# Patient Record
Sex: FEMALE | Race: WHITE | NOT HISPANIC OR LATINO | Employment: FULL TIME | URBAN - NONMETROPOLITAN AREA
[De-identification: names, ages, dates, MRNs, and addresses within clinical notes are randomized per-mention and may not be internally consistent; named-entity substitution may affect disease eponyms.]

---

## 2017-01-09 ENCOUNTER — OFFICE VISIT (OUTPATIENT)
Dept: FAMILY MEDICINE CLINIC | Facility: CLINIC | Age: 22
End: 2017-01-09

## 2017-01-09 VITALS
WEIGHT: 263 LBS | BODY MASS INDEX: 48.1 KG/M2 | HEART RATE: 74 BPM | OXYGEN SATURATION: 97 % | DIASTOLIC BLOOD PRESSURE: 70 MMHG | SYSTOLIC BLOOD PRESSURE: 108 MMHG

## 2017-01-09 DIAGNOSIS — K21.9 GASTROESOPHAGEAL REFLUX DISEASE WITHOUT ESOPHAGITIS: ICD-10-CM

## 2017-01-09 DIAGNOSIS — Z86.69 HX OF MIGRAINES: ICD-10-CM

## 2017-01-09 DIAGNOSIS — F32.89 OTHER DEPRESSION: ICD-10-CM

## 2017-01-09 DIAGNOSIS — IMO0001: ICD-10-CM

## 2017-01-09 PROCEDURE — 99214 OFFICE O/P EST MOD 30 MIN: CPT | Performed by: NURSE PRACTITIONER

## 2017-01-09 RX ORDER — TOPIRAMATE 25 MG/1
25 TABLET ORAL DAILY
Qty: 30 TABLET | Refills: 0 | Status: SHIPPED | OUTPATIENT
Start: 2017-01-09 | End: 2017-02-08

## 2017-01-09 RX ORDER — SERTRALINE HYDROCHLORIDE 25 MG/1
25 TABLET, FILM COATED ORAL DAILY
Qty: 30 TABLET | Refills: 1 | Status: SHIPPED | OUTPATIENT
Start: 2017-01-09 | End: 2017-02-08

## 2017-01-09 NOTE — PROGRESS NOTES
Subjective   Courtney Saleh is a 21 y.o. female.     HPI Comments: Patient is here for follow up on her nausea, bloating GERD and her pregnancy. She states that it was discovered that she had an ectopic pregnancy on 1/16, and she started Methotrexate injections. She states we went to the ED at [facility] on 1/17, because she did not feel well and was having a lot abdominal pain. She states at the ED she started feeling worse after a TV US. She states the US only shower fluid and  her blood work was ok and they thought she was just going to get to go home. She states when she went to get up to go home though, she almost passed out and her blood pressure was very low. She states they then called in the on call OB and they did emergency surgery on her and found her fallopian tube has ruptured. She states they told her the Methotrexate injections had cause the blood to clot and that made the tube rupture. She states she follow up with them on Friday and they told her the surgery sights were fine but her only option for pregnancy now is IVF insemination. She states she is going to wait for a while and then try this option. She states all of her nausea and bloating has resolved and she is taking her Prevacid, which controls the GERD.     Patient is also her for follow up on her depression. She states that she used to take Zoloft in the past for depression, but has not taken anything for a couple years. She states that she feels she needs it again after this ectopic pregnancy. She states she feels sad, a lot, does not want to go anywhere, no motivation, trouble sleeping and feeling tired.     Patient states she also wants to go back on her Topamax for her migraines and she still has her prescription at home that she was taking.        The following portions of the patient's history were reviewed and updated as appropriate: allergies, current medications, past family history, past medical history, past social history, past surgical  history and problem list.    Review of Systems   Constitutional: Negative.    HENT: Negative.    Eyes: Negative.    Respiratory: Negative.    Cardiovascular: Negative.    Gastrointestinal: Negative.    Genitourinary: Negative.    Musculoskeletal: Negative.    Skin: Negative.    Neurological: Negative for dizziness, syncope, weakness and numbness.   Hematological: Negative for adenopathy.   Psychiatric/Behavioral: Positive for dysphoric mood and sleep disturbance. Negative for confusion, self-injury and suicidal ideas. The patient is not nervous/anxious.        Objective   Physical Exam   Constitutional: She is oriented to person, place, and time. She appears well-developed and well-nourished. No distress.   HENT:   Head: Normocephalic.   Right Ear: External ear normal.   Left Ear: External ear normal.   Nose: Nose normal.   Mouth/Throat: No oropharyngeal exudate.   Eyes: Conjunctivae are normal.   Cardiovascular: Normal rate, regular rhythm and normal heart sounds.    No murmur heard.  Pulmonary/Chest: Effort normal and breath sounds normal. No respiratory distress. She has no wheezes. She has no rales. She exhibits tenderness.   Abdominal: Soft. Bowel sounds are normal. She exhibits no distension and no mass. There is no hepatosplenomegaly or splenomegaly. There is no tenderness. There is no rebound and no guarding. No hernia.   Lymphadenopathy:        Right cervical: No superficial cervical, no deep cervical and no posterior cervical adenopathy present.       Left cervical: No superficial cervical, no deep cervical and no posterior cervical adenopathy present.   Neurological: She is alert and oriented to person, place, and time. Coordination and gait normal.   Skin: Skin is warm and dry. No rash noted.   Psychiatric: She has a normal mood and affect. Her behavior is normal. Judgment and thought content normal.   Nursing note and vitals reviewed.      Assessment/Plan   Courtney was seen today for  follow-up.    Diagnoses and all orders for this visit:    Aborted ectopic pregnancy    Gastroesophageal reflux disease without esophagitis    Other depression  -     topiramate (TOPAMAX) 25 MG tablet; Take 1 tablet by mouth Daily for 30 days.  -     sertraline (ZOLOFT) 25 MG tablet; Take 1 tablet by mouth Daily for 30 days.    Hx of migraines      Patient to follow up with GYN as directed, for all of her GYN concerns.    Patient to continue Prevacid as directed for her GERD, since it is working well.    Zoloft started today for her depression.    Patient to resume her Topamax for her migraines.     Patient was encouraged to keep me informed of any acute changes, lack of improvement, or any new concerning symptoms. Patient voiced understanding of all instructions and denied further questions.    Patient to RTC in one month.

## 2017-01-09 NOTE — MR AVS SNAPSHOT
Courtney Delfino   1/9/2017 1:15 PM   Office Visit    Dept Phone:  [phone]   Encounter #:  [id number]    Provider:  RUTHIE Lew   Department:  [facility] PRIMARY CARE                Your Full Care Plan              Today's Medication Changes          These changes are accurate as of: 1/9/17  2:04 PM.  If you have any questions, ask your nurse or doctor.               New Medication(s)Ordered:     sertraline 25 MG tablet   Commonly known as:  ZOLOFT   Take 1 tablet by mouth Daily for 30 days.         Medication(s)that have changed:     topiramate 25 MG tablet   Commonly known as:  TOPAMAX   Take 1 tablet by mouth Daily for 30 days.   What changed:    - how much to take  - how to take this  - when to take this            Where to Get Your Medications      These medications were sent to [facility] [address] [place] [address] - [address] [place] [address] - [phone]  - [phone] [address] [address]     Phone:  [phone]     sertraline 25 MG tablet         You can get these medications from any pharmacy     Bring a paper prescription for each of these medications     topiramate 25 MG tablet                  Your Updated Medication List          This list is accurate as of: 1/9/17  2:04 PM.  Always use your most recent med list.                lansoprazole 15 MG capsule   Commonly known as:  PREVACID   Take 1 capsule by mouth 2 (two) times a day.       sertraline 25 MG tablet   Commonly known as:  ZOLOFT   Take 1 tablet by mouth Daily for 30 days.       topiramate 25 MG tablet   Commonly known as:  TOPAMAX   Take 1 tablet by mouth Daily for 30 days.               You Were Diagnosed With        Codes Comments    Aborted ectopic pregnancy     ICD-10-CM: O00.90  ICD-9-CM: 633.90     Gastroesophageal reflux disease without esophagitis     ICD-10-CM: K21.9  ICD-9-CM: 530.81     Other depression     ICD-10-CM: F32.89     Hx of migraines      ICD-10-CM: Z86.69  ICD-9-CM: V12.49       Instructions     None    Patient Instructions History      Upcoming Appointments     Visit Type Date Time Department    FOLLOW UP 1/9/2017  1:15 PM [facility]      MyChart Signup     Our records indicate that you have an active [employer] account.    You can view your After Visit Summary by going to [employer] and logging in with your [employer] username and password.  If you don't have a [employer] username and password but a parent or guardian has access to your record, the parent or guardian should login with their own [employer] username and password and access your record to view the After Visit Summary.    If you have questions, you can email [email or web address] or call [phone] to talk to our [employer] staff.  Remember, [employer] is NOT to be used for urgent needs.  For medical emergencies, dial 911.               Other Info from Your Visit           Allergies     No Known Allergies      Reason for Visit     Follow-up Pt here for f/u on nausea, bloating and ectopic pregnancy      Vital Signs     Blood Pressure Pulse Weight Oxygen Saturation Body Mass Index Smoking Status    108/70 (BP Location: Left arm, Patient Position: Sitting) 74 263 lb (119 kg) 97% 48.1 kg/m2 Former Smoker      Problems and Diagnoses Noted     Depression    Acid reflux disease    Hx of migraines    Aborted ectopic pregnancy

## 2017-01-25 ENCOUNTER — OFFICE VISIT (OUTPATIENT)
Dept: FAMILY MEDICINE CLINIC | Facility: CLINIC | Age: 22
End: 2017-01-25

## 2017-01-25 VITALS
HEIGHT: 62 IN | WEIGHT: 259 LBS | BODY MASS INDEX: 47.66 KG/M2 | TEMPERATURE: 99 F | DIASTOLIC BLOOD PRESSURE: 78 MMHG | HEART RATE: 72 BPM | OXYGEN SATURATION: 99 % | SYSTOLIC BLOOD PRESSURE: 124 MMHG

## 2017-01-25 DIAGNOSIS — R05.9 COUGH: Primary | ICD-10-CM

## 2017-01-25 DIAGNOSIS — R53.81 MALAISE: ICD-10-CM

## 2017-01-25 DIAGNOSIS — J06.9 ACUTE URI: ICD-10-CM

## 2017-01-25 LAB
EXPIRATION DATE: NORMAL
FLUAV AG NPH QL: NORMAL
FLUBV AG NPH QL: NORMAL
INTERNAL CONTROL: NORMAL
Lab: NORMAL

## 2017-01-25 PROCEDURE — 87804 INFLUENZA ASSAY W/OPTIC: CPT | Performed by: FAMILY MEDICINE

## 2017-01-25 PROCEDURE — 99213 OFFICE O/P EST LOW 20 MIN: CPT | Performed by: FAMILY MEDICINE

## 2017-01-25 RX ORDER — PREDNISONE 20 MG/1
TABLET ORAL
Qty: 6 TABLET | Refills: 0 | Status: SHIPPED | OUTPATIENT
Start: 2017-01-25 | End: 2017-03-09

## 2017-01-25 RX ORDER — DEXTROMETHORPHAN HYDROBROMIDE AND PROMETHAZINE HYDROCHLORIDE 15; 6.25 MG/5ML; MG/5ML
5 SYRUP ORAL 4 TIMES DAILY PRN
Qty: 118 ML | Refills: 0 | Status: SHIPPED | OUTPATIENT
Start: 2017-01-25 | End: 2017-03-09

## 2017-01-25 NOTE — MR AVS SNAPSHOT
Courtney Delfino   1/25/2017 4:30 PM   Office Visit    Dept Phone:  [phone]   Encounter #:  [id number]    Provider:  Elinor Moya MD   Department:  [facility] PRIMARY CARE                Your Full Care Plan              Today's Medication Changes          These changes are accurate as of: 1/25/17  4:54 PM.  If you have any questions, ask your nurse or doctor.               New Medication(s)Ordered:     predniSONE 20 MG tablet   Commonly known as:  DELTASONE   1 po bid x 3 days       promethazine-dextromethorphan 6.25-15 MG/5ML syrup   Commonly known as:  PROMETHAZINE-DM   Take 5 mL by mouth 4 (Four) Times a Day As Needed for cough.            Where to Get Your Medications      These medications were sent to [facility] [address] [place] [address] - [address] [place] [address] - [phone]  - [phone] [address] [address]     Phone:  [phone]     predniSONE 20 MG tablet    promethazine-dextromethorphan 6.25-15 MG/5ML syrup                  Your Updated Medication List          This list is accurate as of: 1/25/17  4:54 PM.  Always use your most recent med list.                lansoprazole 15 MG capsule   Commonly known as:  PREVACID   Take 1 capsule by mouth 2 (two) times a day.       predniSONE 20 MG tablet   Commonly known as:  DELTASONE   1 po bid x 3 days       promethazine-dextromethorphan 6.25-15 MG/5ML syrup   Commonly known as:  PROMETHAZINE-DM   Take 5 mL by mouth 4 (Four) Times a Day As Needed for cough.       sertraline 25 MG tablet   Commonly known as:  ZOLOFT   Take 1 tablet by mouth Daily for 30 days.       topiramate 25 MG tablet   Commonly known as:  TOPAMAX   Take 1 tablet by mouth Daily for 30 days.               You Were Diagnosed With        Codes Comments    Cough    -  Primary ICD-10-CM: R05  ICD-9-CM: 786.2     Malaise     ICD-10-CM: R53.81  ICD-9-CM: 780.79     Acute URI     ICD-10-CM: J06.9  ICD-9-CM: 465.9        "  Instructions     None    Patient Instructions History      Upcoming Appointments     Visit Type Date Time Department    SAME DAY 1/25/2017  4:30 PM [facility]    FOLLOW UP 2/9/2017  3:30 PM [facility]      MyChart Signup     Our records indicate that you have an active [facility] [employer] account.    You can view your After Visit Summary by going to [employer] and logging in with your [employer] username and password.  If you don't have a [employer] username and password but a parent or guardian has access to your record, the parent or guardian should login with their own [employer] username and password and access your record to view the After Visit Summary.    If you have questions, you can email [email or web address] or call [phone] to talk to our [employer] staff.  Remember, [employer] is NOT to be used for urgent needs.  For medical emergencies, dial 911.               Other Info from Your Visit           Your Appointments     Feb 09, 2017  3:30 PM EST   Follow Up with RUTHIE Lew   [facility] PRIMARY [facility] (--)    [address]  [place] [address] [phone]           Arrive 15 minutes prior to appointment.              Allergies     No Known Allergies      Reason for Visit     Cough x 2 days      Vital Signs     Blood Pressure Pulse Temperature Height Weight Oxygen Saturation    124/78 (BP Location: Right arm, Patient Position: Sitting) 72 99 °F (37.2 °C) 62\" (157.5 cm) 259 lb (117 kg) 99%    Body Mass Index Smoking Status                47.37 kg/m2 Former Smoker          Problems and Diagnoses Noted     Cough    -  Primary    Malaise        Acute upper respiratory infection            "

## 2017-01-25 NOTE — PROGRESS NOTES
Subjective   Courtney Saleh is a 21 y.o. female.     Cough   This is a new problem. The current episode started in the past 7 days (2-3 days). The problem has been gradually worsening. The problem occurs every few minutes. The cough is non-productive. Associated symptoms include chest pain (with cough), a fever (subjective), headaches, myalgias, nasal congestion and shortness of breath. Pertinent negatives include no ear pain, eye redness, hemoptysis, rash, rhinorrhea, sore throat or wheezing. The symptoms are aggravated by lying down and cold air. She has tried nothing for the symptoms.      Review of Systems   Constitutional: Positive for fever (subjective).   HENT: Negative for ear pain, rhinorrhea and sore throat.    Eyes: Negative for pain, discharge and redness.   Respiratory: Positive for cough and shortness of breath. Negative for hemoptysis and wheezing.    Cardiovascular: Positive for chest pain (with cough).   Gastrointestinal: Positive for nausea. Negative for abdominal pain, diarrhea and vomiting.   Genitourinary: Negative for dysuria, frequency, hematuria and urgency.   Musculoskeletal: Positive for arthralgias and myalgias.   Skin: Negative for rash.   Neurological: Positive for weakness and headaches. Negative for syncope.   Psychiatric/Behavioral: Positive for sleep disturbance.     Objective    Vitals:    01/25/17 1629   BP: 124/78   Pulse: 72   Temp: 99 °F (37.2 °C)   SpO2: 99%     Body mass index is 47.37 kg/(m^2).  Last 2 weights    01/25/17  1629   Weight: 259 lb (117 kg)     Physical Exam   Constitutional: She is oriented to person, place, and time. She appears well-developed and well-nourished. She is cooperative. She appears ill (mildly). No distress.   Morbidly obese   HENT:   Head: Normocephalic and atraumatic.   Right Ear: Tympanic membrane, external ear and ear canal normal.   Left Ear: Tympanic membrane, external ear and ear canal normal.   Nose: Mucosal edema present. No rhinorrhea.    Mouth/Throat: Uvula is midline, oropharynx is clear and moist and mucous membranes are normal. Mucous membranes are not dry. No posterior oropharyngeal erythema.   Eyes: Conjunctivae and lids are normal.   Neck: Phonation normal.   Cardiovascular: Normal rate and regular rhythm.    Pulmonary/Chest: Effort normal. No respiratory distress. She has decreased breath sounds. She has no wheezes. She has no rhonchi. She has no rales.   Lymphadenopathy:     She has no cervical adenopathy.   Neurological: She is alert and oriented to person, place, and time. Gait normal.   Skin: Skin is warm and dry. No rash noted.   Psychiatric: She has a normal mood and affect. Her behavior is normal.   Nursing note and vitals reviewed.    Recent Results (from the past 24 hour(s))   POC Influenza A / B    Collection Time: 01/25/17  4:58 PM   Result Value Ref Range    Rapid Influenza A Ag neg     Rapid Influenza B Ag neg     Internal Control Passed Passed    Lot Number [id number]     Expiration Date 08/01/2018      Assessment/Plan   Courtney was seen today for cough.    Diagnoses and all orders for this visit:    Cough  -     promethazine-dextromethorphan (PROMETHAZINE-DM) 6.25-15 MG/5ML syrup; Take 5 mL by mouth 4 (Four) Times a Day As Needed for cough.  -     predniSONE (DELTASONE) 20 MG tablet; 1 po bid x 3 days  -     POC Influenza A / B    Malaise  -     POC Influenza A / B    Acute URI  -     promethazine-dextromethorphan (PROMETHAZINE-DM) 6.25-15 MG/5ML syrup; Take 5 mL by mouth 4 (Four) Times a Day As Needed for cough.  -     predniSONE (DELTASONE) 20 MG tablet; 1 po bid x 3 days  -     POC Influenza A / B    Symptomatic tx of URI reviewed.  If minimal improvement in next few days, consider abx tx.  Patient was encouraged to keep me informed of any acute changes, lack of improvement, or any new concerning symptoms.  Patient voiced understanding of all instructions and denied further questions.

## 2017-03-09 ENCOUNTER — OFFICE VISIT (OUTPATIENT)
Dept: FAMILY MEDICINE CLINIC | Facility: CLINIC | Age: 22
End: 2017-03-09

## 2017-03-09 VITALS
HEIGHT: 62 IN | DIASTOLIC BLOOD PRESSURE: 84 MMHG | HEART RATE: 78 BPM | SYSTOLIC BLOOD PRESSURE: 118 MMHG | OXYGEN SATURATION: 98 % | WEIGHT: 262 LBS | BODY MASS INDEX: 48.21 KG/M2

## 2017-03-09 DIAGNOSIS — E78.2 MIXED HYPERLIPIDEMIA: ICD-10-CM

## 2017-03-09 DIAGNOSIS — K21.9 GASTROESOPHAGEAL REFLUX DISEASE WITHOUT ESOPHAGITIS: ICD-10-CM

## 2017-03-09 DIAGNOSIS — E66.01 OBESITY, CLASS III, BMI 40-49.9 (MORBID OBESITY) (HCC): ICD-10-CM

## 2017-03-09 DIAGNOSIS — Z86.69 HX OF MIGRAINES: ICD-10-CM

## 2017-03-09 DIAGNOSIS — R79.89 ABNORMAL SERUM THYROXINE (T4) LEVEL: ICD-10-CM

## 2017-03-09 DIAGNOSIS — F31.9 BIPOLAR 1 DISORDER, DEPRESSED (HCC): ICD-10-CM

## 2017-03-09 PROCEDURE — 99214 OFFICE O/P EST MOD 30 MIN: CPT | Performed by: NURSE PRACTITIONER

## 2017-03-09 RX ORDER — LANSOPRAZOLE 15 MG/1
15 CAPSULE, DELAYED RELEASE ORAL 2 TIMES DAILY
Qty: 60 CAPSULE | Refills: 5 | Status: SHIPPED | OUTPATIENT
Start: 2017-03-09 | End: 2017-12-14 | Stop reason: DRUGHIGH

## 2017-03-09 RX ORDER — TOPIRAMATE 25 MG/1
25 TABLET ORAL 2 TIMES DAILY
COMMUNITY
End: 2017-03-09 | Stop reason: SDUPTHER

## 2017-03-09 RX ORDER — PHENTERMINE HYDROCHLORIDE 37.5 MG/1
37.5 CAPSULE ORAL EVERY MORNING
Qty: 30 CAPSULE | Refills: 0 | Status: SHIPPED | OUTPATIENT
Start: 2017-03-09 | End: 2017-04-08

## 2017-03-09 RX ORDER — TOPIRAMATE 25 MG/1
25 TABLET ORAL 2 TIMES DAILY
Qty: 60 TABLET | Refills: 5 | Status: SHIPPED | OUTPATIENT
Start: 2017-03-09 | End: 2017-04-08

## 2017-03-13 ENCOUNTER — LAB (OUTPATIENT)
Dept: FAMILY MEDICINE CLINIC | Facility: CLINIC | Age: 22
End: 2017-03-13

## 2017-03-13 DIAGNOSIS — Z23 NEED FOR INFLUENZA VACCINATION: ICD-10-CM

## 2017-03-13 PROCEDURE — 90471 IMMUNIZATION ADMIN: CPT | Performed by: NURSE PRACTITIONER

## 2017-03-13 PROCEDURE — 90686 IIV4 VACC NO PRSV 0.5 ML IM: CPT | Performed by: NURSE PRACTITIONER

## 2017-03-14 ENCOUNTER — TELEPHONE (OUTPATIENT)
Dept: FAMILY MEDICINE CLINIC | Facility: CLINIC | Age: 22
End: 2017-03-14

## 2017-03-14 ENCOUNTER — RESULTS ENCOUNTER (OUTPATIENT)
Dept: FAMILY MEDICINE CLINIC | Facility: CLINIC | Age: 22
End: 2017-03-14

## 2017-03-14 DIAGNOSIS — Z86.69 HX OF MIGRAINES: ICD-10-CM

## 2017-03-14 DIAGNOSIS — E66.01 OBESITY, CLASS III, BMI 40-49.9 (MORBID OBESITY) (HCC): ICD-10-CM

## 2017-03-14 DIAGNOSIS — R79.89 ABNORMAL SERUM THYROXINE (T4) LEVEL: ICD-10-CM

## 2017-03-14 DIAGNOSIS — E78.2 MIXED HYPERLIPIDEMIA: ICD-10-CM

## 2017-03-14 DIAGNOSIS — F31.9 BIPOLAR 1 DISORDER, DEPRESSED (HCC): ICD-10-CM

## 2017-03-14 LAB
ALBUMIN SERPL-MCNC: 4.2 G/DL (ref 3.5–5)
ALBUMIN/GLOB SERPL: 1.5 G/DL (ref 1–2)
ALP SERPL-CCNC: 87 U/L (ref 38–126)
ALT SERPL-CCNC: 27 U/L (ref 13–69)
AST SERPL-CCNC: 26 U/L (ref 15–46)
BILIRUB SERPL-MCNC: 0.9 MG/DL (ref 0.2–1.3)
BUN SERPL-MCNC: 13 MG/DL (ref 7–20)
BUN/CREAT SERPL: 16.3 (ref 7.1–23.5)
CALCIUM SERPL-MCNC: 9.5 MG/DL (ref 8.4–10.2)
CHLORIDE SERPL-SCNC: 106 MMOL/L (ref 98–107)
CHOLEST SERPL-MCNC: 203 MG/DL (ref 0–199)
CO2 SERPL-SCNC: 26 MMOL/L (ref 26–30)
CREAT SERPL-MCNC: 0.8 MG/DL (ref 0.6–1.3)
GLOBULIN SER CALC-MCNC: 2.8 GM/DL
GLUCOSE SERPL-MCNC: 87 MG/DL (ref 74–98)
HDLC SERPL-MCNC: 56 MG/DL (ref 40–60)
LDLC SERPL CALC-MCNC: 127 MG/DL (ref 0–99)
POTASSIUM SERPL-SCNC: 4.4 MMOL/L (ref 3.5–5.1)
PROT SERPL-MCNC: 7 G/DL (ref 6.3–8.2)
SODIUM SERPL-SCNC: 140 MMOL/L (ref 137–145)
T3FREE SERPL-MCNC: 3.1 PG/ML (ref 2–4.4)
T4 FREE SERPL-MCNC: 0.88 NG/DL (ref 0.78–2.19)
TRIGL SERPL-MCNC: 100 MG/DL
TSH SERPL DL<=0.005 MIU/L-ACNC: 3.11 MIU/ML (ref 0.47–4.68)
VLDLC SERPL CALC-MCNC: 20 MG/DL

## 2017-03-14 NOTE — TELEPHONE ENCOUNTER
----- Message from Ember Caballero MA sent at 3/14/2017  6:46 PM EDT -----  Pt notified of results    ----- Message -----     From: RUTHIE Lew     Sent: 3/14/2017   5:28 PM       To: Ember Caballero MA    Her labs all look good!!

## 2017-04-10 ENCOUNTER — OFFICE VISIT (OUTPATIENT)
Dept: FAMILY MEDICINE CLINIC | Facility: CLINIC | Age: 22
End: 2017-04-10

## 2017-04-10 VITALS
SYSTOLIC BLOOD PRESSURE: 118 MMHG | OXYGEN SATURATION: 99 % | BODY MASS INDEX: 45.45 KG/M2 | WEIGHT: 247 LBS | HEART RATE: 74 BPM | DIASTOLIC BLOOD PRESSURE: 90 MMHG | HEIGHT: 62 IN

## 2017-04-10 DIAGNOSIS — F31.9 BIPOLAR 1 DISORDER, DEPRESSED (HCC): ICD-10-CM

## 2017-04-10 DIAGNOSIS — E66.01 OBESITY, CLASS III, BMI 40-49.9 (MORBID OBESITY) (HCC): ICD-10-CM

## 2017-04-10 PROCEDURE — 99214 OFFICE O/P EST MOD 30 MIN: CPT | Performed by: NURSE PRACTITIONER

## 2017-04-10 RX ORDER — PHENTERMINE HYDROCHLORIDE 37.5 MG/1
37.5 CAPSULE ORAL EVERY MORNING
COMMUNITY
End: 2017-04-10 | Stop reason: SDUPTHER

## 2017-04-10 RX ORDER — TOPIRAMATE 25 MG/1
25 TABLET ORAL 2 TIMES DAILY
COMMUNITY
End: 2017-06-09

## 2017-04-10 RX ORDER — PHENTERMINE HYDROCHLORIDE 37.5 MG/1
37.5 CAPSULE ORAL EVERY MORNING
Qty: 30 CAPSULE | Refills: 1 | Status: SHIPPED | OUTPATIENT
Start: 2017-04-10 | End: 2017-07-10 | Stop reason: SDUPTHER

## 2017-04-10 NOTE — PROGRESS NOTES
Subjective   Courtney Saleh is a 21 y.o. female.     HPI Comments: Patient is here today for follow up on her obesity. She states she has lost 15 pounds in a month, since she started Adipex. She states it curbs her appetite and also gives her more energy, so she has been going to the gym,every day. She states she is eating some healthier, but not a lot, but she is eating a lot less. She states it also helps her depression because it gives her energy.        The following portions of the patient's history were reviewed and updated as appropriate: allergies, current medications, past family history, past medical history, past social history, past surgical history and problem list.    Review of Systems   Constitutional: Negative.    HENT: Negative.    Eyes: Negative.    Respiratory: Negative.    Cardiovascular: Negative.    Gastrointestinal: Negative.    Genitourinary: Negative.    Musculoskeletal: Negative.    Skin: Negative.    Neurological: Negative for dizziness, syncope, weakness and numbness.   Hematological: Negative for adenopathy.   Psychiatric/Behavioral: Negative for confusion and suicidal ideas. The patient is not nervous/anxious.        Objective   Physical Exam   Constitutional: She is oriented to person, place, and time. She appears well-developed and well-nourished.   HENT:   Head: Normocephalic.   Right Ear: External ear normal.   Left Ear: External ear normal.   Nose: Nose normal.   Eyes: Conjunctivae are normal.   Neck: Normal range of motion. Neck supple.   Cardiovascular: Normal rate, regular rhythm and normal heart sounds.    Pulmonary/Chest: Effort normal and breath sounds normal. No respiratory distress.   Abdominal: Soft. She exhibits no distension. There is no tenderness.   Neurological: She is alert and oriented to person, place, and time.   Skin: Skin is warm and dry. No rash noted.   Psychiatric: She has a normal mood and affect. Her behavior is normal. Judgment and thought content normal.    Nursing note and vitals reviewed.      Assessment/Plan   Courtney was seen today for follow-up.    Diagnoses and all orders for this visit:    Obesity, Class III, BMI 40-49.9 (morbid obesity)    Bipolar 1 disorder, depressed    Patient congratulated on her 15 pound weight loss, and encouraged to continue her life style modifications. Adipex continued at this time, since it working well and she is tolerating it well. It is also helping with her depression, associated with her Bipolar disorder.     Patient was encouraged to keep me informed of any acute changes, lack of improvement, or any new concerning symptoms. Patient voiced understanding of all instructions and denied further questions.    Patient to RTC in 2 months for follow up.

## 2017-06-09 ENCOUNTER — OFFICE VISIT (OUTPATIENT)
Dept: FAMILY MEDICINE CLINIC | Facility: CLINIC | Age: 22
End: 2017-06-09

## 2017-06-09 VITALS
HEIGHT: 62 IN | SYSTOLIC BLOOD PRESSURE: 118 MMHG | DIASTOLIC BLOOD PRESSURE: 84 MMHG | WEIGHT: 244 LBS | HEART RATE: 90 BPM | OXYGEN SATURATION: 97 % | BODY MASS INDEX: 44.9 KG/M2

## 2017-06-09 DIAGNOSIS — E66.01 OBESITY, CLASS III, BMI 40-49.9 (MORBID OBESITY) (HCC): ICD-10-CM

## 2017-06-09 DIAGNOSIS — Z86.69 HX OF MIGRAINES: ICD-10-CM

## 2017-06-09 DIAGNOSIS — F39 MOOD DISORDER (HCC): ICD-10-CM

## 2017-06-09 PROCEDURE — 99214 OFFICE O/P EST MOD 30 MIN: CPT | Performed by: NURSE PRACTITIONER

## 2017-06-09 RX ORDER — OLANZAPINE 10 MG/1
10 TABLET ORAL NIGHTLY
Qty: 30 TABLET | Refills: 1 | Status: SHIPPED | OUTPATIENT
Start: 2017-06-09 | End: 2017-07-10

## 2017-06-09 RX ORDER — AMITRIPTYLINE HYDROCHLORIDE 10 MG/1
10 TABLET, FILM COATED ORAL NIGHTLY
Qty: 30 TABLET | Refills: 2 | Status: SHIPPED | OUTPATIENT
Start: 2017-06-09 | End: 2017-07-10

## 2017-06-09 NOTE — PROGRESS NOTES
Subjective   Courtney Saleh is a 21 y.o. female.     HPI Comments: Patient is here today for follow up on her obesity. She states the Adipex has worked well for her, she has lost 20 pounds, and has not even taken it for the past month, because she could not afford it. She state she has one month left, so she is going to finish it. She states she is also still walking several times a week and eating healthier, but not as good as she was.     Patient is also here for follow up on her migraines. She states the Topamax was working well but she but her mother was on it and caused her to get a kidney stone, so she stopped it. She states she still needs something for migraines though because she has one nearly every day.       The following portions of the patient's history were reviewed and updated as appropriate: allergies, current medications, past family history, past medical history, past social history, past surgical history and problem list.    Review of Systems   Constitutional: Positive for fatigue. Negative for activity change, appetite change, chills, diaphoresis, fever and unexpected weight change.   Eyes: Negative.    Respiratory: Negative.    Cardiovascular: Negative.    Gastrointestinal: Negative.    Genitourinary: Negative.    Musculoskeletal: Negative.    Skin: Negative.    Neurological: Positive for headaches. Negative for dizziness, syncope, weakness and numbness.   Hematological: Negative for adenopathy.   Psychiatric/Behavioral: Negative for confusion, dysphoric mood, self-injury, sleep disturbance and suicidal ideas. The patient is not nervous/anxious.         Patient states she has been very alvarez lately, and feeling really fatigued, and she thinks it is because she is needing medication again for her Bipolar/Mood disorder       Objective   Physical Exam   Constitutional: She is oriented to person, place, and time. She appears well-developed and well-nourished. No distress.   HENT:   Head:  Normocephalic.   Right Ear: External ear normal.   Left Ear: External ear normal.   Nose: Nose normal.   Eyes: Conjunctivae are normal.   Neck: Normal range of motion. Neck supple. No tracheal deviation present. No thyromegaly present.   Cardiovascular: Normal rate, regular rhythm, normal heart sounds and intact distal pulses.    No murmur heard.  Pulmonary/Chest: Effort normal and breath sounds normal. No respiratory distress. She has no wheezes. She has no rales. She exhibits no tenderness.   Abdominal: Soft. Bowel sounds are normal. She exhibits no distension and no mass. There is no hepatosplenomegaly or splenomegaly. There is no tenderness. There is no rebound and no guarding. No hernia.   Musculoskeletal: Normal range of motion. She exhibits no edema or tenderness.   NROM all major joints   Lymphadenopathy:     She has no cervical adenopathy.        Right cervical: No superficial cervical, no deep cervical and no posterior cervical adenopathy present.       Left cervical: No superficial cervical, no deep cervical and no posterior cervical adenopathy present.   Neurological: She is alert and oriented to person, place, and time. Coordination and gait normal.   Skin: Skin is warm and dry. No rash noted.   Psychiatric: She has a normal mood and affect. Her behavior is normal. Judgment and thought content normal.       Assessment/Plan   Courtney was seen today for follow-up.    Diagnoses and all orders for this visit:    Obesity, Class III, BMI 40-49.9 (morbid obesity)    Hx of migraines  -     amitriptyline (ELAVIL) 10 MG tablet; Take 1 tablet by mouth Every Night for 30 days.    Mood disorder  -     OLANZapine (zyPREXA) 10 MG tablet; Take 1 tablet by mouth Every Night for 30 days.      Patient to continue her last month of Adipex, for her weight loss. Nutrition and activity goals reviewed including: mainly water to drink, limit white flour/processed sugar, high protein, high fiber carbs, good breakfast, working  toward 150 mins cardio per week, weight training 2x/week,    Elavil started today, for her migraines, since she no longer wants to take Topamax.  This will help her sleeping and depression as well.    Zyprexa started today for treatment of her mood disorder.     Patient was encouraged to keep me informed of any acute changes, lack of improvement, or any new concerning symptoms.Patient voiced understanding of all instructions and denied further questions.    Patient to RTC in 4 weeks for follow up and sooner if needed.

## 2017-07-10 ENCOUNTER — OFFICE VISIT (OUTPATIENT)
Dept: FAMILY MEDICINE CLINIC | Facility: CLINIC | Age: 22
End: 2017-07-10

## 2017-07-10 VITALS
DIASTOLIC BLOOD PRESSURE: 78 MMHG | WEIGHT: 237 LBS | HEIGHT: 62 IN | BODY MASS INDEX: 43.61 KG/M2 | HEART RATE: 74 BPM | SYSTOLIC BLOOD PRESSURE: 104 MMHG | OXYGEN SATURATION: 98 %

## 2017-07-10 DIAGNOSIS — E28.2 PCOS (POLYCYSTIC OVARIAN SYNDROME): ICD-10-CM

## 2017-07-10 DIAGNOSIS — E66.01 OBESITY, CLASS III, BMI 40-49.9 (MORBID OBESITY) (HCC): ICD-10-CM

## 2017-07-10 DIAGNOSIS — Z86.69 HX OF MIGRAINES: ICD-10-CM

## 2017-07-10 PROCEDURE — 99214 OFFICE O/P EST MOD 30 MIN: CPT | Performed by: NURSE PRACTITIONER

## 2017-07-10 RX ORDER — PHENTERMINE HYDROCHLORIDE 37.5 MG/1
37.5 CAPSULE ORAL EVERY MORNING
Qty: 30 CAPSULE | Refills: 0 | Status: SHIPPED | OUTPATIENT
Start: 2017-07-10 | End: 2017-09-11 | Stop reason: SDUPTHER

## 2017-07-10 RX ORDER — TOPIRAMATE 50 MG/1
50 TABLET, FILM COATED ORAL DAILY
Qty: 30 TABLET | Refills: 2 | Status: SHIPPED | OUTPATIENT
Start: 2017-07-10 | End: 2017-08-09

## 2017-07-10 NOTE — PROGRESS NOTES
Subjective   Courtney Saleh is a 21 y.o. female.     HPI Comments: Patient is here for follow up on her weight loss. Patient states the Adipex is working well and she is tolerating it well. She states she has not changed her diet and exercise up though. Patient states she would like to start taking Metformin again for her PCOS and this will help with her weight loss as well.     Patient is also here for follow up on her Bipolar disorder. She states she was afraid to try Zyprexa because of the weight gain, and the Elavil made her migraines worse. She states she just does not want to take anything for now.     Patient is also here for follow up on her migraines. She states she requested to go off Topamax, because it cause her mother to develop kidney stones, but she wants it back because it is the only thing that works well for her.       The following portions of the patient's history were reviewed and updated as appropriate: allergies, current medications, past family history, past medical history, past social history, past surgical history and problem list.    Review of Systems   Constitutional: Negative.    HENT: Negative for ear pain, postnasal drip, rhinorrhea, sinus pressure and sore throat.    Respiratory: Negative.    Cardiovascular: Negative.    Gastrointestinal: Negative.    Genitourinary: Negative.    Musculoskeletal: Negative.    Skin: Negative.    Neurological: Positive for headaches. Negative for dizziness, syncope, weakness and numbness.   Hematological: Negative for adenopathy.   Psychiatric/Behavioral: Negative for confusion, dysphoric mood, self-injury, sleep disturbance and suicidal ideas. The patient is not nervous/anxious.        Objective   Physical Exam   Constitutional: She is oriented to person, place, and time. She appears well-developed and well-nourished. No distress.   HENT:   Head: Normocephalic.   Right Ear: External ear normal.   Left Ear: External ear normal.   Nose: Nose normal.    Eyes: Conjunctivae are normal.   Neck: Normal range of motion. Neck supple. No tracheal deviation present. No thyromegaly present.   Cardiovascular: Normal rate, regular rhythm and normal heart sounds.    Pulmonary/Chest: Effort normal and breath sounds normal. No respiratory distress. She has no wheezes. She has no rales. She exhibits no tenderness.   Abdominal: Soft. Bowel sounds are normal. She exhibits no distension and no mass. There is no hepatosplenomegaly or splenomegaly. There is no tenderness. There is no rebound and no guarding. No hernia.   Musculoskeletal: Normal range of motion. She exhibits no edema or tenderness.   Lymphadenopathy:     She has no cervical adenopathy.        Right cervical: No superficial cervical, no deep cervical and no posterior cervical adenopathy present.       Left cervical: No superficial cervical, no deep cervical and no posterior cervical adenopathy present.   Neurological: She is alert and oriented to person, place, and time. Coordination and gait normal.   Skin: Skin is warm and dry. No rash noted.   Psychiatric: She has a normal mood and affect. Her behavior is normal. Judgment and thought content normal.       Assessment/Plan   Courtney was seen today for follow-up.    Diagnoses and all orders for this visit:    Obesity, Class III, BMI 40-49.9 (morbid obesity)  -     phentermine 37.5 MG capsule; Take 1 capsule by mouth Every Morning for 30 days.    PCOS (polycystic ovarian syndrome)  -     metFORMIN (GLUCOPHAGE) 500 MG tablet; Take 1 tablet by mouth Daily With Breakfast for 30 days.    Hx of migraines  -     topiramate (TOPAMAX) 50 MG tablet; Take 1 tablet by mouth Daily for 30 days.      Phentermine continued for one more month, for her obesity. Nutrition and activity goals reviewed including: mainly water to drink, limit white flour/processed sugar, high protein, high fiber carbs, good breakfast, working toward 150 mins cardio per week, weight training  2x/week.    Metformin restarted for her PCOS.     Topamax restarted for her migraines.     Patient was encouraged to keep me informed of any acute changes, lack of improvement, or any new concerning symptoms. Patient voiced understanding of all instructions and denied further questions.

## 2017-08-08 ENCOUNTER — OFFICE VISIT (OUTPATIENT)
Dept: FAMILY MEDICINE CLINIC | Facility: CLINIC | Age: 22
End: 2017-08-08

## 2017-08-08 VITALS
HEIGHT: 62 IN | OXYGEN SATURATION: 98 % | WEIGHT: 232 LBS | SYSTOLIC BLOOD PRESSURE: 115 MMHG | BODY MASS INDEX: 42.69 KG/M2 | HEART RATE: 100 BPM | DIASTOLIC BLOOD PRESSURE: 78 MMHG

## 2017-08-08 DIAGNOSIS — F31.9 BIPOLAR 1 DISORDER, DEPRESSED (HCC): ICD-10-CM

## 2017-08-08 DIAGNOSIS — E28.2 PCOS (POLYCYSTIC OVARIAN SYNDROME): ICD-10-CM

## 2017-08-08 DIAGNOSIS — F39 MOOD DISORDER (HCC): ICD-10-CM

## 2017-08-08 DIAGNOSIS — E66.01 OBESITY, CLASS III, BMI 40-49.9 (MORBID OBESITY) (HCC): ICD-10-CM

## 2017-08-08 PROCEDURE — 99214 OFFICE O/P EST MOD 30 MIN: CPT | Performed by: NURSE PRACTITIONER

## 2017-08-08 NOTE — PROGRESS NOTES
Subjective   Courtney Saleh is a 21 y.o. female.     HPI Comments: Patient is here today for follow up on her weight loss and PCOS. She states the Adipex and Metformin has been working very well for her. She states she has lost 5 more pounds in the past month, and she is tolerating the medications very well. She states she has been eating better and exercising more as well.    Patient states she is also here to discuss her Bipolar and mood disorder. She states she has taken several medications in the past, Lamictal, Zoloft, Effexor, and Geodon. She states Zoloft helped for a while, then stopped working, and Geodon helped a lot, but made her gain too much weight. She states she needs something to help her mood but not cause her to gain weight.        The following portions of the patient's history were reviewed and updated as appropriate: allergies, current medications, past family history, past medical history, past social history, past surgical history and problem list.    Review of Systems   Constitutional: Negative.    HENT: Negative.    Eyes: Negative.    Respiratory: Negative.    Cardiovascular: Negative.    Gastrointestinal: Negative.    Genitourinary: Negative.    Musculoskeletal: Negative.    Skin: Negative.    Neurological: Negative for dizziness, syncope, weakness and numbness.   Hematological: Negative for adenopathy.   Psychiatric/Behavioral: Positive for agitation and dysphoric mood. Negative for behavioral problems, confusion, decreased concentration, hallucinations, self-injury, sleep disturbance and suicidal ideas. The patient is nervous/anxious. The patient is not hyperactive.        Objective   Physical Exam   Constitutional: She is oriented to person, place, and time. She appears well-developed and well-nourished. No distress.   HENT:   Head: Normocephalic.   Right Ear: External ear normal.   Left Ear: External ear normal.   Nose: Nose normal.   Eyes: Conjunctivae are normal.   Neck: Normal range  of motion. Neck supple.   Cardiovascular: Normal rate, regular rhythm and normal heart sounds.    No murmur heard.  Pulmonary/Chest: Effort normal and breath sounds normal. No respiratory distress. She has no wheezes. She has no rales.   Abdominal: Soft. Bowel sounds are normal. She exhibits no distension and no mass. There is no hepatosplenomegaly or splenomegaly. There is no tenderness. There is no rebound and no guarding. No hernia.   Musculoskeletal: Normal range of motion. She exhibits no edema or tenderness.   NROM all major joints   Lymphadenopathy:        Right cervical: No superficial cervical, no deep cervical and no posterior cervical adenopathy present.       Left cervical: No superficial cervical, no deep cervical and no posterior cervical adenopathy present.   Neurological: She is alert and oriented to person, place, and time. Coordination and gait normal.   Skin: Skin is warm and dry. No rash noted.   Psychiatric: She has a normal mood and affect. Her behavior is normal. Judgment and thought content normal.       Assessment/Plan   Courtney was seen today for follow-up.    Diagnoses and all orders for this visit:    Obesity, Class III, BMI 40-49.9 (morbid obesity)    PCOS (polycystic ovarian syndrome)    Bipolar 1 disorder, depressed    Mood disorder      Patient congratulated on her weight loss and encouraged to keep up the life style changes. Adipex stopped for now, and will restart in 1-2 months, once she has taken a break from it.    Patient to continue Metformin for her PCOS.     Genetic testing through [facility], performed in the clinic today, to decide what treatment plan is best for her Bipolar and mood disorder. I will contact patient regarding test results and provide instructions regarding any necessary changes in plan of care.    Patient to RTC in 5 weeks, for follow up on the medication that will be started for her next week, based on the [facility] results.

## 2017-08-17 ENCOUNTER — TELEPHONE (OUTPATIENT)
Dept: FAMILY MEDICINE CLINIC | Facility: CLINIC | Age: 22
End: 2017-08-17

## 2017-08-17 DIAGNOSIS — F31.61 BIPOLAR DISORDER, CURRENT EPISODE MIXED, MILD (HCC): Primary | ICD-10-CM

## 2017-08-17 RX ORDER — OXCARBAZEPINE 300 MG/1
300 TABLET, FILM COATED ORAL 2 TIMES DAILY
Qty: 60 TABLET | Refills: 1 | Status: SHIPPED | OUTPATIENT
Start: 2017-08-17 | End: 2017-09-11 | Stop reason: SDUPTHER

## 2017-09-11 ENCOUNTER — OFFICE VISIT (OUTPATIENT)
Dept: FAMILY MEDICINE CLINIC | Facility: CLINIC | Age: 22
End: 2017-09-11

## 2017-09-11 VITALS
WEIGHT: 239 LBS | HEART RATE: 74 BPM | OXYGEN SATURATION: 98 % | BODY MASS INDEX: 43.98 KG/M2 | HEIGHT: 62 IN | DIASTOLIC BLOOD PRESSURE: 78 MMHG | SYSTOLIC BLOOD PRESSURE: 124 MMHG

## 2017-09-11 DIAGNOSIS — E66.01 OBESITY, CLASS III, BMI 40-49.9 (MORBID OBESITY) (HCC): ICD-10-CM

## 2017-09-11 DIAGNOSIS — F39 MOOD DISORDER (HCC): ICD-10-CM

## 2017-09-11 DIAGNOSIS — F31.61 BIPOLAR DISORDER, CURRENT EPISODE MIXED, MILD (HCC): ICD-10-CM

## 2017-09-11 DIAGNOSIS — Z23 NEED FOR INFLUENZA VACCINATION: ICD-10-CM

## 2017-09-11 PROCEDURE — 99214 OFFICE O/P EST MOD 30 MIN: CPT | Performed by: NURSE PRACTITIONER

## 2017-09-11 PROCEDURE — 90471 IMMUNIZATION ADMIN: CPT | Performed by: NURSE PRACTITIONER

## 2017-09-11 PROCEDURE — 90686 IIV4 VACC NO PRSV 0.5 ML IM: CPT | Performed by: NURSE PRACTITIONER

## 2017-09-11 RX ORDER — OXCARBAZEPINE 300 MG/1
300 TABLET, FILM COATED ORAL 2 TIMES DAILY
Qty: 60 TABLET | Refills: 2 | Status: SHIPPED | OUTPATIENT
Start: 2017-09-11 | End: 2017-12-14 | Stop reason: SDUPTHER

## 2017-09-11 RX ORDER — PHENTERMINE HYDROCHLORIDE 37.5 MG/1
37.5 CAPSULE ORAL EVERY MORNING
Qty: 30 CAPSULE | Refills: 0 | Status: SHIPPED | OUTPATIENT
Start: 2017-09-11 | End: 2017-10-13 | Stop reason: CLARIF

## 2017-09-11 RX ORDER — TOPIRAMATE 50 MG/1
TABLET, FILM COATED ORAL
COMMUNITY
Start: 2017-08-22 | End: 2017-12-14 | Stop reason: SDUPTHER

## 2017-10-13 ENCOUNTER — OFFICE VISIT (OUTPATIENT)
Dept: FAMILY MEDICINE CLINIC | Facility: CLINIC | Age: 22
End: 2017-10-13

## 2017-10-13 VITALS
SYSTOLIC BLOOD PRESSURE: 100 MMHG | WEIGHT: 232 LBS | BODY MASS INDEX: 42.69 KG/M2 | DIASTOLIC BLOOD PRESSURE: 78 MMHG | HEIGHT: 62 IN

## 2017-10-13 DIAGNOSIS — E66.01 OBESITY, CLASS III, BMI 40-49.9 (MORBID OBESITY) (HCC): ICD-10-CM

## 2017-10-13 PROCEDURE — 99214 OFFICE O/P EST MOD 30 MIN: CPT | Performed by: NURSE PRACTITIONER

## 2017-10-13 RX ORDER — PHENTERMINE HYDROCHLORIDE 37.5 MG/1
37.5 TABLET ORAL
Qty: 30 TABLET | Refills: 1 | Status: SHIPPED | OUTPATIENT
Start: 2017-10-13 | End: 2017-10-13 | Stop reason: SDUPTHER

## 2017-10-13 RX ORDER — PHENTERMINE HYDROCHLORIDE 37.5 MG/1
37.5 TABLET ORAL
Qty: 30 TABLET | Refills: 1 | Status: SHIPPED | OUTPATIENT
Start: 2017-10-13 | End: 2017-12-14 | Stop reason: SDUPTHER

## 2017-10-13 NOTE — PROGRESS NOTES
Subjective   Courtney Saleh is a 22 y.o. female.     HPI Comments: Patient is here for follow up on her obesity. She states she Adipex is helping curb her appetite and eat less. She states she has also cut out fatty, greasy foods and drinking more water. She states she is also trying to exercise several days a week. She states she is tolerating the medications well and has lost 7 pounds.        The following portions of the patient's history were reviewed and updated as appropriate: allergies, current medications, past family history, past medical history, past social history, past surgical history and problem list.    Review of Systems   Constitutional: Negative.    HENT: Negative.    Eyes: Negative.    Respiratory: Negative.    Cardiovascular: Negative.    Gastrointestinal: Negative.    Endocrine: Negative.    Genitourinary: Negative.    Musculoskeletal: Negative.    Skin: Negative.    Allergic/Immunologic: Negative for immunocompromised state.   Neurological: Negative for dizziness, syncope, weakness, numbness and headaches.   Hematological: Negative for adenopathy.   Psychiatric/Behavioral: Negative for confusion, dysphoric mood, self-injury, sleep disturbance and suicidal ideas. The patient is not nervous/anxious.        Objective   Physical Exam   Constitutional: She is oriented to person, place, and time. She appears well-developed and well-nourished. No distress.   HENT:   Head: Normocephalic.   Right Ear: External ear normal.   Left Ear: External ear normal.   Nose: Nose normal.   Eyes: Conjunctivae are normal.   Neck: Normal range of motion. Neck supple. No tracheal deviation present. No thyromegaly present.   Cardiovascular: Normal rate, regular rhythm and normal heart sounds.    No murmur heard.  Pulmonary/Chest: Effort normal and breath sounds normal. No respiratory distress. She has no wheezes. She has no rales. She exhibits no tenderness.   Abdominal: Soft. Bowel sounds are normal. She exhibits no  distension. There is no hepatosplenomegaly or splenomegaly. There is no tenderness. There is no guarding.   Musculoskeletal: Normal range of motion. She exhibits no edema or tenderness.   NROM all major joints   Lymphadenopathy:     She has no cervical adenopathy.        Right cervical: No superficial cervical, no deep cervical and no posterior cervical adenopathy present.       Left cervical: No superficial cervical, no deep cervical and no posterior cervical adenopathy present.   Neurological: She is alert and oriented to person, place, and time. Coordination and gait normal.   Skin: Skin is warm and dry. No rash noted.   Psychiatric: She has a normal mood and affect. Her behavior is normal. Judgment and thought content normal.       Assessment/Plan   Courtney was seen today for follow-up.    Diagnoses and all orders for this visit:    Obesity, Class III, BMI 40-49.9 (morbid obesity)  -     Discontinue: phentermine (ADIPEX-P) 37.5 MG tablet; Take 1 tablet by mouth Every Morning Before Breakfast for 30 days.  -     phentermine (ADIPEX-P) 37.5 MG tablet; Take 1 tablet by mouth Every Morning Before Breakfast for 30 days.      Adipex continued today, for her treatment of her obesity, since it is working well.     Patient was encouraged to keep me informed of any acute changes, or any new concerning symptoms. Patient voiced understanding of all instructions and denied further questions.    Patient to RTC in 2 months for follow up and prn.

## 2017-11-26 DIAGNOSIS — E28.2 PCOS (POLYCYSTIC OVARIAN SYNDROME): ICD-10-CM

## 2017-12-14 ENCOUNTER — OFFICE VISIT (OUTPATIENT)
Dept: FAMILY MEDICINE CLINIC | Facility: CLINIC | Age: 22
End: 2017-12-14

## 2017-12-14 VITALS
DIASTOLIC BLOOD PRESSURE: 74 MMHG | TEMPERATURE: 100.2 F | BODY MASS INDEX: 43.24 KG/M2 | SYSTOLIC BLOOD PRESSURE: 104 MMHG | HEART RATE: 86 BPM | OXYGEN SATURATION: 98 % | HEIGHT: 62 IN | WEIGHT: 235 LBS

## 2017-12-14 DIAGNOSIS — Z86.69 HX OF MIGRAINES: ICD-10-CM

## 2017-12-14 DIAGNOSIS — F39 MOOD DISORDER (HCC): ICD-10-CM

## 2017-12-14 DIAGNOSIS — F31.61 BIPOLAR DISORDER, CURRENT EPISODE MIXED, MILD (HCC): ICD-10-CM

## 2017-12-14 DIAGNOSIS — E66.01 OBESITY, CLASS III, BMI 40-49.9 (MORBID OBESITY) (HCC): ICD-10-CM

## 2017-12-14 DIAGNOSIS — E78.2 MIXED HYPERLIPIDEMIA: ICD-10-CM

## 2017-12-14 DIAGNOSIS — J01.00 ACUTE NON-RECURRENT MAXILLARY SINUSITIS: ICD-10-CM

## 2017-12-14 DIAGNOSIS — E28.2 PCOS (POLYCYSTIC OVARIAN SYNDROME): ICD-10-CM

## 2017-12-14 DIAGNOSIS — K21.9 GASTROESOPHAGEAL REFLUX DISEASE WITHOUT ESOPHAGITIS: ICD-10-CM

## 2017-12-14 PROCEDURE — 36415 COLL VENOUS BLD VENIPUNCTURE: CPT | Performed by: NURSE PRACTITIONER

## 2017-12-14 PROCEDURE — 99214 OFFICE O/P EST MOD 30 MIN: CPT | Performed by: NURSE PRACTITIONER

## 2017-12-14 RX ORDER — PHENTERMINE HYDROCHLORIDE 37.5 MG/1
37.5 TABLET ORAL
Qty: 30 TABLET | Refills: 1 | Status: SHIPPED | OUTPATIENT
Start: 2017-12-14 | End: 2018-03-22 | Stop reason: SDUPTHER

## 2017-12-14 RX ORDER — LANSOPRAZOLE 15 MG/1
15 CAPSULE, DELAYED RELEASE ORAL DAILY
Qty: 30 CAPSULE | Refills: 2 | Status: SHIPPED | OUTPATIENT
Start: 2017-12-14 | End: 2018-03-22 | Stop reason: SDUPTHER

## 2017-12-14 RX ORDER — FLUTICASONE PROPIONATE 50 MCG
2 SPRAY, SUSPENSION (ML) NASAL DAILY
Qty: 1 BOTTLE | Refills: 2 | Status: SHIPPED | OUTPATIENT
Start: 2017-12-14 | End: 2018-01-13

## 2017-12-14 RX ORDER — OXCARBAZEPINE 300 MG/1
300 TABLET, FILM COATED ORAL 2 TIMES DAILY
Qty: 60 TABLET | Refills: 2 | Status: SHIPPED | OUTPATIENT
Start: 2017-12-14 | End: 2019-03-18

## 2017-12-14 RX ORDER — AMOXICILLIN AND CLAVULANATE POTASSIUM 875; 125 MG/1; MG/1
1 TABLET, FILM COATED ORAL 2 TIMES DAILY
Qty: 20 TABLET | Refills: 0 | Status: SHIPPED | OUTPATIENT
Start: 2017-12-14 | End: 2017-12-24

## 2017-12-14 RX ORDER — TOPIRAMATE 50 MG/1
50 TABLET, FILM COATED ORAL DAILY
Qty: 30 TABLET | Refills: 2 | Status: SHIPPED | OUTPATIENT
Start: 2017-12-14 | End: 2018-03-22 | Stop reason: DRUGHIGH

## 2017-12-14 NOTE — PROGRESS NOTES
Subjective   Courtney Saleh is a 22 y.o. female.     HPI Comments: Patient is here today for follow up on her mood disorder. She states she is doing very well with the Trileptal. She states it helps with the depression and she has no side effects from it.    Patient is also here for follow up on her obesity. She states she was unable to get her last month of Adipex until 2 days ago, because she could not afford it. She states she switched jobs so her check was held up for a while and she couldn't afford it. She states she is also traveling more now and has been eating more fast food and that has not helped either. She states she has still tried to do good though and has only gained 3 pounds. She would like to continue the Adipex a little longer though because it helps tremendously.     Patient is also her for follow up on her migraines and GERD. She states the Topamax and Prevacid are working well for her.    Patient is also here for follow up on her PCOS. Her Metformin is working well.        The following portions of the patient's history were reviewed and updated as appropriate: allergies, current medications, past family history, past medical history, past social history, past surgical history and problem list.    Review of Systems   Constitutional: Negative.    HENT: Positive for postnasal drip, rhinorrhea, sinus pain and sinus pressure. Negative for congestion, ear pain, sneezing and sore throat.    Eyes: Negative.    Respiratory: Negative.    Cardiovascular: Negative.    Gastrointestinal: Negative.    Genitourinary: Negative.    Musculoskeletal: Negative.    Skin: Negative.    Neurological: Positive for headaches. Negative for dizziness, syncope, weakness and numbness.   Hematological: Negative for adenopathy.   Psychiatric/Behavioral: Negative for confusion and suicidal ideas. The patient is not nervous/anxious.        Objective   Physical Exam   Constitutional: She is oriented to person, place, and time. She  appears well-developed and well-nourished. No distress.   HENT:   Head: Normocephalic.   Right Ear: External ear normal.   Left Ear: External ear normal.   Nose: Nose normal.   Mouth/Throat: Oropharyngeal exudate (PND) present.   Eyes: Conjunctivae are normal.   Neck: Normal range of motion. Neck supple. No tracheal deviation present. No thyromegaly present.   Cardiovascular: Normal rate, regular rhythm and normal heart sounds.    No murmur heard.  Pulmonary/Chest: Effort normal and breath sounds normal. No respiratory distress. She has no wheezes. She has no rales. She exhibits no tenderness.   Abdominal: Soft. Bowel sounds are normal. She exhibits no distension and no mass. There is no hepatosplenomegaly or splenomegaly. There is no tenderness. There is no rebound and no guarding. No hernia.   Musculoskeletal: Normal range of motion. She exhibits no edema or tenderness.   NROM   Lymphadenopathy:     She has no cervical adenopathy.   Neurological: She is alert and oriented to person, place, and time. Coordination and gait normal.   Skin: Skin is warm and dry. No rash noted.   Psychiatric: She has a normal mood and affect. Her behavior is normal. Judgment and thought content normal.       Assessment/Plan   Courtney was seen today for follow-up, cough, nasal congestion and depression.    Diagnoses and all orders for this visit:    Mood disorder  -     OXcarbazepine (TRILEPTAL) 300 MG tablet; Take 1 tablet by mouth 2 (Two) Times a Day for 30 days.  -     Comprehensive Metabolic Panel    Bipolar disorder, current episode mixed, mild  -     OXcarbazepine (TRILEPTAL) 300 MG tablet; Take 1 tablet by mouth 2 (Two) Times a Day for 30 days.  -     Comprehensive Metabolic Panel    Obesity, Class III, BMI 40-49.9 (morbid obesity)  -     phentermine (ADIPEX-P) 37.5 MG tablet; Take 1 tablet by mouth Every Morning Before Breakfast for 30 days.  -     Comprehensive Metabolic Panel    PCOS (polycystic ovarian syndrome)  -      metFORMIN (GLUCOPHAGE) 500 MG tablet; Take 1 tablet by mouth Daily With Breakfast for 30 days.  -     Comprehensive Metabolic Panel    Hx of migraines  -     topiramate (TOPAMAX) 50 MG tablet; Take 1 tablet by mouth Daily for 30 days.  -     Comprehensive Metabolic Panel    Gastroesophageal reflux disease without esophagitis  -     lansoprazole (PREVACID 24HR) 15 MG capsule; Take 1 capsule by mouth Daily for 30 days.  -     Comprehensive Metabolic Panel    Acute non-recurrent maxillary sinusitis  -     fluticasone (FLONASE) 50 MCG/ACT nasal spray; 2 sprays into each nostril Daily for 30 days.  -     amoxicillin-clavulanate (AUGMENTIN) 875-125 MG per tablet; Take 1 tablet by mouth 2 (Two) Times a Day for 10 days.      Trileptal continued for her mood and Bipolar disorder.     Adipex continued for treatment of her obesity. Nutrition and activity goals reviewed including: mainly water to drink, limit white flour/processed sugar, high protein, high fiber carbs, good breakfast, working toward 150 mins cardio per week, weight training 2x/week.    Glucophage continued for her PCOS.    Topomax continued for her migraines and Prevacid for her reflux.    Flonase and Augmentin started today for her sinusitis.     Screening labs obtained in the clinic today. I will contact patient regarding test results and provide instructions regarding any necessary changes in plan of care.    Patient was encouraged to keep me informed of any acute changes, lack of improvement, or any new concerning symptoms. Patient voiced understanding of all instructions and denied further questions.    Patient to RTC in 2 months.

## 2017-12-15 ENCOUNTER — TELEPHONE (OUTPATIENT)
Dept: FAMILY MEDICINE CLINIC | Facility: CLINIC | Age: 22
End: 2017-12-15

## 2017-12-15 LAB
ALBUMIN SERPL-MCNC: 4.4 G/DL (ref 3.5–5.5)
ALBUMIN/GLOB SERPL: 1.8 {RATIO} (ref 1.2–2.2)
ALP SERPL-CCNC: 79 IU/L (ref 39–117)
ALT SERPL-CCNC: 16 IU/L (ref 0–32)
AST SERPL-CCNC: 13 IU/L (ref 0–40)
BILIRUB SERPL-MCNC: <0.2 MG/DL (ref 0–1.2)
BUN SERPL-MCNC: 13 MG/DL (ref 6–20)
BUN/CREAT SERPL: 17 (ref 9–23)
CALCIUM SERPL-MCNC: 9.6 MG/DL (ref 8.7–10.2)
CHLORIDE SERPL-SCNC: 100 MMOL/L (ref 96–106)
CO2 SERPL-SCNC: 26 MMOL/L (ref 18–29)
CREAT SERPL-MCNC: 0.78 MG/DL (ref 0.57–1)
GFR SERPLBLD CREATININE-BSD FMLA CKD-EPI: 107 ML/MIN/1.73
GFR SERPLBLD CREATININE-BSD FMLA CKD-EPI: 123 ML/MIN/1.73
GLOBULIN SER CALC-MCNC: 2.5 G/DL (ref 1.5–4.5)
GLUCOSE SERPL-MCNC: 95 MG/DL (ref 65–99)
POTASSIUM SERPL-SCNC: 4.7 MMOL/L (ref 3.5–5.2)
PROT SERPL-MCNC: 6.9 G/DL (ref 6–8.5)
SODIUM SERPL-SCNC: 140 MMOL/L (ref 134–144)

## 2018-03-22 ENCOUNTER — OFFICE VISIT (OUTPATIENT)
Dept: FAMILY MEDICINE CLINIC | Facility: CLINIC | Age: 23
End: 2018-03-22

## 2018-03-22 VITALS
OXYGEN SATURATION: 97 % | WEIGHT: 248 LBS | BODY MASS INDEX: 45.64 KG/M2 | DIASTOLIC BLOOD PRESSURE: 80 MMHG | SYSTOLIC BLOOD PRESSURE: 115 MMHG | HEART RATE: 84 BPM | TEMPERATURE: 97.9 F | HEIGHT: 62 IN

## 2018-03-22 DIAGNOSIS — E28.2 PCOS (POLYCYSTIC OVARIAN SYNDROME): ICD-10-CM

## 2018-03-22 DIAGNOSIS — K21.9 GASTROESOPHAGEAL REFLUX DISEASE WITHOUT ESOPHAGITIS: ICD-10-CM

## 2018-03-22 DIAGNOSIS — Z72.0 TOBACCO ABUSE DISORDER: ICD-10-CM

## 2018-03-22 DIAGNOSIS — Z86.69 HX OF MIGRAINES: ICD-10-CM

## 2018-03-22 DIAGNOSIS — F39 MOOD DISORDER (HCC): ICD-10-CM

## 2018-03-22 DIAGNOSIS — E66.01 OBESITY, CLASS III, BMI 40-49.9 (MORBID OBESITY) (HCC): ICD-10-CM

## 2018-03-22 DIAGNOSIS — E78.5 HYPERLIPIDEMIA LDL GOAL <100: ICD-10-CM

## 2018-03-22 DIAGNOSIS — F31.61 BIPOLAR DISORDER, CURRENT EPISODE MIXED, MILD (HCC): ICD-10-CM

## 2018-03-22 PROCEDURE — 99214 OFFICE O/P EST MOD 30 MIN: CPT | Performed by: NURSE PRACTITIONER

## 2018-03-22 PROCEDURE — 99406 BEHAV CHNG SMOKING 3-10 MIN: CPT | Performed by: NURSE PRACTITIONER

## 2018-03-22 RX ORDER — TOPIRAMATE 25 MG/1
25 TABLET ORAL NIGHTLY
Qty: 30 TABLET | Refills: 2 | Status: SHIPPED | OUTPATIENT
Start: 2018-03-22 | End: 2018-04-21

## 2018-03-22 RX ORDER — PHENTERMINE HYDROCHLORIDE 37.5 MG/1
37.5 TABLET ORAL
Qty: 30 TABLET | Refills: 0 | Status: SHIPPED | OUTPATIENT
Start: 2018-03-22 | End: 2018-05-04 | Stop reason: SDUPTHER

## 2018-03-22 RX ORDER — LANSOPRAZOLE 15 MG/1
15 CAPSULE, DELAYED RELEASE ORAL DAILY
Qty: 30 CAPSULE | Refills: 2 | Status: SHIPPED | OUTPATIENT
Start: 2018-03-22 | End: 2020-09-01 | Stop reason: SDUPTHER

## 2018-03-22 NOTE — PROGRESS NOTES
Subjective   Courtney Saleh is a 22 y.o. female.     Patient is here today for her 3 month follow up for her chronic conditions: Bipolar and mood disorder, GERD, obesity and PCOS.     BIPOLAR/MOOD DISORDER:  Patient states her Bipolar and mood disorder are very well controlled with Trileptal. She denies any side effects.    GERD:  Her GERD is well controlled with Prevacid.    OBESITY:  She states she has gained some weight since her last visit, but she has not taken any Adipex since November. She states she also has stopped eating healthy and being active as well. She has a sit down job all day. She states she did start a low carb diet last week, and her  is helping her do it. She would also like to start back on Adipex.    HYPERLIPIDEMIA:  She has not been following a low fat diet or exercising, for her hyperlipidemia.     PCOS:  Patient states she has not been taking her Metformin for her PCOS. She just decided to stop taking it, but is going to start it back.     Patient states she stopped taking her Topamax, and had been doing fine, but she has recently started to start having migraines again, so she would like to start it back, but at a lower dose.    She is still smoking some. She states she had quit but she has started smoking some again. It is usually less than half a pack per day.         The following portions of the patient's history were reviewed and updated as appropriate: allergies, current medications, past family history, past medical history, past social history, past surgical history and problem list.    Review of Systems   Constitutional: Negative.    Eyes: Negative.    Respiratory: Negative.    Cardiovascular: Negative.    Gastrointestinal: Negative.    Genitourinary: Negative.    Musculoskeletal: Negative.    Skin: Negative.    Allergic/Immunologic: Negative.    Neurological: Positive for headaches. Negative for dizziness, syncope, weakness and numbness.   Hematological: Negative for  "adenopathy.   Psychiatric/Behavioral: Negative for confusion and suicidal ideas. The patient is not nervous/anxious.      Vitals:    03/22/18 1726   BP: 115/80   BP Location: Right arm   Patient Position: Sitting   Pulse: 84   Temp: 97.9 °F (36.6 °C)   SpO2: 97%   Weight: 112 kg (248 lb)   Height: 157.5 cm (62\")     Objective   Physical Exam   Constitutional: She is oriented to person, place, and time. She appears well-developed and well-nourished. No distress.   HENT:   Head: Normocephalic.   Right Ear: External ear normal.   Left Ear: External ear normal.   Nose: Nose normal.   Eyes: Conjunctivae are normal.   Neck: Normal range of motion. Neck supple.   Cardiovascular: Normal rate, regular rhythm and normal heart sounds.    Pulmonary/Chest: Effort normal and breath sounds normal. No respiratory distress. She has no wheezes. She has no rales. She exhibits no tenderness.   Abdominal: Soft. Bowel sounds are normal. She exhibits no distension and no mass. There is no hepatosplenomegaly or splenomegaly. There is no tenderness. There is no rebound and no guarding. No hernia.   Musculoskeletal: Normal range of motion. She exhibits no edema or tenderness.   NROM all major joints   Neurological: She is alert and oriented to person, place, and time. Coordination and gait normal.   Skin: Skin is warm and dry. No rash noted.   Psychiatric: She has a normal mood and affect. Her behavior is normal. Judgment and thought content normal.   Nursing note and vitals reviewed.      Assessment/Plan   Courtney was seen today for follow-up.    Diagnoses and all orders for this visit:    Bipolar disorder, current episode mixed, mild  -     Comprehensive Metabolic Panel; Future    Mood disorder  -     Comprehensive Metabolic Panel; Future    Gastroesophageal reflux disease without esophagitis  -     lansoprazole (PREVACID 24HR) 15 MG capsule; Take 1 capsule by mouth Daily for 30 days.    Obesity, Class III, BMI 40-49.9 (morbid obesity)  -   "   phentermine (ADIPEX-P) 37.5 MG tablet; Take 1 tablet by mouth Every Morning Before Breakfast for 30 days.  -     Comprehensive Metabolic Panel; Future    Hyperlipidemia LDL goal <100  -     Lipid Panel; Future    PCOS (polycystic ovarian syndrome)    Hx of migraines  -     topiramate (TOPAMAX) 25 MG tablet; Take 1 tablet by mouth Every Night for 30 days.    Tobacco abuse disorder    Patient to continue Trileptal as directed.    Continue Prevacid as directed.     Adipex started to assist with her weight loss. Nutrition and activity goals reviewed including: mainly water to drink, limit white flour/processed sugar, high protein, high fiber carbs, good breakfast, working toward 150 mins cardio per week, weight training 2x/week.    Patient to RTC fasting for a lipid panel and CMP. I will contact patient regarding test results and provide instructions regarding any necessary changes in plan of care.    Patient to start taking her Metformin as directed, for her PCOS.    Topamax restarted for treatment of her migraines.    Smoking cessation advised.  Pt voiced understanding of health risks of cont' smoking. 4-5 minutes was spent discussing this.     Patient was encouraged to keep me informed of any acute changes, lack of improvement, or any new concerning symptoms. Patient voiced understanding of all instructions and denied further questions.    Patient to RTC in 4 weeks for follow up on her weight loss.

## 2018-03-26 ENCOUNTER — TELEPHONE (OUTPATIENT)
Dept: FAMILY MEDICINE CLINIC | Facility: CLINIC | Age: 23
End: 2018-03-26

## 2018-03-26 NOTE — TELEPHONE ENCOUNTER
----- Message from RUTHIE Latham sent at 3/22/2018 10:32 PM EDT -----  Can you let her know it is time for her to get her cholesterol rechecked, so I put in an order so she can come in fasting one day and get it.

## 2018-03-27 ENCOUNTER — RESULTS ENCOUNTER (OUTPATIENT)
Dept: FAMILY MEDICINE CLINIC | Facility: CLINIC | Age: 23
End: 2018-03-27

## 2018-03-27 DIAGNOSIS — F31.61 BIPOLAR DISORDER, CURRENT EPISODE MIXED, MILD (HCC): ICD-10-CM

## 2018-03-27 DIAGNOSIS — E78.5 HYPERLIPIDEMIA LDL GOAL <100: ICD-10-CM

## 2018-03-27 DIAGNOSIS — E66.01 OBESITY, CLASS III, BMI 40-49.9 (MORBID OBESITY) (HCC): ICD-10-CM

## 2018-03-27 DIAGNOSIS — F39 MOOD DISORDER (HCC): ICD-10-CM

## 2018-05-04 ENCOUNTER — OFFICE VISIT (OUTPATIENT)
Dept: FAMILY MEDICINE CLINIC | Facility: CLINIC | Age: 23
End: 2018-05-04

## 2018-05-04 VITALS
DIASTOLIC BLOOD PRESSURE: 64 MMHG | OXYGEN SATURATION: 99 % | HEART RATE: 86 BPM | HEIGHT: 62 IN | BODY MASS INDEX: 43.79 KG/M2 | WEIGHT: 238 LBS | TEMPERATURE: 97.8 F | SYSTOLIC BLOOD PRESSURE: 98 MMHG

## 2018-05-04 DIAGNOSIS — Z13.220 LIPID SCREENING: ICD-10-CM

## 2018-05-04 DIAGNOSIS — L30.9 ECZEMA, UNSPECIFIED TYPE: ICD-10-CM

## 2018-05-04 DIAGNOSIS — E78.2 MIXED HYPERLIPIDEMIA: ICD-10-CM

## 2018-05-04 DIAGNOSIS — E66.01 OBESITY, CLASS III, BMI 40-49.9 (MORBID OBESITY) (HCC): ICD-10-CM

## 2018-05-04 PROCEDURE — 99214 OFFICE O/P EST MOD 30 MIN: CPT | Performed by: NURSE PRACTITIONER

## 2018-05-04 RX ORDER — PHENTERMINE HYDROCHLORIDE 37.5 MG/1
37.5 TABLET ORAL
Qty: 30 TABLET | Refills: 1 | Status: SHIPPED | OUTPATIENT
Start: 2018-05-04 | End: 2019-03-18

## 2018-05-04 NOTE — PROGRESS NOTES
Subjective   Courtney Saleh is a 22 y.o. female.     Patient is here today for follow up on her obesity. She states the Adipex is helping curb her appetite, so she is eating a lot less. She states she is eating a lot healthier as well, and exercising several times a day. She has also increased her water intake.  She states she has lost 10 pounds. She is having no side effects from the medication.    Patient has complaints of dry, itchy skin on her inner thighs and abdomen the past couple months. She states she has changed body wash and got some lotion for eczema, and that has helped. She has never had this happen before.    She states she has not eaten since this morning, so she can get her lipids checked today.         The following portions of the patient's history were reviewed and updated as appropriate: allergies, current medications, past family history, past medical history, past social history, past surgical history and problem list.    Review of Systems   Constitutional: Negative.    HENT: Negative.    Eyes: Negative.    Respiratory: Negative.    Cardiovascular: Negative.    Gastrointestinal: Negative.    Genitourinary: Negative.    Musculoskeletal: Negative.    Skin: Negative.         Dry itchy skin on abdomen and inner thighs   Allergic/Immunologic: Negative.    Neurological: Negative for dizziness, syncope, weakness, numbness and headaches.   Hematological: Negative for adenopathy.   Psychiatric/Behavioral: Negative for confusion and suicidal ideas. The patient is not nervous/anxious.      Vitals:    05/04/18 1618   BP: 98/64   Pulse: 86   Temp: 97.8 °F (36.6 °C)   SpO2: 99%     Objective   Physical Exam   Constitutional: She is oriented to person, place, and time. She appears well-developed and well-nourished. No distress.   HENT:   Head: Normocephalic.   Right Ear: External ear normal.   Left Ear: External ear normal.   Nose: Nose normal.   Eyes: Conjunctivae are normal.   Neck: Normal range of motion.  Neck supple.   Cardiovascular: Normal rate, regular rhythm and normal heart sounds.    No murmur heard.  Pulmonary/Chest: Effort normal and breath sounds normal. No respiratory distress. She has no wheezes. She has no rales. She exhibits no tenderness.   Abdominal: Soft. Bowel sounds are normal. She exhibits no distension. There is no hepatosplenomegaly or splenomegaly. There is no tenderness.   Musculoskeletal: Normal range of motion. She exhibits no edema or tenderness.   NROM all major joints   Neurological: She is alert and oriented to person, place, and time. Coordination and gait normal.   Skin: Skin is warm and dry. No rash noted.   Psychiatric: She has a normal mood and affect. Her behavior is normal. Judgment and thought content normal.   Nursing note and vitals reviewed.      Assessment/Plan   Courtney was seen today for follow-up.    Diagnoses and all orders for this visit:    Obesity, Class III, BMI 40-49.9 (morbid obesity)  -     phentermine (ADIPEX-P) 37.5 MG tablet; Take 1 tablet by mouth Every Morning Before Breakfast for 30 days.  -     Comprehensive Metabolic Panel    Eczema, unspecified type    Lipid screening  -     Lipid Panel      Patient to continue Adipex as directed.     She was congratulated on her weight loss and life style changes and advised to continue.    Patient advised to use eczema body wash and lotion, and to start Zyrtec qhs.    Patient has been fasting for almost 7 hours, so screening labs obtained in the clinic today. I will contact patient regarding test results and provide instructions regarding any necessary changes in plan of care.    Patient was encouraged to keep me informed of any acute changes, lack of improvement, or any new concerning symptoms. Patient voiced understanding of all instructions and denied further questions.    Patient to RTC in 2 months for follow up and prn.

## 2018-05-05 LAB
ALBUMIN SERPL-MCNC: 4.5 G/DL (ref 3.5–5.5)
ALBUMIN/GLOB SERPL: 1.9 {RATIO} (ref 1.2–2.2)
ALP SERPL-CCNC: 77 IU/L (ref 39–117)
ALT SERPL-CCNC: 15 IU/L (ref 0–32)
AST SERPL-CCNC: 20 IU/L (ref 0–40)
BILIRUB SERPL-MCNC: 0.2 MG/DL (ref 0–1.2)
BUN SERPL-MCNC: 12 MG/DL (ref 6–20)
BUN/CREAT SERPL: 15 (ref 9–23)
CALCIUM SERPL-MCNC: 9.9 MG/DL (ref 8.7–10.2)
CHLORIDE SERPL-SCNC: 101 MMOL/L (ref 96–106)
CHOLEST SERPL-MCNC: 192 MG/DL (ref 100–199)
CO2 SERPL-SCNC: 24 MMOL/L (ref 18–29)
CREAT SERPL-MCNC: 0.82 MG/DL (ref 0.57–1)
GFR SERPLBLD CREATININE-BSD FMLA CKD-EPI: 100 ML/MIN/1.73
GFR SERPLBLD CREATININE-BSD FMLA CKD-EPI: 116 ML/MIN/1.73
GLOBULIN SER CALC-MCNC: 2.4 G/DL (ref 1.5–4.5)
GLUCOSE SERPL-MCNC: 89 MG/DL (ref 65–99)
HDLC SERPL-MCNC: 47 MG/DL
LDLC SERPL CALC-MCNC: 121 MG/DL (ref 0–99)
Lab: NORMAL
POTASSIUM SERPL-SCNC: 4.7 MMOL/L (ref 3.5–5.2)
PROT SERPL-MCNC: 6.9 G/DL (ref 6–8.5)
SODIUM SERPL-SCNC: 143 MMOL/L (ref 134–144)
TRIGL SERPL-MCNC: 122 MG/DL (ref 0–149)
VLDLC SERPL CALC-MCNC: 24 MG/DL (ref 5–40)

## 2019-03-18 ENCOUNTER — OFFICE VISIT (OUTPATIENT)
Dept: FAMILY MEDICINE CLINIC | Facility: CLINIC | Age: 24
End: 2019-03-18

## 2019-03-18 VITALS
WEIGHT: 267 LBS | TEMPERATURE: 98.1 F | DIASTOLIC BLOOD PRESSURE: 70 MMHG | OXYGEN SATURATION: 98 % | SYSTOLIC BLOOD PRESSURE: 100 MMHG | BODY MASS INDEX: 49.13 KG/M2 | HEIGHT: 62 IN | HEART RATE: 76 BPM

## 2019-03-18 DIAGNOSIS — F31.61 BIPOLAR DISORDER, CURRENT EPISODE MIXED, MILD (HCC): ICD-10-CM

## 2019-03-18 DIAGNOSIS — K21.9 GASTROESOPHAGEAL REFLUX DISEASE WITHOUT ESOPHAGITIS: ICD-10-CM

## 2019-03-18 DIAGNOSIS — Z23 NEED FOR INFLUENZA VACCINATION: Primary | ICD-10-CM

## 2019-03-18 DIAGNOSIS — E28.2 PCOS (POLYCYSTIC OVARIAN SYNDROME): ICD-10-CM

## 2019-03-18 DIAGNOSIS — E78.2 MIXED HYPERLIPIDEMIA: ICD-10-CM

## 2019-03-18 DIAGNOSIS — R53.83 FATIGUE, UNSPECIFIED TYPE: ICD-10-CM

## 2019-03-18 DIAGNOSIS — F39 MOOD DISORDER (HCC): ICD-10-CM

## 2019-03-18 DIAGNOSIS — N92.6 ABNORMAL MENSES: ICD-10-CM

## 2019-03-18 DIAGNOSIS — Z13.1 DIABETES MELLITUS SCREENING: ICD-10-CM

## 2019-03-18 DIAGNOSIS — Z87.891 HISTORY OF SMOKING: ICD-10-CM

## 2019-03-18 DIAGNOSIS — Z83.3 FAMILY HISTORY OF DIABETES MELLITUS IN GRANDMOTHER: ICD-10-CM

## 2019-03-18 DIAGNOSIS — Z13.29 THYROID DISORDER SCREENING: ICD-10-CM

## 2019-03-18 DIAGNOSIS — E66.01 OBESITY, CLASS III, BMI 40-49.9 (MORBID OBESITY) (HCC): ICD-10-CM

## 2019-03-18 LAB
ALBUMIN SERPL-MCNC: 4.3 G/DL (ref 3.5–5)
ALBUMIN/GLOB SERPL: 1.6 G/DL (ref 1–2)
ALP SERPL-CCNC: 79 U/L (ref 38–126)
ALT SERPL-CCNC: 32 U/L (ref 13–69)
AST SERPL-CCNC: 27 U/L (ref 15–46)
BILIRUB SERPL-MCNC: 0.3 MG/DL (ref 0.2–1.3)
BUN SERPL-MCNC: 15 MG/DL (ref 7–20)
BUN/CREAT SERPL: 21.4 (ref 7.1–23.5)
CALCIUM SERPL-MCNC: 10.2 MG/DL (ref 8.4–10.2)
CHLORIDE SERPL-SCNC: 103 MMOL/L (ref 98–107)
CO2 SERPL-SCNC: 28 MMOL/L (ref 26–30)
CREAT SERPL-MCNC: 0.7 MG/DL (ref 0.6–1.3)
ERYTHROCYTE [DISTWIDTH] IN BLOOD BY AUTOMATED COUNT: 13.3 % (ref 11.5–14.5)
GLOBULIN SER CALC-MCNC: 2.7 GM/DL
GLUCOSE SERPL-MCNC: 89 MG/DL (ref 74–98)
HBA1C MFR BLD: 5.4 % (ref 4.8–5.6)
HCT VFR BLD AUTO: 42.6 % (ref 37–47)
HGB BLD-MCNC: 13.9 G/DL (ref 12–16)
MCH RBC QN AUTO: 30.2 PG (ref 27–31)
MCHC RBC AUTO-ENTMCNC: 32.6 G/DL (ref 30–37)
MCV RBC AUTO: 92.4 FL (ref 81–99)
PLATELET # BLD AUTO: 294 10*3/MM3 (ref 130–400)
POTASSIUM SERPL-SCNC: 4.9 MMOL/L (ref 3.5–5.1)
PROT SERPL-MCNC: 7 G/DL (ref 6.3–8.2)
RBC # BLD AUTO: 4.61 10*6/MM3 (ref 4.2–5.4)
SODIUM SERPL-SCNC: 137 MMOL/L (ref 137–145)
T4 FREE SERPL-MCNC: 0.8 NG/DL (ref 0.78–2.19)
TSH SERPL DL<=0.005 MIU/L-ACNC: 1.14 MIU/ML (ref 0.47–4.68)
WBC # BLD AUTO: 6.96 10*3/MM3 (ref 4.8–10.8)

## 2019-03-18 PROCEDURE — 90471 IMMUNIZATION ADMIN: CPT | Performed by: NURSE PRACTITIONER

## 2019-03-18 PROCEDURE — 99214 OFFICE O/P EST MOD 30 MIN: CPT | Performed by: NURSE PRACTITIONER

## 2019-03-18 PROCEDURE — 90686 IIV4 VACC NO PRSV 0.5 ML IM: CPT | Performed by: NURSE PRACTITIONER

## 2019-03-18 RX ORDER — PHENTERMINE HYDROCHLORIDE 37.5 MG/1
37.5 TABLET ORAL
Qty: 30 TABLET | Refills: 0 | Status: SHIPPED | OUTPATIENT
Start: 2019-03-18 | End: 2019-04-15 | Stop reason: SDUPTHER

## 2019-03-18 NOTE — PROGRESS NOTES
Subjective   Courtney Saleh is a 23 y.o. female.     Courtney was seen today for follow-up on her chronic conditions. She needs a physical and labs. She has not been able to come in for a follow up, for almost a year, because she lost her insurance.     Diagnoses and all orders for this visit:    Obesity, Class III, BMI 40-49.9   She is here for follow up on her obesity. She had lost 10 pounds at her last visit, with taking Adipex, but since having to quit it, has gained almost 30 pounds. She does not follow a healthy diet, has not been exercising and eats to large of portions. With her job, she eats a lot of fast food and does not have time to exercise.     Mixed hyperlipidemia  She has not been following a low fat/cholesterol diet, since the summer of 2018.    Gastroesophageal reflux disease without esophagitis  She takes OTC Prilosec, as needed, and it works well for her.     Mood disorder /Bipolar disorder, current episode mixed, mild   She has not been taking the Trileptal, since summer 2018. She feels her Bipolar/ mood disorder, is well controlled without medication at this time.     PCOS (polycystic ovarian syndrome)  She has not been taking Metformin, since last summer, because she could not afford it.     Abnormal menses  She has been having really heavy and abnormal periods. She had a Pap last month and her GYN told her it was due to her weight gain over the past 7-8 months. She would like to have her thyroid checked, to make sure this is not the cause of her weight gain and abnormal periods. She has also been feeling more fatigued as well. She will follow up with GYN if her thyroid is ok and  her menses do not improve when she loses weight.     Diabetes mellitus screening  She would also like to be screened for DM, related to her significant weight gain, in such a short period, and her family history of DM.     History of smoking  She quit smoking 3 months ago and does not plan to start again.           The  following portions of the patient's history were reviewed and updated as appropriate: allergies, current medications, past family history, past medical history, past social history, past surgical history and problem list.    Review of Systems   Constitutional: Positive for fatigue and unexpected weight change (Abnormal weight gain). Negative for activity change, appetite change, chills, diaphoresis and fever.   HENT: Negative.    Eyes: Negative.    Respiratory: Negative.    Cardiovascular: Negative.    Gastrointestinal: Negative.         GERD   Endocrine: Negative.    Genitourinary: Positive for menstrual problem. Negative for decreased urine volume, difficulty urinating, dyspareunia, dysuria, enuresis, flank pain, frequency, genital sores, hematuria, pelvic pain, urgency, vaginal bleeding, vaginal discharge and vaginal pain.   Musculoskeletal: Negative.    Skin: Negative.    Allergic/Immunologic: Negative.    Neurological: Negative for dizziness, syncope, weakness, numbness and headaches.   Hematological: Negative for adenopathy.   Psychiatric/Behavioral: Negative for agitation, behavioral problems, confusion, dysphoric mood, self-injury, sleep disturbance and suicidal ideas. The patient is not nervous/anxious.      Vitals:    03/18/19 1359   BP: 100/70   Pulse: 76   Temp: 98.1 °F (36.7 °C)   SpO2: 98%     Objective   Physical Exam   Constitutional: She is oriented to person, place, and time. She appears well-developed and well-nourished. No distress.   HENT:   Head: Normocephalic.   Right Ear: External ear normal.   Left Ear: External ear normal.   Nose: Nose normal.   Mouth/Throat: Oropharynx is clear and moist. No oropharyngeal exudate.   Eyes: Conjunctivae are normal. Pupils are equal, round, and reactive to light.   Neck: Normal range of motion. Neck supple. No tracheal deviation present. No thyromegaly present.   Cardiovascular: Normal rate, regular rhythm, normal heart sounds and intact distal pulses.   No  murmur heard.  Pulmonary/Chest: Effort normal and breath sounds normal. No respiratory distress. She has no wheezes. She has no rales. She exhibits no tenderness.   Abdominal: Soft. Bowel sounds are normal. She exhibits no distension and no mass. There is no hepatosplenomegaly or splenomegaly. There is no tenderness. There is no rebound and no guarding. No hernia.   Musculoskeletal: Normal range of motion. She exhibits no edema or tenderness.   NROM all major joints   Lymphadenopathy:     She has no cervical adenopathy.        Right cervical: No superficial cervical, no deep cervical and no posterior cervical adenopathy present.       Left cervical: No superficial cervical, no deep cervical and no posterior cervical adenopathy present.   Neurological: She is alert and oriented to person, place, and time. Coordination and gait normal.   Skin: Skin is warm and dry. No rash noted.   Psychiatric: She has a normal mood and affect. Her behavior is normal. Judgment and thought content normal.   Nursing note and vitals reviewed.      Assessment/Plan   Courtney was seen today for follow-up.    Diagnoses and all orders for this visit:    Obesity, Class III, BMI 40-49.9 (morbid obesity) (CMS/HCC)  -     CBC (No Diff)  -     Comprehensive Metabolic Panel  -     phentermine (ADIPEX-P) 37.5 MG tablet; Take 1 tablet by mouth Every Morning Before Breakfast for 30 days.    Mixed hyperlipidemia    Gastroesophageal reflux disease without esophagitis    Mood disorder (CMS/HCC)    Bipolar disorder, current episode mixed, mild (CMS/HCC)    PCOS (polycystic ovarian syndrome)  -     metFORMIN (GLUCOPHAGE) 500 MG tablet; Take 1 tablet by mouth Daily With Breakfast for 30 days.  -     CBC (No Diff)  -     Comprehensive Metabolic Panel    Abnormal menses  -     metFORMIN (GLUCOPHAGE) 500 MG tablet; Take 1 tablet by mouth Daily With Breakfast for 30 days.  -     CBC (No Diff)  -     Comprehensive Metabolic Panel    Fatigue, unspecified type  -      CBC (No Diff)  -     Comprehensive Metabolic Panel  -     TSH  -     T4, Free    Thyroid disorder screening  -     TSH  -     T4, Free    Diabetes mellitus screening  -     Hemoglobin A1c    Family history of diabetes mellitus in grandmother  -     Hemoglobin A1c    History of smoking      Courtney was seen today for follow-up on her chronic conditions. She needs a physical and labs. She has not been able to come in for a follow up, for almost a year, because she lost her insurance.     Diagnoses and all orders for this visit:    Obesity, Class III, BMI 40-49.9   She is here for follow up on her obesity. She had lost 10 pounds at her last visit, with taking Adipex, but since having to quit it, has gained almost 30 pounds. She does not follow a healthy diet, has not been exercising and eats to large of portions. With her job, she eats a lot of fast food and does not have time to exercise. She would like to start Adipex again, because it worked very well for her before. Adipex was prescribed today, for assistance with her weight loss.  Nutrition and activity goals reviewed including: mainly water to drink, limit white flour/processed sugar, high protein, high fiber carbs, good breakfast, working toward 150 mins cardio per week, resistance training 2x/week.    Mixed hyperlipidemia  She has not been following a low fat/cholesterol diet, since the summer of 2018. Patient to RTC fasting, for FLP. I will contact patient regarding test results and provide instructions regarding any necessary changes in plan of care.    Gastroesophageal reflux disease without esophagitis  She takes OTC Prilosec, as needed, and it works well for her.     Mood disorder /Bipolar disorder, current episode mixed, mild   She has not been taking the Trileptal, since summer 2018. She feels her Bipolar/ mood disorder, is well controlled without medication at this time.     PCOS (polycystic ovarian syndrome)  She has not been taking Metformin, since  last summer, because she could not afford it. She would like to take it again. Metformin 500 mg daily prescribed, for treatment of her PCOS.     Abnormal menses  She has been having really heavy and abnormal periods. She had a Pap last month and her GYN told her it was due to her weight gain over the past 7-8 months. She would like to have her thyroid checked, to make sure this is not the cause of her weight gain and abnormal periods. She has also been feeling more fatigued as well. She will follow up with GYN if her thyroid is ok and  her menses do not improve when she loses weight.     Diabetes mellitus screening  She would also like to be screened for DM, related to her significant weight gain, in such a short period, and her family history of DM.    TSH, A1c, CBC and CMP, drawn today, for further assessment of her abnormal menses, fatigue and abnormal weight gain, and screening for thyroid disorder and DM. I will contact patient regarding test results and provide instructions regarding any necessary changes in plan of care.    History of smoking  She quit smoking 3 months ago and does not plan to start again. Patient congratulated on her smoking cessation.    Patient was encouraged to keep me informed of any acute changes, lack of improvement, or any new concerning symptoms. Patient voiced understanding of all instructions and denied further questions.    Patient to RTC in 4 weeks for follow up.

## 2019-04-02 ENCOUNTER — HOSPITAL ENCOUNTER (EMERGENCY)
Facility: HOSPITAL | Age: 24
Discharge: HOME | End: 2019-04-02
Attending: EMERGENCY MEDICINE | Admitting: EMERGENCY MEDICINE

## 2019-04-02 ENCOUNTER — APPOINTMENT (OUTPATIENT)
Dept: GENERAL RADIOLOGY | Facility: HOSPITAL | Age: 24
End: 2019-04-02

## 2019-04-02 VITALS
OXYGEN SATURATION: 97 % | DIASTOLIC BLOOD PRESSURE: 71 MMHG | TEMPERATURE: 97.2 F | HEART RATE: 64 BPM | RESPIRATION RATE: 16 BRPM | SYSTOLIC BLOOD PRESSURE: 106 MMHG | WEIGHT: 260 LBS | BODY MASS INDEX: 49.09 KG/M2 | HEIGHT: 61 IN

## 2019-04-02 DIAGNOSIS — M54.9 BACK PAIN, UNSPECIFIED BACK LOCATION, UNSPECIFIED BACK PAIN LATERALITY, UNSPECIFIED CHRONICITY: Primary | ICD-10-CM

## 2019-04-02 DIAGNOSIS — R10.9 ABDOMINAL PAIN, UNSPECIFIED ABDOMINAL LOCATION: ICD-10-CM

## 2019-04-02 LAB
B-HCG UR QL: NEGATIVE
BILIRUB UR QL STRIP: NEGATIVE
CLARITY UR: CLEAR
COLOR UR: YELLOW
GLUCOSE UR STRIP-MCNC: NEGATIVE MG/DL
HGB UR QL STRIP.AUTO: NEGATIVE
KETONES UR QL STRIP: NEGATIVE
LEUKOCYTE ESTERASE UR QL STRIP.AUTO: NEGATIVE
NITRITE UR QL STRIP: NEGATIVE
PH UR STRIP.AUTO: <=5 [PH] (ref 5–8)
PROT UR QL STRIP: NEGATIVE
SP GR UR STRIP: 1.03 (ref 1–1.03)
UROBILINOGEN UR QL STRIP: NORMAL

## 2019-04-02 PROCEDURE — 93005 ELECTROCARDIOGRAM TRACING: CPT | Performed by: EMERGENCY MEDICINE

## 2019-04-02 PROCEDURE — 81003 URINALYSIS AUTO W/O SCOPE: CPT | Performed by: EMERGENCY MEDICINE

## 2019-04-02 PROCEDURE — 99284 EMERGENCY DEPT VISIT MOD MDM: CPT

## 2019-04-02 PROCEDURE — 74018 RADEX ABDOMEN 1 VIEW: CPT

## 2019-04-02 PROCEDURE — 81025 URINE PREGNANCY TEST: CPT | Performed by: EMERGENCY MEDICINE

## 2019-04-02 PROCEDURE — 25010000002 DICYCLOMINE PER 20 MG: Performed by: EMERGENCY MEDICINE

## 2019-04-02 PROCEDURE — 71046 X-RAY EXAM CHEST 2 VIEWS: CPT

## 2019-04-02 PROCEDURE — 96372 THER/PROPH/DIAG INJ SC/IM: CPT

## 2019-04-02 PROCEDURE — 25010000002 KETOROLAC TROMETHAMINE PER 15 MG: Performed by: EMERGENCY MEDICINE

## 2019-04-02 RX ORDER — DICYCLOMINE HYDROCHLORIDE 10 MG/ML
20 INJECTION INTRAMUSCULAR ONCE
Status: COMPLETED | OUTPATIENT
Start: 2019-04-02 | End: 2019-04-02

## 2019-04-02 RX ORDER — KETOROLAC TROMETHAMINE 30 MG/ML
60 INJECTION, SOLUTION INTRAMUSCULAR; INTRAVENOUS ONCE
Status: COMPLETED | OUTPATIENT
Start: 2019-04-02 | End: 2019-04-02

## 2019-04-02 RX ORDER — POLYETHYLENE GLYCOL 3350 17 G/17G
17 POWDER, FOR SOLUTION ORAL DAILY
Qty: 500 G | Refills: 0 | Status: SHIPPED | OUTPATIENT
Start: 2019-04-02 | End: 2020-05-20

## 2019-04-02 RX ADMIN — DICYCLOMINE HYDROCHLORIDE 20 MG: 20 INJECTION, SOLUTION INTRAMUSCULAR at 05:19

## 2019-04-02 RX ADMIN — KETOROLAC TROMETHAMINE 60 MG: 30 INJECTION, SOLUTION INTRAMUSCULAR; INTRAVENOUS at 05:18

## 2019-04-02 NOTE — ED PROVIDER NOTES
TRIAGE CHIEF COMPLAINT:     Nursing and triage notes reviewed    Chief Complaint   Patient presents with   • Back Pain      HPI: Courtney Saleh is a 23 y.o. female who presents to the emergency department complaining of back discomfort.  Patient states she woke up around 1 this morning, approximately 4 hours prior to arrival with bilateral lower back pressure.  She states pain has slowly migrated to the right mid back.  Patient states the pain feels similar to when she has a gas pain however it is in the back instead of the front.  She denies having nausea or vomiting.  Denies any dysuria or hematuria.  She denies chest pain.  She states when the pain moved upward she felt like she had some palpitations.  She denies pleuritic pain.  Denies any pain or swelling in her lower extremities.    REVIEW OF SYSTEMS: All other systems reviewed and are negative     PAST MEDICAL HISTORY:   Past Medical History:   Diagnosis Date   • Asthma    • Bipolar disorder (CMS/HCC)    • Bronchitis    • Depression    • GERD (gastroesophageal reflux disease)    • Hyperlipidemia    • Migraine    • Ovarian cyst    • Polycystic ovarian syndrome         FAMILY HISTORY:   Family History   Problem Relation Age of Onset   • Arthritis Mother    • Hypertension Mother    • Mental illness Mother    • Obesity Mother    • Hyperlipidemia Father    • Colon cancer Maternal Grandmother    • Diabetes Maternal Grandfather         SOCIAL HISTORY:   Social History     Socioeconomic History   • Marital status: Single     Spouse name: Not on file   • Number of children: Not on file   • Years of education: Not on file   • Highest education level: Not on file   Tobacco Use   • Smoking status: Former Smoker   • Smokeless tobacco: Never Used   Substance and Sexual Activity   • Alcohol use: No   • Drug use: No        SURGICAL HISTORY:   Past Surgical History:   Procedure Laterality Date   • MOUTH SURGERY     • MOUTH SURGERY      2008   • OOPHORECTOMY      2014         CURRENT MEDICATIONS:      Medication List      ASK your doctor about these medications    lansoprazole 15 MG capsule  Commonly known as:  PREVACID 24HR  Take 1 capsule by mouth Daily for 30 days.     metFORMIN 500 MG tablet  Commonly known as:  GLUCOPHAGE  Take 1 tablet by mouth Daily With Breakfast for 30 days.     phentermine 37.5 MG tablet  Commonly known as:  ADIPEX-P  Take 1 tablet by mouth Every Morning Before Breakfast for 30 days.           ALLERGIES: Patient has no known allergies.     PHYSICAL EXAM:   VITAL SIGNS:   Vitals:    04/02/19 0447   BP: 123/83   Pulse: 58   Resp:    Temp:    SpO2: 97%      CONSTITUTIONAL: Awake, oriented, appears non-toxic   HENT: Atraumatic, normocephalic, oral mucosa pink and moist, airway patent.  EYES: Conjunctiva clear   NECK: Trachea midline, non-tender, supple  BACK: There is some tenderness to palpation in the mid right back.  There is no midline tenderness in the thoracic or lumbar spine.  Is no swelling, bruising, redness.  CARDIOVASCULAR: Normal heart rate, Normal rhythm, No murmurs, rubs, gallops   PULMONARY/CHEST: Clear to auscultation, no rhonchi, wheezes, or rales. Symmetrical breath sounds.  ABDOMINAL: Non-distended, soft, non-tender - no rebound or guarding.  NEUROLOGIC: Non-focal, moving all four extremities, no gross sensory or motor deficits.   EXTREMITIES: No clubbing, cyanosis, or edema   SKIN: Warm, Dry, No erythema, No rash     ED COURSE / MEDICAL DECISION MAKING:   Courtney Saleh is a 23 y.o. female who presents to the emergency department for evaluation of back discomfort.  Patient is nondistressed on arrival in the emergency department.  Vital signs are stable on arrival.  Physical exam reveals some mild tenderness in the right mid back but otherwise is unremarkable.  There is no abdominal tenderness and no midline back discomfort.  Patient recently started taking Adipex again which could contribute to her feeling of palpitations.  I did obtain an  EKG on arrival which I interpreted to reveal sinus rhythm with rate of 61 bpm.  There were no acute ST segment or T wave changes.  This was a normal appearing EKG. chest x-ray per my interpretation revealed no acute cardiopulmonary process.  A KUB was also obtained which per my interpretation revealed a large stool burden.  Further conversation with patient reveals that she has very infrequent bowel movements stating often only once a week.  I suspect this is contributing to her discomfort.  Will provide some stool softeners and see if this helps patient's symptoms.  Her pain was treated with resolution of discomfort in the emergency department.  Return precautions were discussed.    DECISION TO DISCHARGE/ADMIT: see ED care timeline     FINAL IMPRESSION:   1 --back pain  2 --abdominal pain  3 --     Electronically signed by: Lanny Eng MD, 4/2/2019 5:10 AM       Lanny Eng MD  04/02/19 0609

## 2019-04-15 ENCOUNTER — OFFICE VISIT (OUTPATIENT)
Dept: FAMILY MEDICINE CLINIC | Facility: CLINIC | Age: 24
End: 2019-04-15

## 2019-04-15 VITALS
TEMPERATURE: 98.7 F | HEART RATE: 80 BPM | DIASTOLIC BLOOD PRESSURE: 75 MMHG | SYSTOLIC BLOOD PRESSURE: 120 MMHG | BODY MASS INDEX: 48.38 KG/M2 | OXYGEN SATURATION: 98 % | HEIGHT: 61 IN | WEIGHT: 256.25 LBS

## 2019-04-15 DIAGNOSIS — E66.01 OBESITY, CLASS III, BMI 40-49.9 (MORBID OBESITY) (HCC): ICD-10-CM

## 2019-04-15 PROCEDURE — 99214 OFFICE O/P EST MOD 30 MIN: CPT | Performed by: NURSE PRACTITIONER

## 2019-04-15 RX ORDER — PHENTERMINE HYDROCHLORIDE 37.5 MG/1
37.5 TABLET ORAL
Qty: 30 TABLET | Refills: 1 | Status: SHIPPED | OUTPATIENT
Start: 2019-04-15 | End: 2019-05-15

## 2019-04-15 NOTE — PROGRESS NOTES
Subjective   Courtney Saleh is a 23 y.o. female.     Patient is here today to f/u on her obesity.  She reports she has been taking Adipex 37.5mg PO daily in the morning and has lost 11 pounds.  She has been tolerating it well with no adverse reactions.  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  She is also eating smaller portions because the medicine has decreased her appetite.  She reports that she drinks a lot of water and limits herself to one Vanilla Coke Zero a day.  She says she has been trying to make healthier decisions in regards to what she eats.             The following portions of the patient's history were reviewed and updated as appropriate: allergies, current medications, past family history, past medical history, past social history, past surgical history and problem list.    Review of Systems   Constitutional: Positive for activity change (Increased activity) and appetite change (decreased since taking Adipex).   HENT: Negative.    Eyes: Negative.    Respiratory: Negative.    Cardiovascular: Negative.    Gastrointestinal: Negative.    Endocrine: Negative.    Genitourinary: Negative.    Musculoskeletal: Negative.    Skin: Negative.    Neurological: Negative.    Hematological: Negative.    Psychiatric/Behavioral: Negative.      Vitals:    04/15/19 1353   BP: 120/75   Pulse: 80   Temp: 98.7 °F (37.1 °C)   SpO2: 98%     Objective   Physical Exam   Constitutional: She is oriented to person, place, and time. She appears well-developed and well-nourished. No distress.   HENT:   Head: Normocephalic.   Right Ear: External ear normal.   Left Ear: External ear normal.   Nose: Nose normal.   Eyes: Conjunctivae are normal.   Neck: Normal range of motion. Neck supple. No thyromegaly present.   Cardiovascular: Normal rate, regular rhythm and normal heart sounds.   No murmur heard.  Pulmonary/Chest: Effort normal and breath sounds normal.  No respiratory distress. She has no wheezes. She has no rales. She exhibits no tenderness.   Abdominal: Soft. Bowel sounds are normal. She exhibits no distension. There is no tenderness. There is no guarding.   Musculoskeletal: Normal range of motion. She exhibits no edema or tenderness.   NROM all major joints   Neurological: She is alert and oriented to person, place, and time. Coordination and gait normal.   Skin: Skin is warm and dry. No rash noted.   Psychiatric: She has a normal mood and affect. Her behavior is normal. Judgment and thought content normal.       Assessment/Plan   Courtney was seen today for follow-up.    Diagnoses and all orders for this visit:    Obesity, Class III, BMI 40-49.9 (morbid obesity) (CMS/[facility])  -     phentermine (ADIPEX-P) 37.5 MG tablet; Take 1 tablet by mouth Every Morning Before Breakfast for 30 days.    Patient was instructed to continue to take the Adipex as prescribed, as she has been, or that she may take one-half a tablet in the morning and one-half a tablet at lunch if she feels like the medication is beginning to wear off before she would like it to be.      Nutrition and activity goals reviewed including: mainly water to drink, limit white flour/processed sugar, high protein, high fiber carbs, good breakfast, working toward 150 mins cardio per week, resistance training 2x/week.    Patient was encouraged to keep me informed of any acute changes, lack of improvement, or any new concerning symptoms.    She is to RTC in 2 months and prn.

## 2019-05-15 ENCOUNTER — TELEPHONE (OUTPATIENT)
Dept: FAMILY MEDICINE CLINIC | Facility: CLINIC | Age: 24
End: 2019-05-15

## 2019-05-15 NOTE — TELEPHONE ENCOUNTER
"Pt called sts she received a significant bill from [employer]. Sts she called and was instructed to call her PCP office to have \"main diagnosis code changed\". Reports it being billed for obesity, and her insurance doesn't cover this code.   "

## 2019-05-16 NOTE — TELEPHONE ENCOUNTER
I spoke with keaton, who contacted the billing department with [employer].  All DX that were listed with the patients lab orders were used and billed to her insurance.  Per the billing department with [employer], the patients insurance states that the labs ordered are not covered by her plan.    I sent  The patient a message via [employer] letting her know what was going on and suggested that she get in contact with her insurance company regarding the matter.

## 2019-05-16 NOTE — TELEPHONE ENCOUNTER
I did not bill under obesity. There is obesity, PCOS, fatigue, and diabetes screening that I billed them under. If they are all billed under obesity, the lab messed it up and did it wrong and they need to resubmit the diagnoses. Please have her check this and fix these. You can reprint the labs and it will show everything I billed for. I would never bill any lab under just obesity.

## 2019-09-25 ENCOUNTER — NURSE TRIAGE (OUTPATIENT)
Dept: CALL CENTER | Facility: HOSPITAL | Age: 24
End: 2019-09-25

## 2019-09-25 ENCOUNTER — HOSPITAL ENCOUNTER (EMERGENCY)
Facility: HOSPITAL | Age: 24
Discharge: HOME | End: 2019-09-25
Attending: EMERGENCY MEDICINE | Admitting: EMERGENCY MEDICINE

## 2019-09-25 VITALS
DIASTOLIC BLOOD PRESSURE: 83 MMHG | TEMPERATURE: 97.7 F | SYSTOLIC BLOOD PRESSURE: 126 MMHG | OXYGEN SATURATION: 97 % | BODY MASS INDEX: 49.28 KG/M2 | WEIGHT: 261 LBS | RESPIRATION RATE: 20 BRPM | HEIGHT: 61 IN | HEART RATE: 69 BPM

## 2019-09-25 DIAGNOSIS — M54.6 ACUTE RIGHT-SIDED THORACIC BACK PAIN: Primary | ICD-10-CM

## 2019-09-25 LAB
ALBUMIN SERPL-MCNC: 4.4 G/DL (ref 3.5–5.2)
ALBUMIN/GLOB SERPL: 1.4 G/DL
ALP SERPL-CCNC: 86 U/L (ref 39–117)
ALT SERPL W P-5'-P-CCNC: 19 U/L (ref 1–33)
ANION GAP SERPL CALCULATED.3IONS-SCNC: 14.2 MMOL/L (ref 5–15)
AST SERPL-CCNC: 18 U/L (ref 1–32)
B-HCG UR QL: NEGATIVE
BASOPHILS # BLD AUTO: 0.03 10*3/MM3 (ref 0–0.2)
BASOPHILS NFR BLD AUTO: 0.4 % (ref 0–1.5)
BILIRUB SERPL-MCNC: <0.2 MG/DL (ref 0.2–1.2)
BILIRUB UR QL STRIP: NEGATIVE
BUN BLD-MCNC: 17 MG/DL (ref 6–20)
BUN/CREAT SERPL: 26.2 (ref 7–25)
CALCIUM SPEC-SCNC: 9.6 MG/DL (ref 8.6–10.5)
CHLORIDE SERPL-SCNC: 101 MMOL/L (ref 98–107)
CLARITY UR: CLEAR
CO2 SERPL-SCNC: 23.8 MMOL/L (ref 22–29)
COLOR UR: YELLOW
CREAT BLD-MCNC: 0.65 MG/DL (ref 0.57–1)
DEPRECATED RDW RBC AUTO: 43.9 FL (ref 37–54)
EOSINOPHIL # BLD AUTO: 0.17 10*3/MM3 (ref 0–0.4)
EOSINOPHIL NFR BLD AUTO: 2.1 % (ref 0.3–6.2)
ERYTHROCYTE [DISTWIDTH] IN BLOOD BY AUTOMATED COUNT: 12.9 % (ref 12.3–15.4)
GFR SERPL CREATININE-BSD FRML MDRD: 111 ML/MIN/1.73
GLOBULIN UR ELPH-MCNC: 3.1 GM/DL
GLUCOSE BLD-MCNC: 102 MG/DL (ref 65–99)
GLUCOSE UR STRIP-MCNC: NEGATIVE MG/DL
HCT VFR BLD AUTO: 44.8 % (ref 34–46.6)
HGB BLD-MCNC: 14.4 G/DL (ref 12–15.9)
HGB UR QL STRIP.AUTO: NEGATIVE
IMM GRANULOCYTES # BLD AUTO: 0.04 10*3/MM3 (ref 0–0.05)
IMM GRANULOCYTES NFR BLD AUTO: 0.5 % (ref 0–0.5)
KETONES UR QL STRIP: NEGATIVE
LEUKOCYTE ESTERASE UR QL STRIP.AUTO: NEGATIVE
LIPASE SERPL-CCNC: 41 U/L (ref 13–60)
LYMPHOCYTES # BLD AUTO: 2.68 10*3/MM3 (ref 0.7–3.1)
LYMPHOCYTES NFR BLD AUTO: 32.6 % (ref 19.6–45.3)
MCH RBC QN AUTO: 29.7 PG (ref 26.6–33)
MCHC RBC AUTO-ENTMCNC: 32.1 G/DL (ref 31.5–35.7)
MCV RBC AUTO: 92.4 FL (ref 79–97)
MONOCYTES # BLD AUTO: 0.67 10*3/MM3 (ref 0.1–0.9)
MONOCYTES NFR BLD AUTO: 8.2 % (ref 5–12)
NEUTROPHILS # BLD AUTO: 4.62 10*3/MM3 (ref 1.7–7)
NEUTROPHILS NFR BLD AUTO: 56.2 % (ref 42.7–76)
NITRITE UR QL STRIP: NEGATIVE
NRBC BLD AUTO-RTO: 0 /100 WBC (ref 0–0.2)
PH UR STRIP.AUTO: 5.5 [PH] (ref 5–8)
PLATELET # BLD AUTO: 323 10*3/MM3 (ref 140–450)
PMV BLD AUTO: 9.4 FL (ref 6–12)
POTASSIUM BLD-SCNC: 4.1 MMOL/L (ref 3.5–5.2)
PROT SERPL-MCNC: 7.5 G/DL (ref 6–8.5)
PROT UR QL STRIP: NEGATIVE
RBC # BLD AUTO: 4.85 10*6/MM3 (ref 3.77–5.28)
SODIUM BLD-SCNC: 139 MMOL/L (ref 136–145)
SP GR UR STRIP: 1.03 (ref 1–1.03)
UROBILINOGEN UR QL STRIP: NORMAL
WBC NRBC COR # BLD: 8.21 10*3/MM3 (ref 3.4–10.8)

## 2019-09-25 PROCEDURE — 85025 COMPLETE CBC W/AUTO DIFF WBC: CPT | Performed by: EMERGENCY MEDICINE

## 2019-09-25 PROCEDURE — 80053 COMPREHEN METABOLIC PANEL: CPT | Performed by: EMERGENCY MEDICINE

## 2019-09-25 PROCEDURE — 96375 TX/PRO/DX INJ NEW DRUG ADDON: CPT

## 2019-09-25 PROCEDURE — 96374 THER/PROPH/DIAG INJ IV PUSH: CPT

## 2019-09-25 PROCEDURE — 81003 URINALYSIS AUTO W/O SCOPE: CPT | Performed by: EMERGENCY MEDICINE

## 2019-09-25 PROCEDURE — 25010000002 KETOROLAC TROMETHAMINE PER 15 MG: Performed by: EMERGENCY MEDICINE

## 2019-09-25 PROCEDURE — 81025 URINE PREGNANCY TEST: CPT | Performed by: EMERGENCY MEDICINE

## 2019-09-25 PROCEDURE — 99283 EMERGENCY DEPT VISIT LOW MDM: CPT

## 2019-09-25 PROCEDURE — 83690 ASSAY OF LIPASE: CPT | Performed by: EMERGENCY MEDICINE

## 2019-09-25 PROCEDURE — 25010000002 ONDANSETRON PER 1 MG: Performed by: EMERGENCY MEDICINE

## 2019-09-25 RX ORDER — ONDANSETRON 4 MG/1
4 TABLET, ORALLY DISINTEGRATING ORAL EVERY 6 HOURS PRN
Qty: 10 TABLET | Refills: 0 | Status: SHIPPED | OUTPATIENT
Start: 2019-09-25 | End: 2020-05-20

## 2019-09-25 RX ORDER — ONDANSETRON 2 MG/ML
4 INJECTION INTRAMUSCULAR; INTRAVENOUS ONCE
Status: COMPLETED | OUTPATIENT
Start: 2019-09-25 | End: 2019-09-25

## 2019-09-25 RX ORDER — KETOROLAC TROMETHAMINE 30 MG/ML
30 INJECTION, SOLUTION INTRAMUSCULAR; INTRAVENOUS ONCE
Status: COMPLETED | OUTPATIENT
Start: 2019-09-25 | End: 2019-09-25

## 2019-09-25 RX ADMIN — KETOROLAC TROMETHAMINE 30 MG: 30 INJECTION, SOLUTION INTRAMUSCULAR at 05:50

## 2019-09-25 RX ADMIN — ONDANSETRON 4 MG: 2 INJECTION INTRAMUSCULAR; INTRAVENOUS at 05:49

## 2019-09-25 NOTE — TELEPHONE ENCOUNTER
"    Reason for Disposition  • Health Information question, no triage required and triager able to answer question    Additional Information  • Negative: [1] Caller is not with the adult (patient) AND [2] reporting urgent symptoms  • Negative: Lab result questions  • Negative: Medication questions  • Negative: Caller cannot be reached by phone  • Negative: Caller has already spoken to PCP or another triager  • Negative: RN needs further essential information from caller in order to complete triage  • Negative: Requesting regular office appointment  • Negative: [1] Caller requesting NON-URGENT health information AND [2] PCP's office is the best resource    Answer Assessment - Initial Assessment Questions  1. REASON FOR CALL or QUESTION: \"What is your reason for calling today?\" or \"How can I best help you?\" or \"What question do you have that I can help answer?\"      She was seen in ER this AM and given toradol at 5 AM and they want to know when she can take a dose of naproxen. They were instructed that this could be given now and then follow the directions on the bottle.    Protocols used: INFORMATION ONLY CALL-ADULT-      "

## 2019-09-25 NOTE — ED PROVIDER NOTES
Subjective   23-year-old female presenting with back pain.  She states her for the last 5 days she has had a mild to moderate right-sided back pain, this is mid back, it does not radiate, there are no alleviating or aggravating factors.  It is described as an achy pain.  It is associated with nausea.  She denies fevers, chills, vomiting, diarrhea, urinary complaints.  Denies ever having pain like this before.  She does work a strenuous job that requires a lot of lifting.  She is adamant that she did not hurt herself while working.  She does have a family history of kidney stones.            Review of Systems   Constitutional: Negative.    HENT: Negative.    Eyes: Negative.    Respiratory: Negative.    Cardiovascular: Negative.    Gastrointestinal: Positive for nausea. Negative for abdominal pain and vomiting.   Genitourinary: Negative.    Musculoskeletal: Positive for back pain.   Skin: Negative.    Neurological: Negative.    Psychiatric/Behavioral: Negative.        Past Medical History:   Diagnosis Date   • Asthma    • Bipolar disorder (CMS/HCC)    • Bronchitis    • Depression    • GERD (gastroesophageal reflux disease)    • Hyperlipidemia    • Migraine    • Ovarian cyst    • Polycystic ovarian syndrome        No Known Allergies    Past Surgical History:   Procedure Laterality Date   • MOUTH SURGERY     • MOUTH SURGERY      2008   • OOPHORECTOMY      2014       Family History   Problem Relation Age of Onset   • Arthritis Mother    • Hypertension Mother    • Mental illness Mother    • Obesity Mother    • Hyperlipidemia Father    • Colon cancer Maternal Grandmother    • Diabetes Maternal Grandfather        Social History     Socioeconomic History   • Marital status: Single     Spouse name: Not on file   • Number of children: Not on file   • Years of education: Not on file   • Highest education level: Not on file   Tobacco Use   • Smoking status: Former Smoker   • Smokeless tobacco: Never Used   Substance and Sexual  Activity   • Alcohol use: No   • Drug use: No           Objective   Physical Exam   Constitutional: She is oriented to person, place, and time. She appears well-developed and well-nourished. No distress.   HENT:   Head: Normocephalic and atraumatic.   Right Ear: External ear normal.   Left Ear: External ear normal.   Nose: Nose normal.   Mouth/Throat: Oropharynx is clear and moist.   Eyes: Conjunctivae and EOM are normal. Pupils are equal, round, and reactive to light.   Neck: Normal range of motion. Neck supple.   Cardiovascular: Normal rate, regular rhythm, normal heart sounds and intact distal pulses.   Pulmonary/Chest: Effort normal and breath sounds normal. No respiratory distress.   Abdominal: Soft. Bowel sounds are normal. She exhibits no distension. There is no rebound and no guarding.   No abdominal tenderness, no CVA tenderness   Musculoskeletal: Normal range of motion. She exhibits no edema or deformity.   Mild right mid thoracic tenderness   Neurological: She is alert and oriented to person, place, and time.   Skin: Skin is warm and dry. No rash noted.   Psychiatric: She has a normal mood and affect. Her behavior is normal.   Nursing note and vitals reviewed.      Procedures           ED Course                  MDM  Number of Diagnoses or Management Options  Acute right-sided thoracic back pain:   Diagnosis management comments: 23-year-old female with back pain. Well-developed, well-nourished obese female in no distress with exam as above.  Based on history and exam a suspect musculoskeletal pain.  Given her complaints though will obtain labs and urinalysis.  Will give symptomatic treatment.  Disposition pending work-up.    DDX: Musculoskeletal pain, strain, kidney stone, UTI    Work-up here is largely unremarkable.  Discharge home with outpatient follow-up.       Amount and/or Complexity of Data Reviewed  Clinical lab tests: reviewed        Final diagnoses:   Acute right-sided thoracic back pain               Naren, Jesu Cullen MD  09/25/19 0662

## 2019-11-25 ENCOUNTER — HOSPITAL ENCOUNTER (EMERGENCY)
Facility: HOSPITAL | Age: 24
Discharge: HOME | End: 2019-11-25
Attending: EMERGENCY MEDICINE | Admitting: EMERGENCY MEDICINE

## 2019-11-25 ENCOUNTER — APPOINTMENT (OUTPATIENT)
Dept: CT IMAGING | Facility: HOSPITAL | Age: 24
End: 2019-11-25

## 2019-11-25 VITALS
WEIGHT: 268.6 LBS | HEART RATE: 81 BPM | OXYGEN SATURATION: 97 % | HEIGHT: 61 IN | RESPIRATION RATE: 18 BRPM | DIASTOLIC BLOOD PRESSURE: 98 MMHG | BODY MASS INDEX: 50.71 KG/M2 | SYSTOLIC BLOOD PRESSURE: 127 MMHG | TEMPERATURE: 98.3 F

## 2019-11-25 DIAGNOSIS — R10.9 RIGHT FLANK PAIN: Primary | ICD-10-CM

## 2019-11-25 DIAGNOSIS — K59.00 CONSTIPATION, UNSPECIFIED CONSTIPATION TYPE: ICD-10-CM

## 2019-11-25 LAB
ALBUMIN SERPL-MCNC: 4.3 G/DL (ref 3.5–5.2)
ALBUMIN/GLOB SERPL: 1.3 G/DL
ALP SERPL-CCNC: 91 U/L (ref 39–117)
ALT SERPL W P-5'-P-CCNC: 17 U/L (ref 1–33)
ANION GAP SERPL CALCULATED.3IONS-SCNC: 13.6 MMOL/L (ref 5–15)
AST SERPL-CCNC: 20 U/L (ref 1–32)
B-HCG UR QL: NEGATIVE
BASOPHILS # BLD AUTO: 0.03 10*3/MM3 (ref 0–0.2)
BASOPHILS NFR BLD AUTO: 0.2 % (ref 0–1.5)
BILIRUB SERPL-MCNC: <0.2 MG/DL (ref 0.2–1.2)
BILIRUB UR QL STRIP: NEGATIVE
BUN BLD-MCNC: 15 MG/DL (ref 6–20)
BUN/CREAT SERPL: 17.6 (ref 7–25)
CALCIUM SPEC-SCNC: 9.7 MG/DL (ref 8.6–10.5)
CHLORIDE SERPL-SCNC: 102 MMOL/L (ref 98–107)
CLARITY UR: ABNORMAL
CO2 SERPL-SCNC: 23.4 MMOL/L (ref 22–29)
COLOR UR: YELLOW
CREAT BLD-MCNC: 0.85 MG/DL (ref 0.57–1)
DEPRECATED RDW RBC AUTO: 43.9 FL (ref 37–54)
EOSINOPHIL # BLD AUTO: 0.17 10*3/MM3 (ref 0–0.4)
EOSINOPHIL NFR BLD AUTO: 1.3 % (ref 0.3–6.2)
ERYTHROCYTE [DISTWIDTH] IN BLOOD BY AUTOMATED COUNT: 13.1 % (ref 12.3–15.4)
GFR SERPL CREATININE-BSD FRML MDRD: 81 ML/MIN/1.73
GLOBULIN UR ELPH-MCNC: 3.4 GM/DL
GLUCOSE BLD-MCNC: 123 MG/DL (ref 65–99)
GLUCOSE UR STRIP-MCNC: NEGATIVE MG/DL
HCT VFR BLD AUTO: 44.4 % (ref 34–46.6)
HGB BLD-MCNC: 14.5 G/DL (ref 12–15.9)
HGB UR QL STRIP.AUTO: NEGATIVE
IMM GRANULOCYTES # BLD AUTO: 0.06 10*3/MM3 (ref 0–0.05)
IMM GRANULOCYTES NFR BLD AUTO: 0.5 % (ref 0–0.5)
KETONES UR QL STRIP: NEGATIVE
LEUKOCYTE ESTERASE UR QL STRIP.AUTO: NEGATIVE
LIPASE SERPL-CCNC: 41 U/L (ref 13–60)
LYMPHOCYTES # BLD AUTO: 3.8 10*3/MM3 (ref 0.7–3.1)
LYMPHOCYTES NFR BLD AUTO: 28.5 % (ref 19.6–45.3)
MCH RBC QN AUTO: 29.9 PG (ref 26.6–33)
MCHC RBC AUTO-ENTMCNC: 32.7 G/DL (ref 31.5–35.7)
MCV RBC AUTO: 91.5 FL (ref 79–97)
MONOCYTES # BLD AUTO: 1.05 10*3/MM3 (ref 0.1–0.9)
MONOCYTES NFR BLD AUTO: 7.9 % (ref 5–12)
NEUTROPHILS # BLD AUTO: 8.22 10*3/MM3 (ref 1.7–7)
NEUTROPHILS NFR BLD AUTO: 61.6 % (ref 42.7–76)
NITRITE UR QL STRIP: NEGATIVE
NRBC BLD AUTO-RTO: 0 /100 WBC (ref 0–0.2)
PH UR STRIP.AUTO: 6.5 [PH] (ref 5–8)
PLATELET # BLD AUTO: 375 10*3/MM3 (ref 140–450)
PMV BLD AUTO: 9.4 FL (ref 6–12)
POTASSIUM BLD-SCNC: 4.3 MMOL/L (ref 3.5–5.2)
PROT SERPL-MCNC: 7.7 G/DL (ref 6–8.5)
PROT UR QL STRIP: NEGATIVE
RBC # BLD AUTO: 4.85 10*6/MM3 (ref 3.77–5.28)
SODIUM BLD-SCNC: 139 MMOL/L (ref 136–145)
SP GR UR STRIP: 1.03 (ref 1–1.03)
UROBILINOGEN UR QL STRIP: ABNORMAL
WBC NRBC COR # BLD: 13.33 10*3/MM3 (ref 3.4–10.8)

## 2019-11-25 PROCEDURE — 74176 CT ABD & PELVIS W/O CONTRAST: CPT

## 2019-11-25 PROCEDURE — 81025 URINE PREGNANCY TEST: CPT | Performed by: EMERGENCY MEDICINE

## 2019-11-25 PROCEDURE — 96375 TX/PRO/DX INJ NEW DRUG ADDON: CPT

## 2019-11-25 PROCEDURE — 81003 URINALYSIS AUTO W/O SCOPE: CPT | Performed by: EMERGENCY MEDICINE

## 2019-11-25 PROCEDURE — 25010000002 DIPHENHYDRAMINE PER 50 MG: Performed by: EMERGENCY MEDICINE

## 2019-11-25 PROCEDURE — 99283 EMERGENCY DEPT VISIT LOW MDM: CPT

## 2019-11-25 PROCEDURE — 80053 COMPREHEN METABOLIC PANEL: CPT | Performed by: EMERGENCY MEDICINE

## 2019-11-25 PROCEDURE — 25010000002 KETOROLAC TROMETHAMINE PER 15 MG: Performed by: EMERGENCY MEDICINE

## 2019-11-25 PROCEDURE — 96374 THER/PROPH/DIAG INJ IV PUSH: CPT

## 2019-11-25 PROCEDURE — 25010000002 MORPHINE PER 10 MG: Performed by: EMERGENCY MEDICINE

## 2019-11-25 PROCEDURE — 85025 COMPLETE CBC W/AUTO DIFF WBC: CPT | Performed by: EMERGENCY MEDICINE

## 2019-11-25 PROCEDURE — 25010000002 PROCHLORPERAZINE 10 MG/2ML SOLUTION: Performed by: EMERGENCY MEDICINE

## 2019-11-25 PROCEDURE — 83690 ASSAY OF LIPASE: CPT | Performed by: EMERGENCY MEDICINE

## 2019-11-25 PROCEDURE — 25010000002 ONDANSETRON PER 1 MG: Performed by: EMERGENCY MEDICINE

## 2019-11-25 RX ORDER — ONDANSETRON 2 MG/ML
4 INJECTION INTRAMUSCULAR; INTRAVENOUS ONCE
Status: COMPLETED | OUTPATIENT
Start: 2019-11-25 | End: 2019-11-25

## 2019-11-25 RX ORDER — KETOROLAC TROMETHAMINE 30 MG/ML
30 INJECTION, SOLUTION INTRAMUSCULAR; INTRAVENOUS ONCE
Status: COMPLETED | OUTPATIENT
Start: 2019-11-25 | End: 2019-11-25

## 2019-11-25 RX ORDER — DIPHENHYDRAMINE HYDROCHLORIDE 50 MG/ML
25 INJECTION INTRAMUSCULAR; INTRAVENOUS ONCE
Status: COMPLETED | OUTPATIENT
Start: 2019-11-25 | End: 2019-11-25

## 2019-11-25 RX ORDER — POLYETHYLENE GLYCOL 3350 17 G/17G
17 POWDER, FOR SOLUTION ORAL DAILY PRN
Qty: 500 G | Refills: 0 | Status: SHIPPED | OUTPATIENT
Start: 2019-11-25 | End: 2020-05-20

## 2019-11-25 RX ORDER — SODIUM CHLORIDE 0.9 % (FLUSH) 0.9 %
10 SYRINGE (ML) INJECTION AS NEEDED
Status: DISCONTINUED | OUTPATIENT
Start: 2019-11-25 | End: 2019-11-25 | Stop reason: HOSPADM

## 2019-11-25 RX ORDER — MORPHINE SULFATE 4 MG/ML
4 INJECTION, SOLUTION INTRAMUSCULAR; INTRAVENOUS ONCE
Status: COMPLETED | OUTPATIENT
Start: 2019-11-25 | End: 2019-11-25

## 2019-11-25 RX ORDER — CYCLOBENZAPRINE HCL 10 MG
10 TABLET ORAL 3 TIMES DAILY PRN
Qty: 10 TABLET | Refills: 0 | Status: SHIPPED | OUTPATIENT
Start: 2019-11-25 | End: 2020-05-20

## 2019-11-25 RX ORDER — PROCHLORPERAZINE EDISYLATE 5 MG/ML
10 INJECTION INTRAMUSCULAR; INTRAVENOUS ONCE
Status: COMPLETED | OUTPATIENT
Start: 2019-11-25 | End: 2019-11-25

## 2019-11-25 RX ADMIN — DIPHENHYDRAMINE HYDROCHLORIDE 25 MG: 50 INJECTION INTRAMUSCULAR; INTRAVENOUS at 05:23

## 2019-11-25 RX ADMIN — ONDANSETRON 4 MG: 2 INJECTION INTRAMUSCULAR; INTRAVENOUS at 04:14

## 2019-11-25 RX ADMIN — SODIUM CHLORIDE 1000 ML: 9 INJECTION, SOLUTION INTRAVENOUS at 04:19

## 2019-11-25 RX ADMIN — KETOROLAC TROMETHAMINE 30 MG: 30 INJECTION, SOLUTION INTRAMUSCULAR at 05:21

## 2019-11-25 RX ADMIN — MORPHINE SULFATE 4 MG: 4 INJECTION INTRAVENOUS at 04:14

## 2019-11-25 RX ADMIN — PROCHLORPERAZINE EDISYLATE 10 MG: 5 INJECTION INTRAMUSCULAR; INTRAVENOUS at 05:22

## 2019-11-25 NOTE — ED PROVIDER NOTES
Subjective   24-year-old female presenting with flank pain.  She states that about 4 hours prior to arrival she started having right flank pain.  This is a severe pain, it radiates down her back, there are no alleviating or aggravating factors.  She took 2 naproxen which did not help the pain.  She did start having some nausea and vomiting.  She denies any fevers, chills, diarrhea, urinary complaints.  She was seen a couple months ago for similar complaints though did not have nausea and vomiting at that time.            Review of Systems   Constitutional: Negative.    HENT: Negative.    Eyes: Negative.    Respiratory: Negative.    Cardiovascular: Negative.    Gastrointestinal: Positive for nausea and vomiting. Negative for abdominal pain and diarrhea.   Genitourinary: Positive for flank pain. Negative for dysuria.   Skin: Negative.    Neurological: Negative.    Psychiatric/Behavioral: Negative.        Past Medical History:   Diagnosis Date   • Asthma    • Bipolar disorder (CMS/HCC)    • Bronchitis    • Depression    • GERD (gastroesophageal reflux disease)    • Hyperlipidemia    • Migraine    • Ovarian cyst    • Polycystic ovarian syndrome        No Known Allergies    Past Surgical History:   Procedure Laterality Date   • MOUTH SURGERY     • MOUTH SURGERY      2008   • OOPHORECTOMY      2014       Family History   Problem Relation Age of Onset   • Arthritis Mother    • Hypertension Mother    • Mental illness Mother    • Obesity Mother    • Hyperlipidemia Father    • Colon cancer Maternal Grandmother    • Diabetes Maternal Grandfather        Social History     Socioeconomic History   • Marital status: Single     Spouse name: Not on file   • Number of children: Not on file   • Years of education: Not on file   • Highest education level: Not on file   Tobacco Use   • Smoking status: Current Every Day Smoker     Packs/day: 0.50     Types: Cigarettes   • Smokeless tobacco: Never Used   Substance and Sexual Activity   •  Alcohol use: Yes     Comment: rarely   • Drug use: No           Objective   Physical Exam   Constitutional: She is oriented to person, place, and time. She appears well-developed and well-nourished. No distress.   HENT:   Head: Normocephalic and atraumatic.   Right Ear: External ear normal.   Left Ear: External ear normal.   Nose: Nose normal.   Mouth/Throat: Oropharynx is clear and moist.   Eyes: Conjunctivae and EOM are normal. Pupils are equal, round, and reactive to light.   Neck: Normal range of motion. Neck supple.   Cardiovascular: Normal rate, regular rhythm, normal heart sounds and intact distal pulses.   Pulmonary/Chest: Effort normal and breath sounds normal. No respiratory distress.   Abdominal: Soft. Bowel sounds are normal. She exhibits no distension. There is no tenderness. There is no rebound and no guarding.   No abdominal tenderness, no CVA tenderness   Musculoskeletal: Normal range of motion. She exhibits no edema, tenderness or deformity.   Neurological: She is alert and oriented to person, place, and time.   Skin: Skin is warm and dry. No rash noted.   Psychiatric: She has a normal mood and affect. Her behavior is normal.   Nursing note and vitals reviewed.      Procedures           ED Course                  MDM  Number of Diagnoses or Management Options  Constipation, unspecified constipation type:   Right flank pain:   Diagnosis management comments: 24-year-old female with flank pain.  Well-developed, well-nourished obese young female no distress with exam as above.  Will check labs, urinalysis and CT scan.  We will give symptomatic treatment.  Disposition pending work-up.    DDX: Stone, UTI, musculoskeletal pain    She did start having a migraine headache while here, treated symptomatically.  She feels improved.  Lab work notable for mild leukocytosis, otherwise unremarkable.  CT scan reveals findings consistent with mild fecal impaction in the proximal colon.  We will start her on some  MiraLAX and have her follow-up with her primary doctor.  She is comfortable with an understanding of the plan.       Amount and/or Complexity of Data Reviewed  Clinical lab tests: reviewed  Tests in the radiology section of CPT®: reviewed  Decide to obtain previous medical records or to obtain history from someone other than the patient: yes        Final diagnoses:   Right flank pain   Constipation, unspecified constipation type              Jesu Sneed MD  11/25/19 0672

## 2019-11-29 ENCOUNTER — NURSE TRIAGE (OUTPATIENT)
Dept: CALL CENTER | Facility: HOSPITAL | Age: 24
End: 2019-11-29

## 2019-11-29 NOTE — TELEPHONE ENCOUNTER
"Reviewed guideline with caller advises she be evaluated in ED for severe flank pain worse with laying. Caller agrees to follow care advice.     Reason for Disposition  • [1] SEVERE pain (e.g., excruciating, scale 8-10) AND [2] present > 1 hour    Additional Information  • Negative: Passed out (i.e., lost consciousness, collapsed and was not responding)  • Negative: Shock suspected (e.g., cold/pale/clammy skin, too weak to stand, low BP, rapid pulse)  • Negative: Difficult to awaken or acting confused (e.g., disoriented, slurred speech)  • Negative: Sounds like a life-threatening emergency to the triager  • Negative: Followed a major injury to the back (e.g., MVA, fall > 10 feet or 3 meters, penetrating injury, etc.)  • Negative: Back pain or flank pain during pregnancy  • Negative: Upper, mid or lower back pain that occurs mainly in the midline    Answer Assessment - Initial Assessment Questions  1. LOCATION: \"Where does it hurt?\" (e.g., left, right)      Right flank pain   2. ONSET: \"When did the pain start?\"      Monday relieved after being seen in ED, now it is back  3. SEVERITY: \"How bad is the pain?\" (e.g., Scale 1-10; mild, moderate, or severe)    - MILD (1-3): doesn't interfere with normal activities     - MODERATE (4-7): interferes with normal activities or awakens from sleep     - SEVERE (8-10): excruciating pain and patient unable to do normal activities (stays in bed)        6/10 worse with sitting or laying   4. PATTERN: \"Does the pain come and go, or is it constant?\"       Constant   5. CAUSE: \"What do you think is causing the pain?\"      constipation  6. OTHER SYMPTOMS:  \"Do you have any other symptoms?\" (e.g., fever, abdominal pain, vomiting, leg weakness, burning with urination, blood in urine)      nauseated  7. PREGNANCY:  \"Is there any chance you are pregnant?\" \"When was your last menstrual period?\"      no    Protocols used: FLANK PAIN-ADULT-AH      "

## 2020-05-20 ENCOUNTER — OFFICE VISIT (OUTPATIENT)
Dept: FAMILY MEDICINE CLINIC | Facility: CLINIC | Age: 25
End: 2020-05-20

## 2020-05-20 VITALS
DIASTOLIC BLOOD PRESSURE: 83 MMHG | OXYGEN SATURATION: 96 % | HEIGHT: 61 IN | BODY MASS INDEX: 54.02 KG/M2 | HEART RATE: 86 BPM | WEIGHT: 286.13 LBS | SYSTOLIC BLOOD PRESSURE: 130 MMHG

## 2020-05-20 DIAGNOSIS — E66.01 OBESITY, CLASS III, BMI 40-49.9 (MORBID OBESITY) (HCC): Primary | ICD-10-CM

## 2020-05-20 DIAGNOSIS — E66.01 MORBID OBESITY (HCC): ICD-10-CM

## 2020-05-20 PROCEDURE — 99213 OFFICE O/P EST LOW 20 MIN: CPT | Performed by: NURSE PRACTITIONER

## 2020-05-21 ENCOUNTER — PATIENT MESSAGE (OUTPATIENT)
Dept: FAMILY MEDICINE CLINIC | Facility: CLINIC | Age: 25
End: 2020-05-21

## 2020-05-22 DIAGNOSIS — E66.01 MORBID OBESITY (HCC): ICD-10-CM

## 2020-05-22 DIAGNOSIS — E66.01 OBESITY, CLASS III, BMI 40-49.9 (MORBID OBESITY) (HCC): Primary | ICD-10-CM

## 2020-05-22 NOTE — TELEPHONE ENCOUNTER
From: Courtney Saleh  To: Glo Benavides APRN  Sent: 5/21/2020 1:38 PM EDT  Subject: Visit Follow-Up Question    Angelito Cody,   I was in office yesterday in regards to my weight. Originally I thought that my insurance did not cover weight loss surgery or anything regardless of the reason. But, I reached out to my insurance company today. They explained to me that weight loss surgery is an option if medically necessary. They said I would need a prior authorization from my primary doctor. They gave me a phone number that they requested my primary doctor call to start the prior authorization process.   It is [facility] [phone].   Please message me back & let me know if there's anything I need to do. I've never had to do a PA, so I'm unsure of this process. So please feel free to reach out to me via [employer] or by phone [phone] for further instructions.   Thank you!

## 2020-06-19 ENCOUNTER — OFFICE VISIT (OUTPATIENT)
Dept: FAMILY MEDICINE CLINIC | Facility: CLINIC | Age: 25
End: 2020-06-19

## 2020-06-19 VITALS
OXYGEN SATURATION: 98 % | HEIGHT: 61 IN | TEMPERATURE: 98.6 F | WEIGHT: 286 LBS | DIASTOLIC BLOOD PRESSURE: 75 MMHG | BODY MASS INDEX: 54 KG/M2 | HEART RATE: 77 BPM | SYSTOLIC BLOOD PRESSURE: 110 MMHG

## 2020-06-19 DIAGNOSIS — Z72.0 TOBACCO ABUSE: ICD-10-CM

## 2020-06-19 DIAGNOSIS — E66.01 OBESITY, MORBID, BMI 50 OR HIGHER (HCC): Primary | ICD-10-CM

## 2020-06-19 DIAGNOSIS — R63.2 BINGE EATING: ICD-10-CM

## 2020-06-19 PROCEDURE — 99213 OFFICE O/P EST LOW 20 MIN: CPT | Performed by: NURSE PRACTITIONER

## 2020-06-19 RX ORDER — BUPROPION HYDROCHLORIDE 150 MG/1
TABLET ORAL
Qty: 30 TABLET | Refills: 1 | Status: SHIPPED | OUTPATIENT
Start: 2020-06-19 | End: 2020-07-17

## 2020-06-19 NOTE — PROGRESS NOTES
"      Subjective     Chief Complaint:    Chief Complaint   Patient presents with   • Follow-up     General Follow up       History of Present Illness:   Here to f/u on weight gain. Has referral pending to Dr. Crenshaw. Has been watching her carb intake. Not too strick but is watching. Does drink 1 pop per day. Has cut back on sweets. Has been swimming. Has been trying to go on more walks.   Still smoking. Would like something to help her quit.     Review of Systems   Gen- No fevers, chills  CV- No chest pain, palpitations  Resp- No cough, dyspnea  GI- No N/V/D, abd pain    I have reviewed and/or updated the patient's past medical, surgical, family, social history and problem list as appropriate.     Medications:    Current Outpatient Medications:   •  lansoprazole (PREVACID 24HR) 15 MG capsule, Take 1 capsule by mouth Daily for 30 days., Disp: 30 capsule, Rfl: 2  •  buPROPion XL (Wellbutrin XL) 150 MG 24 hr tablet, 1 po qd, Disp: 30 tablet, Rfl: 1    Allergies:  No Known Allergies    Objective     Vital Signs:   Vitals:    06/19/20 1549   BP: 110/75   Pulse: 77   Temp: 98.6 °F (37 °C)   SpO2: 98%   Weight: 130 kg (286 lb)   Height: 154.9 cm (60.98\")       Physical Exam:    Physical Exam   Constitutional: She is oriented to person, place, and time. She is morbidly obese.  HENT:   Head: Normocephalic and atraumatic.   Eyes: Pupils are equal, round, and reactive to light.   Neck: Neck supple.   Cardiovascular: Normal rate, regular rhythm, normal heart sounds and intact distal pulses.   Pulmonary/Chest: Effort normal and breath sounds normal.   Abdominal: Soft. Bowel sounds are normal. She exhibits no distension. There is no tenderness.   Musculoskeletal: She exhibits no edema.   Neurological: She is alert and oriented to person, place, and time.   Skin: Skin is warm and dry.   Psychiatric: She has a normal mood and affect. Her behavior is normal.   Nursing note and vitals reviewed.      Assessment / Plan "     Assessment/Plan:   Problem List Items Addressed This Visit        Digestive    Obesity, morbid, BMI 50 or higher (CMS/[facility]) - Primary      Other Visit Diagnoses     Tobacco abuse        Relevant Medications    buPROPion XL (Wellbutrin XL) 150 MG 24 hr tablet    Binge eating        Relevant Medications    buPROPion XL (Wellbutrin XL) 150 MG 24 hr tablet        -- pending referral to bariatric to discussed gastric sleeve  -- discussed diet and exercise  -- trial wellbutrin to help with tobacco cessation as well as binge eating    Follow up:  Return in about 4 weeks (around 7/17/2020). for weight check    Electronically signed by RUTHIE Camacho   06/19/2020 3:59 PM      Please note that portions of this note may have been completed with a voice recognition program. Efforts were made to edit the dictations, but occasionally words are mistranscribed.

## 2020-07-17 ENCOUNTER — OFFICE VISIT (OUTPATIENT)
Dept: FAMILY MEDICINE CLINIC | Facility: CLINIC | Age: 25
End: 2020-07-17

## 2020-07-17 VITALS
OXYGEN SATURATION: 98 % | HEART RATE: 72 BPM | TEMPERATURE: 98.2 F | SYSTOLIC BLOOD PRESSURE: 115 MMHG | HEIGHT: 61 IN | DIASTOLIC BLOOD PRESSURE: 70 MMHG | BODY MASS INDEX: 53.62 KG/M2 | WEIGHT: 284 LBS

## 2020-07-17 DIAGNOSIS — N89.8 VAGINAL DISCHARGE: ICD-10-CM

## 2020-07-17 DIAGNOSIS — E66.01 OBESITY, MORBID, BMI 50 OR HIGHER (HCC): Primary | ICD-10-CM

## 2020-07-17 PROCEDURE — 99213 OFFICE O/P EST LOW 20 MIN: CPT | Performed by: NURSE PRACTITIONER

## 2020-07-17 NOTE — PROGRESS NOTES
"      Subjective     Chief Complaint:    Chief Complaint   Patient presents with   • Follow-up     4 week follow up for obesity and medication changes. Patient states that she has stopped the wellbutrin.       History of Present Illness:   Here for weight check. Has been struggling to loose weight. Has lost 2 lbs in the past month.   She stopped wellbutrin because it make her more irritable and snapping at her family members.   Has been struggling this month with diet. Has back off pop and has been drinking more water. Notes [facility] said they could not help her insurance issues. She would like referral to  Bariatric     LMP was may 3rd. Normally irregular. Will go a few months without.   Reports brown discharge every time she uses the bathroom.     Review of Systems   Gen- No fevers, chills  CV- No chest pain, palpitations  Resp- No cough, dyspnea  GI- No N/V/D, abd pain  Michael-No dizziness, headaches    I have reviewed and/or updated the patient's past medical, surgical, family, social history and problem list as appropriate.     Medications:    Current Outpatient Medications:   •  lansoprazole (PREVACID 24HR) 15 MG capsule, Take 1 capsule by mouth Daily for 30 days., Disp: 30 capsule, Rfl: 2    Allergies:  No Known Allergies    Objective     Vital Signs:   Vitals:    07/17/20 1618   BP: 115/70   Pulse: 72   Temp: 98.2 °F (36.8 °C)   SpO2: 98%   Weight: 129 kg (284 lb)   Height: 154.9 cm (60.98\")       Physical Exam:    Physical Exam   Constitutional: She is oriented to person, place, and time. She appears well-developed and well-nourished.   HENT:   Head: Normocephalic and atraumatic.   Eyes: Pupils are equal, round, and reactive to light.   Neck: Neck supple.   Cardiovascular: Regular rhythm, normal heart sounds and intact distal pulses.   Pulmonary/Chest: Effort normal.   Musculoskeletal: She exhibits no edema.   Neurological: She is alert and oriented to person, place, and time.   Skin: Skin is warm " and dry.   Psychiatric: She has a normal mood and affect. Her behavior is normal.   Nursing note and vitals reviewed.      Assessment / Plan     Assessment/Plan:   Problem List Items Addressed This Visit        Digestive    Obesity, morbid, BMI 50 or higher (CMS/[facility]) - Primary    Relevant Orders    Ambulatory Referral to Bariatric Surgery      Other Visit Diagnoses     Vaginal discharge        Relevant Orders    NuSwab VG+ - Swab, Vagina        -- new referral to [facility]  -- discussed diet and exercise  -- nuswab     Follow up:  1 month weight check    Electronically signed by RUTHIE Camacho   07/17/2020 4:47 PM      Please note that portions of this note may have been completed with a voice recognition program. Efforts were made to edit the dictations, but occasionally words are mistranscribed.

## 2020-07-21 ENCOUNTER — TELEPHONE (OUTPATIENT)
Dept: FAMILY MEDICINE CLINIC | Facility: CLINIC | Age: 25
End: 2020-07-21

## 2020-07-21 NOTE — TELEPHONE ENCOUNTER
Patient requested a call back. Patient stated she saw CORNELL Benavides on 7/17/20 and had a vaginal swab. Patient would like to know the results from this as soon as possible.    Please call and advise. Patient call back [phone]

## 2020-07-23 ENCOUNTER — TELEPHONE (OUTPATIENT)
Dept: FAMILY MEDICINE CLINIC | Facility: CLINIC | Age: 25
End: 2020-07-23

## 2020-08-12 ENCOUNTER — OFFICE VISIT (OUTPATIENT)
Dept: FAMILY MEDICINE CLINIC | Facility: CLINIC | Age: 25
End: 2020-08-12

## 2020-08-12 VITALS
BODY MASS INDEX: 54.23 KG/M2 | TEMPERATURE: 98.4 F | OXYGEN SATURATION: 98 % | WEIGHT: 287.25 LBS | DIASTOLIC BLOOD PRESSURE: 70 MMHG | SYSTOLIC BLOOD PRESSURE: 115 MMHG | HEART RATE: 65 BPM | HEIGHT: 61 IN

## 2020-08-12 DIAGNOSIS — M54.6 ACUTE BILATERAL THORACIC BACK PAIN: ICD-10-CM

## 2020-08-12 DIAGNOSIS — E66.01 OBESITY, MORBID, BMI 50 OR HIGHER (HCC): Primary | ICD-10-CM

## 2020-08-12 PROCEDURE — 99213 OFFICE O/P EST LOW 20 MIN: CPT | Performed by: NURSE PRACTITIONER

## 2020-08-12 RX ORDER — CYCLOBENZAPRINE HCL 10 MG
10 TABLET ORAL NIGHTLY PRN
Qty: 30 TABLET | Refills: 1 | Status: SHIPPED | OUTPATIENT
Start: 2020-08-12 | End: 2022-08-17

## 2020-08-12 NOTE — PROGRESS NOTES
"Answers for HPI/ROS submitted by the patient on 8/5/2020   What is the primary reason for your visit?: Other  Please describe your symptoms.: Follow up  Have you had these symptoms before?: No  How long have you been having these symptoms?: 1-4 days        Subjective     Chief Complaint:    Chief Complaint   Patient presents with   • Obesity     4 week follow up for obesity   • Back Pain     Complaints of on going issues with upper back pain. patient states that this has been on going for about year now. patient was seen in the ER last year for this same issue.       History of Present Illness:   Here for weight check. Notes she lost some weight then gained it back. Has upcoming bariatric surgery in the next 6 weeks  Has occ back pain. Notes worsening pain and spasm at night. She does spend most of day at the computer. No numbness or tingling. No urinary or fecal incontinence. Has responded well to flexeril in the past.     Review of Systems  Gen- No fevers, chills  CV- No chest pain, palpitations  Resp- No cough, dyspnea  GI- No N/V/D, abd pain  Neuro-No dizziness, headaches      I have reviewed and/or updated the patient's past medical, surgical, family, social history and problem list as appropriate.     Medications:    Current Outpatient Medications:   •  cyclobenzaprine (FLEXERIL) 10 MG tablet, Take 1 tablet by mouth At Night As Needed for Muscle Spasms., Disp: 30 tablet, Rfl: 1  •  lansoprazole (PREVACID 24HR) 15 MG capsule, Take 1 capsule by mouth Daily for 30 days., Disp: 30 capsule, Rfl: 2    Allergies:  No Known Allergies    Objective     Vital Signs:   Vitals:    08/12/20 1614   BP: 115/70   Pulse: 65   Temp: 98.4 °F (36.9 °C)   SpO2: 98%   Weight: 130 kg (287 lb 4 oz)   Height: 154.9 cm (60.98\")       Physical Exam:    Physical Exam   Constitutional: She is oriented to person, place, and time. She appears well-developed and well-nourished.   HENT:   Head: Normocephalic and atraumatic.   Eyes: Pupils are " equal, round, and reactive to light.   Neck: Neck supple.   Cardiovascular: Normal rate, regular rhythm, normal heart sounds and intact distal pulses.   Pulmonary/Chest: Effort normal and breath sounds normal.   Abdominal: Soft. Bowel sounds are normal. She exhibits no distension. There is no tenderness.   Musculoskeletal: She exhibits no edema.   Some thoracic paraspinal muscle tenderness and spams   Neurological: She is alert and oriented to person, place, and time.   Skin: Skin is warm and dry.   Psychiatric: She has a normal mood and affect. Her behavior is normal.   Nursing note and vitals reviewed.      Assessment / Plan     Assessment/Plan:   Problem List Items Addressed This Visit        Digestive    Obesity, morbid, BMI 50 or higher (CMS/[facility]) - Primary      Other Visit Diagnoses     Acute bilateral thoracic back pain        Relevant Medications    cyclobenzaprine (FLEXERIL) 10 MG tablet        -- continue follow with bariatric surgery  -- continue diet and exercise  -- flexeril for back pain, likely due to poor body mechanics with obesity, no red flags that warrant imagining     Electronically signed by RUTHIE Camacho   08/12/2020 4:40 PM      Please note that portions of this note may have been completed with a voice recognition program. Efforts were made to edit the dictations, but occasionally words are mistranscribed.

## 2020-09-01 DIAGNOSIS — K21.9 GASTROESOPHAGEAL REFLUX DISEASE WITHOUT ESOPHAGITIS: ICD-10-CM

## 2020-09-01 RX ORDER — LANSOPRAZOLE 15 MG/1
15 CAPSULE, DELAYED RELEASE ORAL DAILY
Qty: 30 CAPSULE | Refills: 2 | Status: SHIPPED | OUTPATIENT
Start: 2020-09-01 | End: 2020-10-01

## 2021-05-18 ENCOUNTER — HOSPITAL ENCOUNTER (EMERGENCY)
Facility: HOSPITAL | Age: 26
Discharge: HOME | End: 2021-05-18
Attending: EMERGENCY MEDICINE | Admitting: EMERGENCY MEDICINE

## 2021-05-18 VITALS
RESPIRATION RATE: 18 BRPM | WEIGHT: 200.4 LBS | OXYGEN SATURATION: 97 % | TEMPERATURE: 97.7 F | SYSTOLIC BLOOD PRESSURE: 133 MMHG | HEART RATE: 81 BPM | HEIGHT: 61 IN | DIASTOLIC BLOOD PRESSURE: 94 MMHG | BODY MASS INDEX: 37.84 KG/M2

## 2021-05-18 DIAGNOSIS — M54.9 BACK PAIN, UNSPECIFIED BACK LOCATION, UNSPECIFIED BACK PAIN LATERALITY, UNSPECIFIED CHRONICITY: Primary | ICD-10-CM

## 2021-05-18 LAB
ALBUMIN SERPL-MCNC: 4.7 G/DL (ref 3.5–5.2)
ALBUMIN/GLOB SERPL: 1.7 G/DL
ALP SERPL-CCNC: 80 U/L (ref 39–117)
ALT SERPL W P-5'-P-CCNC: 16 U/L (ref 1–33)
ANION GAP SERPL CALCULATED.3IONS-SCNC: 14.3 MMOL/L (ref 5–15)
AST SERPL-CCNC: 17 U/L (ref 1–32)
BASOPHILS # BLD AUTO: 0.02 10*3/MM3 (ref 0–0.2)
BASOPHILS NFR BLD AUTO: 0.2 % (ref 0–1.5)
BILIRUB SERPL-MCNC: 0.3 MG/DL (ref 0–1.2)
BUN SERPL-MCNC: 12 MG/DL (ref 6–20)
BUN/CREAT SERPL: 18.8 (ref 7–25)
CALCIUM SPEC-SCNC: 9.9 MG/DL (ref 8.6–10.5)
CHLORIDE SERPL-SCNC: 102 MMOL/L (ref 98–107)
CO2 SERPL-SCNC: 23.7 MMOL/L (ref 22–29)
CREAT SERPL-MCNC: 0.64 MG/DL (ref 0.57–1)
DEPRECATED RDW RBC AUTO: 46.1 FL (ref 37–54)
EOSINOPHIL # BLD AUTO: 0.15 10*3/MM3 (ref 0–0.4)
EOSINOPHIL NFR BLD AUTO: 1.7 % (ref 0.3–6.2)
ERYTHROCYTE [DISTWIDTH] IN BLOOD BY AUTOMATED COUNT: 13.1 % (ref 12.3–15.4)
GFR SERPL CREATININE-BSD FRML MDRD: 111 ML/MIN/1.73
GLOBULIN UR ELPH-MCNC: 2.7 GM/DL
GLUCOSE SERPL-MCNC: 142 MG/DL (ref 65–99)
HCT VFR BLD AUTO: 44.6 % (ref 34–46.6)
HGB BLD-MCNC: 14.7 G/DL (ref 12–15.9)
HOLD SPECIMEN: NORMAL
IMM GRANULOCYTES # BLD AUTO: 0.03 10*3/MM3 (ref 0–0.05)
IMM GRANULOCYTES NFR BLD AUTO: 0.3 % (ref 0–0.5)
LIPASE SERPL-CCNC: 47 U/L (ref 13–60)
LYMPHOCYTES # BLD AUTO: 2.97 10*3/MM3 (ref 0.7–3.1)
LYMPHOCYTES NFR BLD AUTO: 33.4 % (ref 19.6–45.3)
MCH RBC QN AUTO: 31.4 PG (ref 26.6–33)
MCHC RBC AUTO-ENTMCNC: 33 G/DL (ref 31.5–35.7)
MCV RBC AUTO: 95.3 FL (ref 79–97)
MONOCYTES # BLD AUTO: 0.66 10*3/MM3 (ref 0.1–0.9)
MONOCYTES NFR BLD AUTO: 7.4 % (ref 5–12)
NEUTROPHILS NFR BLD AUTO: 5.06 10*3/MM3 (ref 1.7–7)
NEUTROPHILS NFR BLD AUTO: 57 % (ref 42.7–76)
NRBC BLD AUTO-RTO: 0 /100 WBC (ref 0–0.2)
PLATELET # BLD AUTO: 292 10*3/MM3 (ref 140–450)
PMV BLD AUTO: 9.9 FL (ref 6–12)
POTASSIUM SERPL-SCNC: 3.7 MMOL/L (ref 3.5–5.2)
PROT SERPL-MCNC: 7.4 G/DL (ref 6–8.5)
RBC # BLD AUTO: 4.68 10*6/MM3 (ref 3.77–5.28)
SODIUM SERPL-SCNC: 140 MMOL/L (ref 136–145)
WBC # BLD AUTO: 8.89 10*3/MM3 (ref 3.4–10.8)
WHOLE BLOOD HOLD SPECIMEN: NORMAL
WHOLE BLOOD HOLD SPECIMEN: NORMAL

## 2021-05-18 PROCEDURE — 85025 COMPLETE CBC W/AUTO DIFF WBC: CPT | Performed by: EMERGENCY MEDICINE

## 2021-05-18 PROCEDURE — 25010000002 ONDANSETRON PER 1 MG: Performed by: EMERGENCY MEDICINE

## 2021-05-18 PROCEDURE — 99283 EMERGENCY DEPT VISIT LOW MDM: CPT

## 2021-05-18 PROCEDURE — 80053 COMPREHEN METABOLIC PANEL: CPT | Performed by: EMERGENCY MEDICINE

## 2021-05-18 PROCEDURE — 96374 THER/PROPH/DIAG INJ IV PUSH: CPT

## 2021-05-18 PROCEDURE — 25010000002 KETOROLAC TROMETHAMINE PER 15 MG: Performed by: EMERGENCY MEDICINE

## 2021-05-18 PROCEDURE — 83690 ASSAY OF LIPASE: CPT | Performed by: EMERGENCY MEDICINE

## 2021-05-18 PROCEDURE — 96375 TX/PRO/DX INJ NEW DRUG ADDON: CPT

## 2021-05-18 RX ORDER — OMEPRAZOLE 20 MG/1
20 CAPSULE, DELAYED RELEASE ORAL DAILY
COMMUNITY

## 2021-05-18 RX ORDER — ONDANSETRON 2 MG/ML
4 INJECTION INTRAMUSCULAR; INTRAVENOUS ONCE
Status: COMPLETED | OUTPATIENT
Start: 2021-05-18 | End: 2021-05-18

## 2021-05-18 RX ORDER — KETOROLAC TROMETHAMINE 30 MG/ML
30 INJECTION, SOLUTION INTRAMUSCULAR; INTRAVENOUS EVERY 6 HOURS PRN
Status: DISCONTINUED | OUTPATIENT
Start: 2021-05-18 | End: 2021-05-18 | Stop reason: HOSPADM

## 2021-05-18 RX ORDER — SODIUM CHLORIDE 0.9 % (FLUSH) 0.9 %
10 SYRINGE (ML) INJECTION AS NEEDED
Status: DISCONTINUED | OUTPATIENT
Start: 2021-05-18 | End: 2021-05-18 | Stop reason: HOSPADM

## 2021-05-18 RX ADMIN — SODIUM CHLORIDE 1000 ML: 9 INJECTION, SOLUTION INTRAVENOUS at 02:17

## 2021-05-18 RX ADMIN — KETOROLAC TROMETHAMINE 30 MG: 30 INJECTION, SOLUTION INTRAMUSCULAR; INTRAVENOUS at 02:17

## 2021-05-18 RX ADMIN — ONDANSETRON 4 MG: 2 INJECTION INTRAMUSCULAR; INTRAVENOUS at 02:16

## 2022-05-03 ENCOUNTER — OFFICE VISIT (OUTPATIENT)
Dept: FAMILY MEDICINE CLINIC | Facility: CLINIC | Age: 27
End: 2022-05-03

## 2022-05-03 VITALS
BODY MASS INDEX: 27.68 KG/M2 | SYSTOLIC BLOOD PRESSURE: 116 MMHG | RESPIRATION RATE: 16 BRPM | OXYGEN SATURATION: 100 % | HEIGHT: 61 IN | WEIGHT: 146.6 LBS | TEMPERATURE: 98.2 F | HEART RATE: 82 BPM | DIASTOLIC BLOOD PRESSURE: 78 MMHG

## 2022-05-03 DIAGNOSIS — G50.0 TRIGEMINAL NEURALGIA OF LEFT SIDE OF FACE: ICD-10-CM

## 2022-05-03 DIAGNOSIS — R51.9 LEFT FACIAL PAIN: Primary | ICD-10-CM

## 2022-05-03 PROCEDURE — 99213 OFFICE O/P EST LOW 20 MIN: CPT | Performed by: FAMILY MEDICINE

## 2022-05-03 RX ORDER — CARBAMAZEPINE 200 MG/1
200 TABLET ORAL 2 TIMES DAILY
Qty: 60 TABLET | Refills: 0 | Status: SHIPPED | OUTPATIENT
Start: 2022-05-03

## 2022-05-03 RX ORDER — HYDROCODONE BITARTRATE AND ACETAMINOPHEN 5; 325 MG/1; MG/1
1 TABLET ORAL EVERY 8 HOURS PRN
Qty: 9 TABLET | Refills: 0 | Status: SHIPPED | OUTPATIENT
Start: 2022-05-03 | End: 2022-05-06

## 2022-05-03 NOTE — PROGRESS NOTES
Subjective   Courtney Saleh is a 26 y.o. female.     History of Present Illness   Mrs. Saleh presents today with complaint of persistent left-sided jaw and facial pain for several weeks.  Began following wisdom tooth extraction on the left side.  She describes it as a severe generally throbbing but often lancinating pain radiating across the left side of her face, teeth are sensitive as well.  No fever, severe congestion, purulent rhinorrhea, change in hearing, rash.    She has been using frequent dosing of Tylenol and ibuprofen with minimal short-term relief.    She has had follow-up with both her dentist as well as oral surgeon without reported diagnosis.  No apparent dental complication.  Plain x-rays reportedly normal.    The following portions of the patient's history were reviewed and updated as appropriate: allergies, current medications, past family history, past medical history, past social history, past surgical history and problem list.    Review of Systems   Constitutional: Positive for appetite change and fatigue. Negative for fever.   HENT: Positive for dental problem and ear pain. Negative for mouth sores, rhinorrhea, sore throat and trouble swallowing.    Eyes: Negative for pain, discharge and redness.   Respiratory: Negative for cough.    Cardiovascular: Negative for chest pain.   Gastrointestinal: Negative for diarrhea, nausea and vomiting.   Skin: Negative for rash.   Neurological: Positive for headaches.   Hematological: Negative for adenopathy.   Psychiatric/Behavioral: Positive for agitation, dysphoric mood and sleep disturbance. Negative for suicidal ideas. The patient is nervous/anxious.        Objective    Vitals:    05/03/22 0805   BP: 116/78   Pulse: 82   Resp: 16   Temp: 98.2 °F (36.8 °C)   SpO2: 100%     Body mass index is 27.7 kg/m².      05/03/22  0805   Weight: 66.5 kg (146 lb 9.6 oz)       Physical Exam  Vitals and nursing note reviewed.   Constitutional:       General: She is in  acute distress (due to pain).      Appearance: She is well-developed and well-groomed. She is not ill-appearing.   HENT:      Head: Normocephalic and atraumatic.      Jaw: Tenderness (mild) present. No trismus or malocclusion.        Comments: Pain pattern     Left Ear: Tympanic membrane, ear canal and external ear normal. No decreased hearing noted. No tenderness.      Nose: Nose normal.      Mouth/Throat:      Mouth: Mucous membranes are moist. No oral lesions.   Eyes:      Conjunctiva/sclera: Conjunctivae normal.   Lymphadenopathy:      Cervical: No cervical adenopathy.   Skin:     General: Skin is warm and dry.      Findings: No rash.   Neurological:      Mental Status: She is alert and oriented to person, place, and time.      Sensory: Sensation is intact.      Gait: Gait is intact.   Psychiatric:         Mood and Affect: Mood is anxious.         Behavior: Behavior normal. Behavior is cooperative.         Cognition and Memory: Cognition normal.         Assessment/Plan   Diagnoses and all orders for this visit:    1. Left facial pain (Primary)    2. Trigeminal neuralgia of left side of face  -     HYDROcodone-acetaminophen (NORCO) 5-325 MG per tablet; Take 1 tablet by mouth Every 8 (Eight) Hours As Needed for Severe Pain  for up to 3 days.  Dispense: 9 tablet; Refill: 0  -     carBAMazepine (TEGretol) 200 MG tablet; Take 1 tablet by mouth 2 (Two) Times a Day. May increase by 1 tablet each day if pain not controlled. Maximum dose 6 per day.  Dispense: 60 tablet; Refill: 0       Lancinating left-sided facial/jaw pain directly following dental/oral surgery highly suggestive of trigeminal neuralgia.  Risk/benefits and potential side effects of treatment options reviewed.  She voiced understanding wished to proceed with short-term prescription of Norco for hopeful immediate relief with regimen of carbamazepine with instructions on up titration.    Patient not currently on birth control but states that she has had  bilateral salpingectomy, unilateral oophorectomy.  We have discussed the absolute contraindication of use of carbamazepine in the setting of potential pregnancy.    If minimal improvement within the next 24 to 48 hours would consider further imaging.    Encourage routine follow-up with her PCP, follow-up sooner as needed.  Patient was encouraged to keep me informed of any acute changes, lack of improvement, or any new concerning symptoms.  Pt is aware of reasons to seek emergent care.  Patient voiced understanding of all instructions and denied further questions.  As part of patient's treatment plan I am prescribing a controlled substance.  The patient has been made aware of the appropriate use of such medications, including potential risk of somnolence, limited ability to drive and/or work safely, and potential for dependence and/or overdose.  It has also been made clear that these medications are for use by this patient only, without concomitant use of alcohol or other substances, unless prescribed.  KARINE reviewed.  Opioid risk assessment screen negative.    Please note that portions of this note may have been completed with a voice recognition program. Efforts were made to edit the dictations, but occasionally words are mistranscribed.

## 2022-05-17 ENCOUNTER — OFFICE VISIT (OUTPATIENT)
Dept: FAMILY MEDICINE CLINIC | Facility: CLINIC | Age: 27
End: 2022-05-17

## 2022-05-17 VITALS
TEMPERATURE: 97.8 F | SYSTOLIC BLOOD PRESSURE: 100 MMHG | HEIGHT: 61 IN | BODY MASS INDEX: 27.3 KG/M2 | DIASTOLIC BLOOD PRESSURE: 68 MMHG | HEART RATE: 88 BPM | RESPIRATION RATE: 16 BRPM | OXYGEN SATURATION: 100 % | WEIGHT: 144.6 LBS

## 2022-05-17 DIAGNOSIS — R59.1 LYMPHADENOPATHY: Primary | ICD-10-CM

## 2022-05-17 PROCEDURE — 99213 OFFICE O/P EST LOW 20 MIN: CPT | Performed by: NURSE PRACTITIONER

## 2022-05-17 NOTE — PROGRESS NOTES
"      Subjective     Chief Complaint:    Chief Complaint   Patient presents with   • Cyst     Knot on right side of neck by ear       History of Present Illness:   Notes swollen area on right side of her neck, is hurts to move her neck, some pain with swallowing, some tenderness to touch. Her right ear has been feeling clogged up, comes and goes.   No runny nose, sore throat, nasal congestion, no fever.     Review of Systems  CV- No chest pain, palpitations  Resp- No cough, dyspnea  GI- No N/V/D, abd pain  Neuro-No dizziness, headaches      I have reviewed and/or updated the patient's past medical, surgical, family, social history and problem list as appropriate.     Medications:    Current Outpatient Medications:   •  carBAMazepine (TEGretol) 200 MG tablet, Take 1 tablet by mouth 2 (Two) Times a Day. May increase by 1 tablet each day if pain not controlled. Maximum dose 6 per day., Disp: 60 tablet, Rfl: 0  •  cyclobenzaprine (FLEXERIL) 10 MG tablet, Take 1 tablet by mouth At Night As Needed for Muscle Spasms., Disp: 30 tablet, Rfl: 1  •  omeprazole (priLOSEC) 20 MG capsule, Take 20 mg by mouth Daily., Disp: , Rfl:   •  lansoprazole (Prevacid 24HR) 15 MG capsule, Take 1 capsule by mouth Daily for 30 days., Disp: 30 capsule, Rfl: 2    Allergies:  No Known Allergies    Objective     Vital Signs:   Vitals:    05/17/22 1532   BP: 100/68   Pulse: 88   Resp: 16   Temp: 97.8 °F (36.6 °C)   SpO2: 100%   Weight: 65.6 kg (144 lb 9.6 oz)   Height: 154.9 cm (61\")     Body mass index is 27.32 kg/m².    Physical Exam:    Physical Exam  Constitutional:       Appearance: She is well-developed.   HENT:      Head: Normocephalic and atraumatic.      Right Ear: Ear canal and external ear normal. Tympanic membrane is scarred.      Left Ear: Tympanic membrane, ear canal and external ear normal.      Nose: Nose normal.      Mouth/Throat:      Pharynx: Uvula midline.   Eyes:      Pupils: Pupils are equal, round, and reactive to light. "   Cardiovascular:      Rate and Rhythm: Normal rate and regular rhythm.      Heart sounds: Normal heart sounds. No murmur heard.    No friction rub. No gallop.   Pulmonary:      Effort: Pulmonary effort is normal.      Breath sounds: Normal breath sounds.   Abdominal:      General: Bowel sounds are normal.      Palpations: Abdomen is soft.      Tenderness: There is no abdominal tenderness.   Musculoskeletal:      Cervical back: Neck supple.   Lymphadenopathy:      Head:      Right side of head: Submandibular and tonsillar adenopathy present. No submental, preauricular or posterior auricular adenopathy.      Left side of head: No submental, submandibular, tonsillar, preauricular or posterior auricular adenopathy.      Cervical: No cervical adenopathy.   Skin:     General: Skin is warm and dry.   Neurological:      Mental Status: She is alert and oriented to person, place, and time.   Psychiatric:         Behavior: Behavior normal.         Assessment / Plan     Assessment/Plan:   Problem List Items Addressed This Visit    None     Visit Diagnoses     Lymphadenopathy    -  Primary        -- discussed lymphadenopathy, supportive care, watchful waiting    Follow up:  As needed    Electronically signed by RUTHIE Camacho   05/17/2022 15:54 EDT      Please note that portions of this note may have been completed with a voice recognition program. Efforts were made to edit the dictations, but occasionally words are mistranscribed.

## 2022-06-08 ENCOUNTER — OFFICE VISIT (OUTPATIENT)
Dept: FAMILY MEDICINE CLINIC | Facility: CLINIC | Age: 27
End: 2022-06-08

## 2022-06-08 VITALS
DIASTOLIC BLOOD PRESSURE: 78 MMHG | HEART RATE: 74 BPM | WEIGHT: 141.6 LBS | OXYGEN SATURATION: 99 % | TEMPERATURE: 98.4 F | SYSTOLIC BLOOD PRESSURE: 116 MMHG | HEIGHT: 61 IN | RESPIRATION RATE: 16 BRPM | BODY MASS INDEX: 26.73 KG/M2

## 2022-06-08 DIAGNOSIS — N89.8 VAGINAL DISCHARGE: Primary | ICD-10-CM

## 2022-06-08 DIAGNOSIS — N94.9 VAGINAL DISCOMFORT: ICD-10-CM

## 2022-06-08 PROCEDURE — 99213 OFFICE O/P EST LOW 20 MIN: CPT | Performed by: NURSE PRACTITIONER

## 2022-06-08 RX ORDER — FLUCONAZOLE 150 MG/1
TABLET ORAL
Qty: 2 TABLET | Refills: 0 | Status: SHIPPED | OUTPATIENT
Start: 2022-06-08 | End: 2022-08-17

## 2022-06-08 NOTE — PROGRESS NOTES
"      Subjective     Chief Complaint:    Chief Complaint   Patient presents with   • Vaginal Pain     DISCOMFORT       History of Present Illness:   She is having vaginal itching and pain. Started on Monday. Just finished her period. She is having soreness    Review of Systems  Gen- No fevers, chills  CV- No chest pain, palpitations  Resp- No cough, dyspnea  GI- No N/V/D, abd pain  Neuro-No dizziness, headaches      I have reviewed and/or updated the patient's past medical, surgical, family, social history and problem list as appropriate.     Medications:    Current Outpatient Medications:   •  carBAMazepine (TEGretol) 200 MG tablet, Take 1 tablet by mouth 2 (Two) Times a Day. May increase by 1 tablet each day if pain not controlled. Maximum dose 6 per day., Disp: 60 tablet, Rfl: 0  •  cyclobenzaprine (FLEXERIL) 10 MG tablet, Take 1 tablet by mouth At Night As Needed for Muscle Spasms., Disp: 30 tablet, Rfl: 1  •  omeprazole (priLOSEC) 20 MG capsule, Take 20 mg by mouth Daily., Disp: , Rfl:   •  fluconazole (Diflucan) 150 MG tablet, Take 1 tablet by mouth now, may repeat in 3 days as needed, Disp: 2 tablet, Rfl: 0  •  lansoprazole (Prevacid 24HR) 15 MG capsule, Take 1 capsule by mouth Daily for 30 days., Disp: 30 capsule, Rfl: 2    Allergies:  No Known Allergies    Objective     Vital Signs:   Vitals:    06/08/22 1703   BP: 116/78   Pulse: 74   Resp: 16   Temp: 98.4 °F (36.9 °C)   SpO2: 99%   Weight: 64.2 kg (141 lb 9.6 oz)   Height: 154.9 cm (61\")     Body mass index is 26.76 kg/m².    Physical Exam:    Physical Exam  Vitals and nursing note reviewed.   Constitutional:       Appearance: She is well-developed.   HENT:      Head: Normocephalic and atraumatic.   Eyes:      Pupils: Pupils are equal, round, and reactive to light.   Cardiovascular:      Rate and Rhythm: Normal rate.   Pulmonary:      Effort: Pulmonary effort is normal.   Musculoskeletal:      Cervical back: Neck supple.   Skin:     General: Skin is warm " and dry.      Capillary Refill: Capillary refill takes less than 2 seconds.   Neurological:      General: No focal deficit present.      Mental Status: She is alert and oriented to person, place, and time.   Psychiatric:         Mood and Affect: Mood normal.         Behavior: Behavior normal.         Assessment / Plan     Assessment/Plan:   Problem List Items Addressed This Visit    None     Visit Diagnoses     Vaginal discharge    -  Primary    Relevant Medications    fluconazole (Diflucan) 150 MG tablet    Other Relevant Orders    NuSwab VG+ - Swab, Vagina    Vaginal discomfort        Relevant Medications    fluconazole (Diflucan) 150 MG tablet    Other Relevant Orders    NuSwab VG+ - Swab, Vagina        -- will treat prophylactically for yeast.  Check new swab      Follow up:  As needed    Electronically signed by RUTHIE Camacho   06/08/2022 17:23 EDT      Please note that portions of this note may have been completed with a voice recognition program. Efforts were made to edit the dictations, but occasionally words are mistranscribed.

## 2022-06-10 LAB
A VAGINAE DNA VAG QL NAA+PROBE: ABNORMAL SCORE
BVAB2 DNA VAG QL NAA+PROBE: ABNORMAL SCORE
C ALBICANS DNA VAG QL NAA+PROBE: POSITIVE
C GLABRATA DNA VAG QL NAA+PROBE: NEGATIVE
C TRACH DNA VAG QL NAA+PROBE: NEGATIVE
MEGA1 DNA VAG QL NAA+PROBE: ABNORMAL SCORE
N GONORRHOEA DNA VAG QL NAA+PROBE: NEGATIVE
T VAGINALIS DNA VAG QL NAA+PROBE: NEGATIVE

## 2022-06-17 RX ORDER — FLUCONAZOLE 150 MG/1
150 TABLET ORAL ONCE
Qty: 1 TABLET | Refills: 0 | Status: SHIPPED | OUTPATIENT
Start: 2022-06-17 | End: 2022-06-17

## 2022-06-24 ENCOUNTER — TELEPHONE (OUTPATIENT)
Dept: FAMILY MEDICINE CLINIC | Facility: CLINIC | Age: 27
End: 2022-06-24

## 2022-06-24 NOTE — TELEPHONE ENCOUNTER
Patient said she is still having problems and is suppose to have another vaginal exam. There are only same day available. Please advise.

## 2022-06-24 NOTE — TELEPHONE ENCOUNTER
Ishan Blevins, I meant to send this to [facility] since they have been discussing this issue. [facility], would you be able to work in next week, or refer to GYN?  Lesley is going to be out for 2 weeks. Thanks

## 2022-06-27 ENCOUNTER — OFFICE VISIT (OUTPATIENT)
Dept: FAMILY MEDICINE CLINIC | Facility: CLINIC | Age: 27
End: 2022-06-27

## 2022-06-27 VITALS
BODY MASS INDEX: 26.83 KG/M2 | HEART RATE: 77 BPM | DIASTOLIC BLOOD PRESSURE: 74 MMHG | HEIGHT: 61 IN | OXYGEN SATURATION: 100 % | SYSTOLIC BLOOD PRESSURE: 116 MMHG | WEIGHT: 142.13 LBS

## 2022-06-27 DIAGNOSIS — N89.8 VAGINAL DISCHARGE: Primary | ICD-10-CM

## 2022-06-27 PROCEDURE — 99214 OFFICE O/P EST MOD 30 MIN: CPT | Performed by: NURSE PRACTITIONER

## 2022-06-27 RX ORDER — METRONIDAZOLE 500 MG/1
500 TABLET ORAL 2 TIMES DAILY
Qty: 14 TABLET | Refills: 0 | Status: SHIPPED | OUTPATIENT
Start: 2022-06-27 | End: 2022-07-04

## 2022-06-27 NOTE — PROGRESS NOTES
"      Subjective     Chief Complaint:    Chief Complaint   Patient presents with   • Vaginal Discharge     Complaints of vaginal discharge.       History of Present Illness:   Here with continued persistent vaginal discharge. Was diagnosed with yeast infection and has taking diflucan. She continues to have discharge and odor. Tenderness, swelling, and itching has improved.     Review of Systems  Gen- No fevers, chills  CV- No chest pain, palpitations  Resp- No cough, dyspnea  GI- No N/V/D, abd pain  Neuro-No dizziness, headaches      I have reviewed and/or updated the patient's past medical, surgical, family, social history and problem list as appropriate.     Medications:    Current Outpatient Medications:   •  carBAMazepine (TEGretol) 200 MG tablet, Take 1 tablet by mouth 2 (Two) Times a Day. May increase by 1 tablet each day if pain not controlled. Maximum dose 6 per day., Disp: 60 tablet, Rfl: 0  •  cyclobenzaprine (FLEXERIL) 10 MG tablet, Take 1 tablet by mouth At Night As Needed for Muscle Spasms., Disp: 30 tablet, Rfl: 1  •  omeprazole (priLOSEC) 20 MG capsule, Take 20 mg by mouth Daily., Disp: , Rfl:   •  fluconazole (Diflucan) 150 MG tablet, Take 1 tablet by mouth now, may repeat in 3 days as needed, Disp: 2 tablet, Rfl: 0  •  lansoprazole (Prevacid 24HR) 15 MG capsule, Take 1 capsule by mouth Daily for 30 days., Disp: 30 capsule, Rfl: 2  •  metroNIDAZOLE (Flagyl) 500 MG tablet, Take 1 tablet by mouth 2 (Two) Times a Day for 7 days., Disp: 14 tablet, Rfl: 0    Allergies:  No Known Allergies    Objective     Vital Signs:   Vitals:    06/27/22 1523   BP: 116/74   Pulse: 77   SpO2: 100%   Weight: 64.5 kg (142 lb 2 oz)   Height: 154.9 cm (60.98\")     Body mass index is 26.87 kg/m².    Physical Exam:    Physical Exam  Vitals and nursing note reviewed.   Constitutional:       Appearance: Normal appearance. She is well-developed.   HENT:      Head: Normocephalic and atraumatic.   Eyes:      Pupils: Pupils are " equal, round, and reactive to light.   Cardiovascular:      Rate and Rhythm: Normal rate and regular rhythm.      Heart sounds: Normal heart sounds.   Pulmonary:      Effort: Pulmonary effort is normal.      Breath sounds: Normal breath sounds.   Genitourinary:     General: Normal vulva.      Vagina: Vaginal discharge present.      Cervix: Discharge present.   Musculoskeletal:      Cervical back: Neck supple.   Skin:     General: Skin is warm and dry.      Capillary Refill: Capillary refill takes less than 2 seconds.   Neurological:      General: No focal deficit present.      Mental Status: She is alert and oriented to person, place, and time.   Psychiatric:         Mood and Affect: Mood normal.         Behavior: Behavior normal.         Assessment / Plan     Assessment/Plan:   Problem List Items Addressed This Visit    None     Visit Diagnoses     Vaginal discharge    -  Primary    Relevant Medications    metroNIDAZOLE (Flagyl) 500 MG tablet    Other Relevant Orders    NuSwab VG+ - Swab, Vagina        -- swab obtain, empiric treatment for BV with flagyl, follow swab    Follow up:  As needed    Electronically signed by RUTHIE Camacho   06/27/2022 15:42 EDT      Please note that portions of this note may have been completed with a voice recognition program. Efforts were made to edit the dictations, but occasionally words are mistranscribed.

## 2022-06-27 NOTE — TELEPHONE ENCOUNTER
Put her on a same-day spot.  Vaginal itching and discharge is an appropriate same-day issue.  She can call in the morning and get on a same-day spot.

## 2022-06-29 LAB
A VAGINAE DNA VAG QL NAA+PROBE: ABNORMAL SCORE
BVAB2 DNA VAG QL NAA+PROBE: ABNORMAL SCORE
C ALBICANS DNA VAG QL NAA+PROBE: NEGATIVE
C GLABRATA DNA VAG QL NAA+PROBE: NEGATIVE
C TRACH DNA VAG QL NAA+PROBE: NEGATIVE
MEGA1 DNA VAG QL NAA+PROBE: ABNORMAL SCORE
N GONORRHOEA DNA VAG QL NAA+PROBE: NEGATIVE
T VAGINALIS DNA VAG QL NAA+PROBE: NEGATIVE

## 2022-08-17 ENCOUNTER — OFFICE VISIT (OUTPATIENT)
Dept: FAMILY MEDICINE CLINIC | Facility: CLINIC | Age: 27
End: 2022-08-17

## 2022-08-17 VITALS
HEART RATE: 88 BPM | WEIGHT: 134.2 LBS | DIASTOLIC BLOOD PRESSURE: 78 MMHG | BODY MASS INDEX: 25.34 KG/M2 | TEMPERATURE: 97.3 F | OXYGEN SATURATION: 100 % | SYSTOLIC BLOOD PRESSURE: 120 MMHG | HEIGHT: 61 IN

## 2022-08-17 DIAGNOSIS — B96.89 BACTERIAL VAGINOSIS: Primary | ICD-10-CM

## 2022-08-17 DIAGNOSIS — N76.0 BACTERIAL VAGINOSIS: Primary | ICD-10-CM

## 2022-08-17 PROCEDURE — 99213 OFFICE O/P EST LOW 20 MIN: CPT | Performed by: NURSE PRACTITIONER

## 2022-08-17 RX ORDER — METRONIDAZOLE 7.5 MG/G
GEL VAGINAL NIGHTLY
Qty: 70 G | Refills: 0 | Status: SHIPPED | OUTPATIENT
Start: 2022-08-17 | End: 2022-08-22

## 2022-08-17 NOTE — PROGRESS NOTES
"      Subjective     Chief Complaint:    Chief Complaint   Patient presents with   • Vaginal Discharge     Pt sts Dx with bacterial vaginosis 06/27/22  She sts every time she has her period it comes back       History of Present Illness:   Notes continued issues with vaginal discharge and foul odor after her period. She was treated for BV in June. Since then this issue has occurred.       Review of Systems  Gen- No fevers, chills  CV- No chest pain, palpitations  Resp- No cough, dyspnea  GI- No N/V/D, abd pain  Neuro-No dizziness, headaches      I have reviewed and/or updated the patient's past medical, surgical, family, social history and problem list as appropriate.     Medications:    Current Outpatient Medications:   •  carBAMazepine (TEGretol) 200 MG tablet, Take 1 tablet by mouth 2 (Two) Times a Day. May increase by 1 tablet each day if pain not controlled. Maximum dose 6 per day., Disp: 60 tablet, Rfl: 0  •  omeprazole (priLOSEC) 20 MG capsule, Take 20 mg by mouth Daily., Disp: , Rfl:   •  lansoprazole (Prevacid 24HR) 15 MG capsule, Take 1 capsule by mouth Daily for 30 days., Disp: 30 capsule, Rfl: 2  •  metroNIDAZOLE (METROGEL VAGINAL) 0.75 % vaginal gel, Insert  into the vagina Every Night for 5 days., Disp: 70 g, Rfl: 0    Allergies:  No Known Allergies    Objective     Vital Signs:   Vitals:    08/17/22 1556   BP: 120/78   Pulse: 88   Temp: 97.3 °F (36.3 °C)   SpO2: 100%   Weight: 60.9 kg (134 lb 3.2 oz)   Height: 154.9 cm (60.98\")   PainSc: 0-No pain     Body mass index is 25.37 kg/m².    Physical Exam:    Physical Exam  Vitals and nursing note reviewed.   Constitutional:       Appearance: She is well-developed.   HENT:      Head: Normocephalic and atraumatic.   Eyes:      Pupils: Pupils are equal, round, and reactive to light.   Cardiovascular:      Rate and Rhythm: Normal rate.   Pulmonary:      Effort: Pulmonary effort is normal.   Musculoskeletal:      Cervical back: Neck supple.   Skin:     " Capillary Refill: Capillary refill takes less than 2 seconds.   Neurological:      General: No focal deficit present.      Mental Status: She is alert and oriented to person, place, and time.   Psychiatric:         Mood and Affect: Mood normal.         Behavior: Behavior normal.         Assessment / Plan     Assessment/Plan:   Problem List Items Addressed This Visit    None     Visit Diagnoses     Bacterial vaginosis    -  Primary    Relevant Medications    metroNIDAZOLE (METROGEL VAGINAL) 0.75 % vaginal gel    Other Relevant Orders    NuSwab VG+ - Swab, Vagina        -- self swab, empiric treat with metrogel     Follow up:  As needed    Electronically signed by RUTHIE Camacho   08/17/2022 16:05 EDT      Please note that portions of this note may have been completed with a voice recognition program. Efforts were made to edit the dictations, but occasionally words are mistranscribed.

## 2022-09-07 ENCOUNTER — APPOINTMENT (OUTPATIENT)
Dept: GENERAL RADIOLOGY | Facility: HOSPITAL | Age: 27
End: 2022-09-07

## 2022-09-07 ENCOUNTER — HOSPITAL ENCOUNTER (EMERGENCY)
Facility: HOSPITAL | Age: 27
Discharge: HOME | End: 2022-09-07
Attending: EMERGENCY MEDICINE | Admitting: EMERGENCY MEDICINE

## 2022-09-07 VITALS
WEIGHT: 132 LBS | HEIGHT: 61 IN | SYSTOLIC BLOOD PRESSURE: 100 MMHG | BODY MASS INDEX: 24.92 KG/M2 | HEART RATE: 84 BPM | DIASTOLIC BLOOD PRESSURE: 81 MMHG | RESPIRATION RATE: 16 BRPM | OXYGEN SATURATION: 98 % | TEMPERATURE: 97.8 F

## 2022-09-07 DIAGNOSIS — Z87.09 HISTORY OF ASTHMA: ICD-10-CM

## 2022-09-07 DIAGNOSIS — U07.1 BRONCHITIS DUE TO COVID-19 VIRUS: Primary | ICD-10-CM

## 2022-09-07 DIAGNOSIS — J40 BRONCHITIS DUE TO COVID-19 VIRUS: Primary | ICD-10-CM

## 2022-09-07 PROCEDURE — 93005 ELECTROCARDIOGRAM TRACING: CPT | Performed by: EMERGENCY MEDICINE

## 2022-09-07 PROCEDURE — 71045 X-RAY EXAM CHEST 1 VIEW: CPT

## 2022-09-07 PROCEDURE — 94640 AIRWAY INHALATION TREATMENT: CPT

## 2022-09-07 PROCEDURE — 99283 EMERGENCY DEPT VISIT LOW MDM: CPT

## 2022-09-07 RX ORDER — ALBUTEROL SULFATE 90 UG/1
2 AEROSOL, METERED RESPIRATORY (INHALATION) ONCE
Status: COMPLETED | OUTPATIENT
Start: 2022-09-07 | End: 2022-09-07

## 2022-09-07 RX ORDER — AZITHROMYCIN 250 MG/1
500 TABLET, FILM COATED ORAL ONCE
Status: COMPLETED | OUTPATIENT
Start: 2022-09-07 | End: 2022-09-07

## 2022-09-07 RX ORDER — AZITHROMYCIN 250 MG/1
TABLET, FILM COATED ORAL
Qty: 4 TABLET | Refills: 0 | Status: SHIPPED | OUTPATIENT
Start: 2022-09-07

## 2022-09-07 RX ADMIN — ALBUTEROL SULFATE 2 PUFF: 90 AEROSOL, METERED RESPIRATORY (INHALATION) at 03:18

## 2022-09-07 RX ADMIN — AZITHROMYCIN MONOHYDRATE 500 MG: 250 TABLET ORAL at 03:18

## 2022-09-07 NOTE — ED PROVIDER NOTES
Subjective   PIT    History of Present Illness    Chief Complaint: Chest tightness productive cough  History of Present Illness: 26-year-old female 1 week out from positive COVID infection, here with above complaint.  States she has thick phlegmy cough.  Pain with breathing.  History of asthma.  States she did have loss of taste and smell briefly when she had COVID and felt like she might be dehydrated.  Onset: Ongoing issue over the last few days  Duration: Persist  Exacerbating / Alleviating factors: None  Associated symptoms: None      Nurses Notes reviewed and agree, including vitals, allergies, social history and prior medical history.     REVIEW OF SYSTEMS: All systems reviewed and not pertinent unless noted.    Positive for: Chest tightness productive cough, positive for COVID 1 week prior    Negative for: Fever hemoptysis calf pain  Review of Systems    Past Medical History:   Diagnosis Date   • ADHD (attention deficit hyperactivity disorder)    • Asthma    • Bipolar disorder (HCC)    • Bronchitis    • Depression    • GERD (gastroesophageal reflux disease)    • History of gastric surgery     gastric sleeve   • Hyperlipidemia    • Migraine    • Ovarian cyst    • Polycystic ovarian syndrome        No Known Allergies    Past Surgical History:   Procedure Laterality Date   • BARIATRIC SURGERY  9/25/2020   • GASTRIC SLEEVE LAPAROSCOPIC     • MOUTH SURGERY     • MOUTH SURGERY      2008   • OOPHORECTOMY      2014       Family History   Problem Relation Age of Onset   • Arthritis Mother    • Hypertension Mother    • Mental illness Mother    • Obesity Mother    • Anxiety disorder Mother    • Depression Mother    • Hyperlipidemia Mother    • Hyperlipidemia Father    • Asthma Father    • Colon cancer Maternal Grandmother    • Diabetes Maternal Grandfather    • Diabetes Paternal Grandfather        Social History     Socioeconomic History   • Marital status: Single   Tobacco Use   • Smoking status: Current Every Day  "Smoker     Packs/day: 0.50     Years: 15.00     Pack years: 7.50     Types: Cigarettes   • Smokeless tobacco: Never Used   Substance and Sexual Activity   • Alcohol use: Not Currently     Comment: rarely   • Drug use: No   • Sexual activity: Yes     Partners: Male     Birth control/protection: None           Objective   Physical Exam  /81 (Patient Position: Standing)   Pulse 84   Temp 97.8 °F (36.6 °C) (Oral)   Resp 16   Ht 154.9 cm (61\")   Wt 59.9 kg (132 lb)   SpO2 98%   BMI 24.94 kg/m²     CONSTITUTIONAL: Well developed, nontoxic healthy-appearing 26-year-old  female,  in no acute distress.  VITAL SIGNS: per nursing, reviewed and noted  SKIN: exposed skin with no rashes, ulcerations or petechiae  EYES: Grossly EOMI, no icterus  ENT: Normal voice.  No posterior pharyngeal erythema or exudate.  TM and nares clear bilaterally  RESPIRATORY:  No increased work of breathing. No retractions.   CARDIOVASCULAR:  regular rate and rhythm, no murmurs.  Good Peripheral pulses. Good cap refill to extremities.   GI: Abdomen soft, nontender, normal bowel sounds. No hernia. No ascites.  MUSCULOSKELETAL:  No tenderness. Full ROM. Strength and tone grossly normal.  no spasms. no neck or back tenderness or spasm.   NEUROLOGIC: Alert, oriented x 3. No gross deficits. GCS 15.   PSYCH: appropriate affect.  : no bladder tenderness or distention, no CVA tenderness      Procedures     No attending physician procedures were performed on this patient.      ED Course  ED Course as of 09/07/22 0329   Wed Sep 07, 2022   0224 EKG interpreted by me reveals sinus rhythm rate 73.  No ectopy no ischemic changes. [PF]      ED Course User Index  [PF] Keith Cartagena,       Chest x-ray interpreted by me shows no evidence of any cardiomegaly, effusion, infiltrate, or bony abnormality.                                     MDM  Patient presented for evaluation above complaint.  Does have a history of asthma.  Reports productive " cough.  We will add Zithromax to cover for potential bacterial component.  Added inhaler.  No tachycardia and orthostasis.  Nonischemic EKG.  Chest x-ray without acute findings per my interpretation.  PERC negative.  Supportive care outpatient follow return precautions discussed.  Final diagnoses:   Bronchitis due to COVID-19 virus   History of asthma       ED Disposition  ED Disposition     ED Disposition   Discharge    Condition   Stable    Comment   --             Felicity Troy, APRN  [address] [place] [id number] [phone]          [facility]  [address] [facility] [address] [phone]    As needed, If symptoms worsen         Medication List      New Prescriptions    azithromycin 250 MG tablet  Commonly known as: ZITHROMAX  Take 1 tablet daily for 4 days.           Where to Get Your Medications      These medications were sent to [facility] [address] [place] [address] - [address] [facility] - [phone]  - [phone]  [address] [facility] [address]    Phone: [phone]   · azithromycin 250 MG tablet          Keith Cartagena,   09/07/22 0329
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
## 2023-05-11 ENCOUNTER — OFFICE VISIT (OUTPATIENT)
Dept: PSYCHIATRY | Facility: CLINIC | Age: 28
End: 2023-05-11
Payer: COMMERCIAL

## 2023-05-11 VITALS
WEIGHT: 145 LBS | BODY MASS INDEX: 27.38 KG/M2 | SYSTOLIC BLOOD PRESSURE: 116 MMHG | HEART RATE: 92 BPM | DIASTOLIC BLOOD PRESSURE: 84 MMHG | HEIGHT: 61 IN

## 2023-05-11 DIAGNOSIS — F90.2 ADHD (ATTENTION DEFICIT HYPERACTIVITY DISORDER), COMBINED TYPE: Chronic | ICD-10-CM

## 2023-05-11 DIAGNOSIS — F41.1 GAD (GENERALIZED ANXIETY DISORDER): Primary | Chronic | ICD-10-CM

## 2023-05-11 DIAGNOSIS — F33.1 MODERATE RECURRENT MAJOR DEPRESSION: Chronic | ICD-10-CM

## 2023-05-11 PROCEDURE — 90792 PSYCH DIAG EVAL W/MED SRVCS: CPT | Performed by: NURSE PRACTITIONER

## 2023-05-11 RX ORDER — ESCITALOPRAM OXALATE 10 MG/1
10 TABLET ORAL DAILY
Qty: 30 TABLET | Refills: 2 | Status: SHIPPED | OUTPATIENT
Start: 2023-05-11 | End: 2024-05-10

## 2023-06-11 NOTE — PROGRESS NOTES
"This provider is located at [facility] ,[address], [address] in [place], [place],using a secure MyChart Video Visit through [employer]. Patient is being seen remotely via telehealth at their home address in [place], and stated they are in a secure environment for this session. The patient's condition being diagnosed/treated is appropriate for telemedicine. The provider identified herself as well as her credentials.   The patient, and/or patients guardian, consent to be seen remotely, and when consent is given they understand that the consent allows for patient identifiable information to be sent to a third party as needed.   They may refuse to be seen remotely at any time. The electronic data is encrypted and password protected, and the patient and/or guardian has been advised of the potential risks to privacy not withstanding such measures.    Chief Complaint  Depression, anxiety, and ADHD, combined type    Subjective          Courtney Saleh presents via MyChart Video through [facility] by herself for a follow up and medication check.     History of Present Illness: Courtney states, \"I really like the Lexapro.  It works really well.  Courtney tells me that Lexapro has improved her anxiety and decreased racing thoughts.  She tells me she continues to struggle with concentration and focus but feels that it is related to ADHD symptoms.  She denies any problems with sleep or appetite.  She continues to work and care for her daughter.  She looked into starting Vyvanse with her insurance and notes the cost would be as much as $365 per month which is unaffordable to the patient.  She reports being compliant with her medications.  She is taking Lexapro 10 mg nightly.  She denies any SI/HI/AVH.      Objective   Vital Signs:   There were no vitals taken for this visit.      PHQ-9 Score:   PHQ-9 Total Score:       SOFIA-7 Score:       Mental Status Exam:   Hygiene:   good  Cooperation:  " Cooperative  Eye Contact:  Good  Psychomotor Behavior:  Restless  Affect:  Appropriate  Mood: normal  Speech:  Normal  Thought Process:  Goal directed and Linear  Thought Content:  Normal  Suicidal:  None  Homicidal:  None  Hallucinations:  None  Delusion:  None  Memory:  Intact  Orientation:  Person, Place, Time and Situation  Reliability:  good  Insight:  Good  Judgement:  Good  Impulse Control:  Good  Physical/Medical Issues:  Yes GERD      Current Medications:   Current Outpatient Medications   Medication Sig Dispense Refill   • escitalopram (Lexapro) 10 MG tablet Take 1 tablet by mouth Daily. 30 tablet 2   • omeprazole (priLOSEC) 20 MG capsule Take 1 capsule by mouth Daily.     • amphetamine-dextroamphetamine (Adderall) 10 MG tablet Take 1 tablet by mouth Daily. 30 tablet 0   • lansoprazole (Prevacid 24HR) 15 MG capsule Take 1 capsule by mouth Daily for 30 days. 30 capsule 2     No current facility-administered medications for this visit.   Physical Exam  Nursing note reviewed. Vitals reviewed: Dose not obtained due to nature of telehealth visit.   Constitutional:       Appearance: Normal appearance. She is well-developed and normal weight.   Neurological:      General: No focal deficit present.      Mental Status: She is alert and oriented to person, place, and time.   Psychiatric:         Attention and Perception: Attention normal.         Mood and Affect: Mood normal.         Speech: Speech normal.         Behavior: Behavior normal. Hyperactive: restless. Behavior is cooperative.         Thought Content: Thought content normal.         Cognition and Memory: Cognition normal.         Judgment: Judgment normal.        Result Review :          Assessment and Plan    Diagnoses and all orders for this visit:    1. ADHD (attention deficit hyperactivity disorder), combined type (Primary)  -     Urine Drug Screen - Urine, Clean Catch; Future  -     amphetamine-dextroamphetamine (Adderall) 10 MG tablet; Take 1 tablet  by mouth Daily.  Dispense: 30 tablet; Refill: 0    2. SOFIA (generalized anxiety disorder)    3. Moderate recurrent major depression         Impression:  -This is a follow up and medication check. Courtney reports improved anxiety.  She feels that she has less racing thoughts and worries.  She does still continue to struggle with symptoms of ADHD like difficulty staying on task, being easily distracted by extraneous stimuli, and difficulty completing task.  She has considered treating her ADHD with Vyvanse but feels it is too expensive after checking with insurance.  We discussed alternatives including using the amphetamine versus methylphenidate line of medications.  She has used Adderall in the past as well as Concerta.  She is also aware there is a national Adderall shortage.  However, we weighed the pros and cons of both medications and she feels Adderall may be more beneficial.  She agrees to completing a urine drug screen and signing a controlled substance agreement to start treating her ADHD.  She is aware not to combine stimulants with illicit substances like cannabis or alcohol.  She agrees to meet back in 6 weeks to assess efficacy and understands that she will need to call for refills and update provider on how she is doing at her current dose of medication.  -Initiate Adderall XR 10 mg daily for ADHD symptoms.  -Continue Lexapro 10 mg nightly for depression and anxiety.  -The [facility] report, request #[id number], of the past 12 months were reviewed and is appropriate.  The patient/guardian reports taking the medication only as prescribed.  The patient/guardian denies any abuse or misuse of the medication.  The patient/guardian denies any other substance use or issues.  There are no apparent substance related issues.  The patient reports no side effects of the current medication usage.  The patient/guardian has reported significant improvement with medication usage and wishes to continue medication as prescribed.   The patient/guardian is appropriate to continue with current medication usage at this time.  Reinforced risks and side effects of medication usage, patient and/or guardian verbalize understanding in their own words and are in agreement with current plan.  -Obtain UDS.  I am expecting patient to be negative for all substances.  -The patient has read and signed the [facility] Controlled Substance Contract. We discussed the risks and benefits of the use of controlled substances, including the risk of tolerance and drug dependence. Anorectic medications can be prescribed by one provider at a time and dispensed from one facility at a time, they can only be taken as prescribed, and we are not obligated to refill them if lost or stolen. The refills are only during regular clinic hours. [facility] report was pulled on patient, reviewed and found to be appropriate.     TREATMENT PLAN/GOALS: Continue supportive psychotherapy efforts and medications as indicated. Treatment and medication options discussed during today's visit. Patient ackowledged and verbally consented to continue with current treatment plan and was educated on the importance of compliance with treatment and follow-up appointments.    MEDICATION ISSUES:    We discussed risks, benefits, and side effects of the above medications and the patient was agreeable with the plan. Patient was educated on the importance of compliance with treatment and follow-up appointments.  Patient is agreeable to call the office with any worsening of symptoms or onset of side effects. Patient is agreeable to call 911 or go to the nearest ER should he/she begin having SI/HI.      Counseled patient regarding multimodal approach with healthy nutrition, healthy sleep, regular physical activity, social activities, counseling, and medications.      Coping skills reviewed and encouraged positive framing of thoughts     Assisted patient in processing above session content; acknowledged and  normalized patient's thoughts, feelings, and concerns.  Applied  positive coping skills and behavior management in session.  Allowed patient to freely discuss issues without interruption or judgment. Provided safe, confidential environment to facilitate the development of positive therapeutic relationship and encourage open, honest communication. Assisted patient in identifying risk factors which would indicate the need for higher level of care including thoughts to harm self or others and/or self-harming behavior and encouraged patient to contact this office, call 911, or present to the nearest emergency room should any of these events occur. Discussed crisis intervention services and means to access.     MEDS ORDERED DURING VISIT:  New Medications Ordered This Visit   Medications   • amphetamine-dextroamphetamine (Adderall) 10 MG tablet     Sig: Take 1 tablet by mouth Daily.     Dispense:  30 tablet     Refill:  0           Follow Up   Return in about 6 weeks (around 7/25/2023) for Medication Check.    Patient was given instructions and counseling regarding her condition or for health maintenance advice. Please see specific information pulled into the AVS if appropriate.     This document has been electronically signed by RUTHIE Brannon  June 13, 2023 17:42 EDT      This document has been electronically signed by RUTHIE Valentin, PMHNP-BC  June 13, 2023 17:42 EDT    Part of this note may be an electronic transcription/translation of spoken language to printed text using the Dragon Dictation System.

## 2023-06-13 ENCOUNTER — TELEMEDICINE (OUTPATIENT)
Dept: PSYCHIATRY | Facility: CLINIC | Age: 28
End: 2023-06-13
Payer: COMMERCIAL

## 2023-06-13 ENCOUNTER — LAB (OUTPATIENT)
Dept: LAB | Facility: HOSPITAL | Age: 28
End: 2023-06-13
Payer: COMMERCIAL

## 2023-06-13 DIAGNOSIS — F90.2 ADHD (ATTENTION DEFICIT HYPERACTIVITY DISORDER), COMBINED TYPE: Primary | Chronic | ICD-10-CM

## 2023-06-13 DIAGNOSIS — F41.1 GAD (GENERALIZED ANXIETY DISORDER): Chronic | ICD-10-CM

## 2023-06-13 DIAGNOSIS — F33.1 MODERATE RECURRENT MAJOR DEPRESSION: Chronic | ICD-10-CM

## 2023-06-13 DIAGNOSIS — F90.2 ADHD (ATTENTION DEFICIT HYPERACTIVITY DISORDER), COMBINED TYPE: ICD-10-CM

## 2023-06-13 LAB
AMPHET+METHAMPHET UR QL: NEGATIVE
AMPHETAMINES UR QL: NEGATIVE
BARBITURATES UR QL SCN: NEGATIVE
BENZODIAZ UR QL SCN: NEGATIVE
BUPRENORPHINE SERPL-MCNC: NEGATIVE NG/ML
CANNABINOIDS SERPL QL: NEGATIVE
COCAINE UR QL: NEGATIVE
METHADONE UR QL SCN: NEGATIVE
OPIATES UR QL: NEGATIVE
OXYCODONE UR QL SCN: NEGATIVE
PCP UR QL SCN: NEGATIVE
PROPOXYPH UR QL: NEGATIVE
TRICYCLICS UR QL SCN: NEGATIVE

## 2023-06-13 PROCEDURE — 99214 OFFICE O/P EST MOD 30 MIN: CPT | Performed by: NURSE PRACTITIONER

## 2023-06-13 PROCEDURE — 80306 DRUG TEST PRSMV INSTRMNT: CPT

## 2023-06-13 RX ORDER — DEXTROAMPHETAMINE SACCHARATE, AMPHETAMINE ASPARTATE, DEXTROAMPHETAMINE SULFATE AND AMPHETAMINE SULFATE 2.5; 2.5; 2.5; 2.5 MG/1; MG/1; MG/1; MG/1
10 TABLET ORAL DAILY
Qty: 30 TABLET | Refills: 0 | Status: SHIPPED | OUTPATIENT
Start: 2023-06-13 | End: 2024-06-12

## 2023-07-26 ENCOUNTER — TELEPHONE (OUTPATIENT)
Dept: PSYCHIATRY | Facility: CLINIC | Age: 28
End: 2023-07-26

## 2023-07-26 NOTE — TELEPHONE ENCOUNTER
Called and stated that the Adderall XR is not working well for her. States it is causing her to stay awake at night and making her unable to sleep. Would like to change back to Adderall IR. Please advise.

## 2023-08-01 ENCOUNTER — OFFICE VISIT (OUTPATIENT)
Dept: PSYCHIATRY | Facility: CLINIC | Age: 28
End: 2023-08-01
Payer: COMMERCIAL

## 2023-08-01 VITALS
HEIGHT: 61 IN | BODY MASS INDEX: 25.3 KG/M2 | DIASTOLIC BLOOD PRESSURE: 70 MMHG | WEIGHT: 134 LBS | HEART RATE: 98 BPM | SYSTOLIC BLOOD PRESSURE: 104 MMHG

## 2023-08-01 DIAGNOSIS — F33.0 MILD RECURRENT MAJOR DEPRESSION: ICD-10-CM

## 2023-08-01 DIAGNOSIS — F41.1 GAD (GENERALIZED ANXIETY DISORDER): Chronic | ICD-10-CM

## 2023-08-01 DIAGNOSIS — F90.2 ADHD (ATTENTION DEFICIT HYPERACTIVITY DISORDER), COMBINED TYPE: Primary | Chronic | ICD-10-CM

## 2023-08-01 PROCEDURE — 99214 OFFICE O/P EST MOD 30 MIN: CPT | Performed by: NURSE PRACTITIONER

## 2023-08-01 RX ORDER — DEXTROAMPHETAMINE SACCHARATE, AMPHETAMINE ASPARTATE, DEXTROAMPHETAMINE SULFATE AND AMPHETAMINE SULFATE 2.5; 2.5; 2.5; 2.5 MG/1; MG/1; MG/1; MG/1
10 TABLET ORAL DAILY
Qty: 30 TABLET | Refills: 0 | Status: SHIPPED | OUTPATIENT
Start: 2023-08-01

## 2023-08-29 DIAGNOSIS — F90.2 ADHD (ATTENTION DEFICIT HYPERACTIVITY DISORDER), COMBINED TYPE: Chronic | ICD-10-CM

## 2023-08-29 DIAGNOSIS — F33.1 MODERATE RECURRENT MAJOR DEPRESSION: Chronic | ICD-10-CM

## 2023-08-29 DIAGNOSIS — F41.1 GAD (GENERALIZED ANXIETY DISORDER): Chronic | ICD-10-CM

## 2023-08-29 RX ORDER — ESCITALOPRAM OXALATE 10 MG/1
10 TABLET ORAL DAILY
Qty: 30 TABLET | Refills: 2 | Status: SHIPPED | OUTPATIENT
Start: 2023-08-29 | End: 2024-08-28

## 2023-08-29 RX ORDER — DEXTROAMPHETAMINE SACCHARATE, AMPHETAMINE ASPARTATE, DEXTROAMPHETAMINE SULFATE AND AMPHETAMINE SULFATE 2.5; 2.5; 2.5; 2.5 MG/1; MG/1; MG/1; MG/1
10 TABLET ORAL DAILY
Qty: 30 TABLET | Refills: 0 | Status: SHIPPED | OUTPATIENT
Start: 2023-08-29

## 2023-08-29 NOTE — TELEPHONE ENCOUNTER
Rx Refill Note  Requested Prescriptions     Pending Prescriptions Disp Refills    amphetamine-dextroamphetamine (ADDERALL) 10 MG tablet 30 tablet 0     Sig: Take 1 tablet by mouth Daily.    escitalopram (Lexapro) 10 MG tablet 30 tablet 2     Sig: Take 1 tablet by mouth Daily.      Last office visit with prescribing clinician: 8/1/2023   Last telemedicine visit with prescribing clinician: 6/13/2023   Next office visit with prescribing clinician: 10/10/2023                         Would you like a call back once the refill request has been completed: [] Yes [] No    If the office needs to give you a call back, can they leave a voicemail: [] Yes [] No    Leo Tran MA  08/29/23, 09:25 EDT

## 2023-09-27 ENCOUNTER — OFFICE VISIT (OUTPATIENT)
Dept: FAMILY MEDICINE CLINIC | Facility: CLINIC | Age: 28
End: 2023-09-27
Payer: COMMERCIAL

## 2023-09-27 VITALS
WEIGHT: 134 LBS | HEIGHT: 62 IN | BODY MASS INDEX: 24.66 KG/M2 | DIASTOLIC BLOOD PRESSURE: 80 MMHG | SYSTOLIC BLOOD PRESSURE: 100 MMHG

## 2023-09-27 DIAGNOSIS — R53.83 FATIGUE, UNSPECIFIED TYPE: Primary | ICD-10-CM

## 2023-09-27 DIAGNOSIS — M54.50 CHRONIC BILATERAL LOW BACK PAIN WITHOUT SCIATICA: ICD-10-CM

## 2023-09-27 DIAGNOSIS — R42 DIZZINESS: ICD-10-CM

## 2023-09-27 DIAGNOSIS — Z30.011 ORAL CONTRACEPTION INITIAL PRESCRIPTION: ICD-10-CM

## 2023-09-27 DIAGNOSIS — G89.29 CHRONIC BILATERAL LOW BACK PAIN WITHOUT SCIATICA: ICD-10-CM

## 2023-09-27 DIAGNOSIS — L98.7 EXCESS SKIN OF ABDOMINAL WALL: ICD-10-CM

## 2023-09-27 PROCEDURE — 99214 OFFICE O/P EST MOD 30 MIN: CPT | Performed by: NURSE PRACTITIONER

## 2023-09-27 NOTE — PROGRESS NOTES
"      Subjective     Chief Complaint:    Chief Complaint   Patient presents with   • Weight Loss     Pt wants to discuss referral for skin surgery       History of Present Illness:   Dizziness upon standing   Fatigue  Weight loss surgery a few years ago, does not eat very well   3 years out from weight loss surgery, she lost 155, has a lot of excess skin, mostly out around her pubis area, has low back pain as a result from stretching skin, she has to wear a shaper type device, she has seen chiro without improvement in her back, she has maintained her weight loss for 1.5 years. Her back pain causes her trouble putting on pants, bending, doing dishes, standing for longer 5 minutes, household chores      Review of Systems  Gen- No fevers, chills  CV- No chest pain, palpitations  Resp- No cough, dyspnea  GI- No N/V/D, abd pain  Neuro-No dizziness, headaches      I have reviewed and/or updated the patient's past medical, surgical, family, social history and problem list as appropriate.     Medications:    Current Outpatient Medications:   •  amphetamine-dextroamphetamine (ADDERALL) 10 MG tablet, Take 1 tablet by mouth Daily., Disp: 30 tablet, Rfl: 0  •  escitalopram (Lexapro) 10 MG tablet, Take 1 tablet by mouth Daily., Disp: 30 tablet, Rfl: 2  •  omeprazole (priLOSEC) 20 MG capsule, Take 1 capsule by mouth Daily., Disp: , Rfl:     Allergies:  No Known Allergies    Objective     Vital Signs:   Vitals:    09/27/23 1643   BP: 100/80   Weight: 60.8 kg (134 lb)   Height: 156.2 cm (61.5\")     Body mass index is 24.91 kg/m².    Mild orthostatic on check     Physical Exam:    Physical Exam  Constitutional:       Appearance: Normal appearance. She is well-developed.   HENT:      Head: Normocephalic and atraumatic.      Right Ear: Tympanic membrane, ear canal and external ear normal.      Left Ear: Tympanic membrane, ear canal and external ear normal.      Nose: Nose normal.      Mouth/Throat:      Pharynx: Uvula midline.   Eyes: "      Pupils: Pupils are equal, round, and reactive to light.   Cardiovascular:      Rate and Rhythm: Normal rate and regular rhythm.      Heart sounds: Normal heart sounds. No murmur heard.    No friction rub. No gallop.   Pulmonary:      Effort: Pulmonary effort is normal.      Breath sounds: Normal breath sounds.   Abdominal:      General: Bowel sounds are normal.      Palpations: Abdomen is soft.      Tenderness: There is no abdominal tenderness.   Musculoskeletal:      Cervical back: Neck supple.   Lymphadenopathy:      Head:      Right side of head: No submental, submandibular, tonsillar, preauricular or posterior auricular adenopathy.      Left side of head: No submental, submandibular, tonsillar, preauricular or posterior auricular adenopathy.      Cervical: No cervical adenopathy.   Skin:     General: Skin is warm and dry.   Neurological:      General: No focal deficit present.      Mental Status: She is alert and oriented to person, place, and time.   Psychiatric:         Mood and Affect: Mood normal.         Behavior: Behavior normal.         Panis extends below pubis     Assessment / Plan     Assessment/Plan:   Problem List Items Addressed This Visit    None  Visit Diagnoses       Fatigue, unspecified type    -  Primary    Relevant Orders    Comprehensive Metabolic Panel    CBC Auto Differential    Vitamin D,25-Hydroxy    Vitamin B12    Folate    TSH Rfx On Abnormal To Free T4    Iron and TIBC    Ferritin    Dizziness        Chronic bilateral low back pain without sciatica        Relevant Orders    XR Spine Lumbar 2 or 3 View    Excess skin of abdominal wall        Relevant Orders    Ambulatory Referral to Plastic Surgery    Oral contraception initial prescription              -- labs  -- discussed panniculectomy I believe she meets criteria due to the amount of excess skin and her back pain from excess skin, will get her in with general surgery   -- mild orthostasis, encouraged fluid intake and  electrolytes    Discussed plan of care in detail with pt today; pt verb understanding and agrees.    Follow up:  As needed    Electronically signed by RUTHIE Camacho   09/27/2023 16:57 EDT      Please note that portions of this note were completed with a voice recognition program.

## 2023-09-28 DIAGNOSIS — F90.2 ADHD (ATTENTION DEFICIT HYPERACTIVITY DISORDER), COMBINED TYPE: Chronic | ICD-10-CM

## 2023-09-28 LAB
25(OH)D3+25(OH)D2 SERPL-MCNC: 32.3 NG/ML (ref 30–100)
ALBUMIN SERPL-MCNC: 4.6 G/DL (ref 4–5)
ALBUMIN/GLOB SERPL: 1.7 {RATIO} (ref 1.2–2.2)
ALP SERPL-CCNC: 73 IU/L (ref 44–121)
ALT SERPL-CCNC: 13 IU/L (ref 0–32)
AST SERPL-CCNC: 16 IU/L (ref 0–40)
BASOPHILS # BLD AUTO: 0 X10E3/UL (ref 0–0.2)
BASOPHILS NFR BLD AUTO: 0 %
BILIRUB SERPL-MCNC: 0.2 MG/DL (ref 0–1.2)
BUN SERPL-MCNC: 9 MG/DL (ref 6–20)
BUN/CREAT SERPL: 13 (ref 9–23)
CALCIUM SERPL-MCNC: 10 MG/DL (ref 8.7–10.2)
CHLORIDE SERPL-SCNC: 101 MMOL/L (ref 96–106)
CO2 SERPL-SCNC: 22 MMOL/L (ref 20–29)
CREAT SERPL-MCNC: 0.7 MG/DL (ref 0.57–1)
EGFRCR SERPLBLD CKD-EPI 2021: 120 ML/MIN/1.73
EOSINOPHIL # BLD AUTO: 0.1 X10E3/UL (ref 0–0.4)
EOSINOPHIL NFR BLD AUTO: 1 %
ERYTHROCYTE [DISTWIDTH] IN BLOOD BY AUTOMATED COUNT: 13.1 % (ref 11.7–15.4)
FERRITIN SERPL-MCNC: 20 NG/ML (ref 15–150)
FOLATE SERPL-MCNC: 4.3 NG/ML
GLOBULIN SER CALC-MCNC: 2.7 G/DL (ref 1.5–4.5)
GLUCOSE SERPL-MCNC: 80 MG/DL (ref 70–99)
HCT VFR BLD AUTO: 39.2 % (ref 34–46.6)
HGB BLD-MCNC: 13.6 G/DL (ref 11.1–15.9)
IMM GRANULOCYTES # BLD AUTO: 0 X10E3/UL (ref 0–0.1)
IMM GRANULOCYTES NFR BLD AUTO: 0 %
IRON SATN MFR SERPL: 7 % (ref 15–55)
IRON SERPL-MCNC: 35 UG/DL (ref 27–159)
LYMPHOCYTES # BLD AUTO: 2.2 X10E3/UL (ref 0.7–3.1)
LYMPHOCYTES NFR BLD AUTO: 28 %
Lab: NORMAL
MCH RBC QN AUTO: 32 PG (ref 26.6–33)
MCHC RBC AUTO-ENTMCNC: 34.7 G/DL (ref 31.5–35.7)
MCV RBC AUTO: 92 FL (ref 79–97)
MONOCYTES # BLD AUTO: 0.4 X10E3/UL (ref 0.1–0.9)
MONOCYTES NFR BLD AUTO: 5 %
NEUTROPHILS # BLD AUTO: 5.1 X10E3/UL (ref 1.4–7)
NEUTROPHILS NFR BLD AUTO: 66 %
PLATELET # BLD AUTO: 390 X10E3/UL (ref 150–450)
POTASSIUM SERPL-SCNC: 3.8 MMOL/L (ref 3.5–5.2)
PROT SERPL-MCNC: 7.3 G/DL (ref 6–8.5)
RBC # BLD AUTO: 4.25 X10E6/UL (ref 3.77–5.28)
SODIUM SERPL-SCNC: 141 MMOL/L (ref 134–144)
TIBC SERPL-MCNC: 494 UG/DL (ref 250–450)
TSH SERPL DL<=0.005 MIU/L-ACNC: 1.1 UIU/ML (ref 0.45–4.5)
UIBC SERPL-MCNC: 459 UG/DL (ref 131–425)
VIT B12 SERPL-MCNC: 435 PG/ML (ref 232–1245)
WBC # BLD AUTO: 7.8 X10E3/UL (ref 3.4–10.8)

## 2023-09-28 RX ORDER — DEXTROAMPHETAMINE SACCHARATE, AMPHETAMINE ASPARTATE, DEXTROAMPHETAMINE SULFATE AND AMPHETAMINE SULFATE 2.5; 2.5; 2.5; 2.5 MG/1; MG/1; MG/1; MG/1
10 TABLET ORAL DAILY
Qty: 30 TABLET | Refills: 0 | Status: SHIPPED | OUTPATIENT
Start: 2023-09-28

## 2023-09-28 NOTE — TELEPHONE ENCOUNTER
Rx Refill Note  Requested Prescriptions     Pending Prescriptions Disp Refills    amphetamine-dextroamphetamine (ADDERALL) 10 MG tablet 30 tablet 0     Sig: Take 1 tablet by mouth Daily.      Last office visit with prescribing clinician: 8/1/2023   Last telemedicine visit with prescribing clinician: 6/13/2023   Next office visit with prescribing clinician: 10/10/2023                         Would you like a call back once the refill request has been completed: [] Yes [] No    If the office needs to give you a call back, can they leave a voicemail: [] Yes [] No    Idalmis Samayoa CMA  09/28/23, 10:12 EDT

## 2023-10-02 NOTE — PROGRESS NOTES
Mild iron deficiency noted on labs but not anemic. I recommend vitamin patch if you are unable to tolerate your bariatric vitamins?

## 2023-10-03 NOTE — PROGRESS NOTES
"Chief Complaint  Depression, anxiety, and ADHD, combined type    Subjective          Courtney Saleh presents to [facility] by herself for a follow up and medication check.     History of Present Illness: Courtney states, \" I have been alright.\"  Courtney tells me that she relates that she has been feeling depressed.  She reports a depressed mood, lack of motivation, anhedonia, fatigue, and decrease in self-care.  She tells me she is trying to understand why she would feel depressed but is having a difficult time.  She recently bought a new home for her and her daughter.  She does attribute some of her feelings to her ex. she tells me that he has been in recovery from methamphetamines and is trying to come back into contact with her.  She reports feeling of pressure to help him because he is her child's father but she also has a desire to be independent and to be on her own.  She is tearful when she describes this situation.  She tells me her mother is worried as well.  She is having some difficulty with sleep due to her symptoms.  She also notes that her appetite has increased but attributes this somewhat to her menstrual cycle.   he reports being compliant with her medications. She is taking Lexapro and Adderall XR.  She denies any SI/HI/AVH.      Objective   Vital Signs:   /80   Pulse 82   Ht 156.2 cm (61.5\")   Wt 62.1 kg (137 lb)   BMI 25.47 kg/mý       PHQ-9 Score:   PHQ-9 Total Score: 11     SOFIA-7 Score:  SOFIA 7 Total Score: 9    Mental Status Exam:   Hygiene:   good  Cooperation:  Cooperative  Eye Contact:  Good  Psychomotor Behavior:  Restless  Affect:   Tearful  Mood: depressed  Speech:  Normal  Thought Process:  Goal directed and Linear  Thought Content:  Normal  Suicidal:  None  Homicidal:  None  Hallucinations:  None  Delusion:  None  Memory:  Intact  Orientation:  Person, Place, Time and Situation  Reliability:  good  Insight:  Good  Judgement:  " Good  Impulse Control:  Good  Physical/Medical Issues:  Yes GERD      Current Medications:   Current Outpatient Medications   Medication Sig Dispense Refill    amphetamine-dextroamphetamine (ADDERALL) 10 MG tablet Take 1 tablet by mouth Daily. 30 tablet 0    escitalopram (Lexapro) 20 MG tablet Take 1 tablet by mouth Daily. 30 tablet 2    omeprazole (priLOSEC) 20 MG capsule Take 1 capsule by mouth Daily.       No current facility-administered medications for this visit.   Physical Exam  Vitals and nursing note reviewed.   Constitutional:       Appearance: Normal appearance. She is well-developed and normal weight.   Musculoskeletal:         General: Normal range of motion.   Skin:     General: Skin is warm and dry.   Neurological:      General: No focal deficit present.      Mental Status: She is alert and oriented to person, place, and time.   Psychiatric:         Attention and Perception: Attention normal.         Mood and Affect: Mood is depressed. Affect is tearful.         Speech: Speech normal.         Behavior: Behavior normal. Hyperactive: restless. Behavior is cooperative.         Thought Content: Thought content normal.         Cognition and Memory: Cognition normal.         Judgment: Judgment normal.        The following data was reviewed by: RUTHIE Brannon on 10/10/2023:      Office Visit with Glo Benavides APRN (09/27/2023)   Assessment and Plan    Diagnoses and all orders for this visit:    1. Moderate recurrent major depression (Primary)  -     escitalopram (Lexapro) 20 MG tablet; Take 1 tablet by mouth Daily.  Dispense: 30 tablet; Refill: 2    2. SOFIA (generalized anxiety disorder)  -     escitalopram (Lexapro) 20 MG tablet; Take 1 tablet by mouth Daily.  Dispense: 30 tablet; Refill: 2    3. ADHD (attention deficit hyperactivity disorder), combined type         Impression:  -This is a follow up and medication check. Courtney reports an increase in depression and anxiety.  She  attributes some of her symptoms to the situation with her ex.  She tells me that he often puts pressure on her to help him and she would like to remain  and independent.  She is looking forward to moving into her new home but is having a difficult time being excited.  Her mother is worried.  We discussed possibly increasing Lexapro to help with symptoms of depression and anxiety but also considering attending and Miquel-anon meeting.  I explained the principles behind Miquel-anon and having group support for dealing with a person in recovery or addiction.  Provided information on a group meeting on Monday nights in [place], [place].  She may consider going to this group.  She also agrees to increasing Lexapro.  She feels a change in Adderall may be helpful as well so we agreed to split the 10 mg dose to 5 mg twice daily and assess benefits.  -Increase Lexapro to 20 mg nightly for depression and anxiety.   -Change Adderall IR to 5 mg twice daily for ADHD symptoms.  Patient has refills.  -The [facility] report, reviewed through PDMP, of the past 12 months were reviewed and is appropriate.  The patient/guardian reports taking the medication only as prescribed.  The patient/guardian denies any abuse or misuse of the medication.  The patient/guardian denies any other substance use or issues.  There are no apparent substance related issues.  The patient reports no side effects of the current medication usage.  The patient/guardian has reported significant improvement with medication usage and wishes to continue medication as prescribed.  The patient/guardian is appropriate to continue with current medication usage at this time.  Reinforced risks and side effects of medication usage, patient and/or guardian verbalize understanding in their own words and are in agreement with current plan.  -Last UDS 06/13/23.  Appropriate.      TREATMENT PLAN/GOALS: Continue supportive psychotherapy efforts and medications as indicated.  Treatment and medication options discussed during today's visit. Patient ackowledged and verbally consented to continue with current treatment plan and was educated on the importance of compliance with treatment and follow-up appointments.    MEDICATION ISSUES:    We discussed risks, benefits, and side effects of the above medications and the patient was agreeable with the plan. Patient was educated on the importance of compliance with treatment and follow-up appointments.  Patient is agreeable to call the office with any worsening of symptoms or onset of side effects. Patient is agreeable to call 911 or go to the nearest ER should he/she begin having SI/HI.      Counseled patient regarding multimodal approach with healthy nutrition, healthy sleep, regular physical activity, social activities, counseling, and medications.      Coping skills reviewed and encouraged positive framing of thoughts     Assisted patient in processing above session content; acknowledged and normalized patient's thoughts, feelings, and concerns.  Applied  positive coping skills and behavior management in session.  Allowed patient to freely discuss issues without interruption or judgment. Provided safe, confidential environment to facilitate the development of positive therapeutic relationship and encourage open, honest communication. Assisted patient in identifying risk factors which would indicate the need for higher level of care including thoughts to harm self or others and/or self-harming behavior and encouraged patient to contact this office, call 911, or present to the nearest emergency room should any of these events occur. Discussed crisis intervention services and means to access.     MEDS ORDERED DURING VISIT:  New Medications Ordered This Visit   Medications    escitalopram (Lexapro) 20 MG tablet     Sig: Take 1 tablet by mouth Daily.     Dispense:  30 tablet     Refill:  2           Follow Up   Return in about 2 months (around  12/10/2023) for Medication Check.    Patient was given instructions and counseling regarding her condition or for health maintenance advice. Please see specific information pulled into the AVS if appropriate.     This document has been electronically signed by RUTHIE Brannon  October 10, 2023 15:54 EDT      This document has been electronically signed by RUTHIE Valentin, PMHNP-BC  October 10, 2023 15:54 EDT    Part of this note may be an electronic transcription/translation of spoken language to printed text using the Dragon Dictation System.

## 2023-10-10 ENCOUNTER — OFFICE VISIT (OUTPATIENT)
Dept: PSYCHIATRY | Facility: CLINIC | Age: 28
End: 2023-10-10
Payer: COMMERCIAL

## 2023-10-10 VITALS
HEART RATE: 82 BPM | WEIGHT: 137 LBS | DIASTOLIC BLOOD PRESSURE: 80 MMHG | SYSTOLIC BLOOD PRESSURE: 120 MMHG | BODY MASS INDEX: 25.21 KG/M2 | HEIGHT: 62 IN

## 2023-10-10 DIAGNOSIS — F90.2 ADHD (ATTENTION DEFICIT HYPERACTIVITY DISORDER), COMBINED TYPE: ICD-10-CM

## 2023-10-10 DIAGNOSIS — F33.1 MODERATE RECURRENT MAJOR DEPRESSION: Primary | Chronic | ICD-10-CM

## 2023-10-10 DIAGNOSIS — F41.1 GAD (GENERALIZED ANXIETY DISORDER): Chronic | ICD-10-CM

## 2023-10-10 PROCEDURE — 99214 OFFICE O/P EST MOD 30 MIN: CPT | Performed by: NURSE PRACTITIONER

## 2023-10-10 RX ORDER — ESCITALOPRAM OXALATE 20 MG/1
20 TABLET ORAL DAILY
Qty: 30 TABLET | Refills: 2 | Status: SHIPPED | OUTPATIENT
Start: 2023-10-10 | End: 2024-10-09

## 2023-10-14 ENCOUNTER — HOSPITAL ENCOUNTER (EMERGENCY)
Facility: HOSPITAL | Age: 28
Discharge: HOME | End: 2023-10-15
Attending: EMERGENCY MEDICINE | Admitting: EMERGENCY MEDICINE
Payer: COMMERCIAL

## 2023-10-14 DIAGNOSIS — M54.6 ACUTE THORACIC BACK PAIN, UNSPECIFIED BACK PAIN LATERALITY: Primary | ICD-10-CM

## 2023-10-14 PROCEDURE — 99284 EMERGENCY DEPT VISIT MOD MDM: CPT

## 2023-10-15 ENCOUNTER — APPOINTMENT (OUTPATIENT)
Dept: CT IMAGING | Facility: HOSPITAL | Age: 28
End: 2023-10-15
Payer: COMMERCIAL

## 2023-10-15 ENCOUNTER — APPOINTMENT (OUTPATIENT)
Dept: ULTRASOUND IMAGING | Facility: HOSPITAL | Age: 28
End: 2023-10-15
Payer: COMMERCIAL

## 2023-10-15 ENCOUNTER — APPOINTMENT (OUTPATIENT)
Dept: GENERAL RADIOLOGY | Facility: HOSPITAL | Age: 28
End: 2023-10-15
Payer: COMMERCIAL

## 2023-10-15 VITALS
BODY MASS INDEX: 25.68 KG/M2 | DIASTOLIC BLOOD PRESSURE: 71 MMHG | OXYGEN SATURATION: 97 % | SYSTOLIC BLOOD PRESSURE: 107 MMHG | HEIGHT: 61 IN | WEIGHT: 136 LBS | HEART RATE: 53 BPM | RESPIRATION RATE: 18 BRPM | TEMPERATURE: 98.1 F

## 2023-10-15 VITALS
SYSTOLIC BLOOD PRESSURE: 122 MMHG | BODY MASS INDEX: 25.68 KG/M2 | HEIGHT: 61 IN | TEMPERATURE: 97.8 F | DIASTOLIC BLOOD PRESSURE: 84 MMHG | OXYGEN SATURATION: 98 % | HEART RATE: 59 BPM | RESPIRATION RATE: 16 BRPM | WEIGHT: 136 LBS

## 2023-10-15 DIAGNOSIS — R10.11 RIGHT UPPER QUADRANT PAIN: Primary | ICD-10-CM

## 2023-10-15 DIAGNOSIS — R11.2 NAUSEA AND VOMITING, UNSPECIFIED VOMITING TYPE: ICD-10-CM

## 2023-10-15 LAB
ALBUMIN SERPL-MCNC: 4.4 G/DL (ref 3.5–5.2)
ALBUMIN SERPL-MCNC: 4.6 G/DL (ref 3.5–5.2)
ALBUMIN/GLOB SERPL: 1.5 G/DL
ALBUMIN/GLOB SERPL: 1.8 G/DL
ALP SERPL-CCNC: 69 U/L (ref 39–117)
ALP SERPL-CCNC: 73 U/L (ref 39–117)
ALT SERPL W P-5'-P-CCNC: 10 U/L (ref 1–33)
ALT SERPL W P-5'-P-CCNC: 10 U/L (ref 1–33)
ANION GAP SERPL CALCULATED.3IONS-SCNC: 10.2 MMOL/L (ref 5–15)
ANION GAP SERPL CALCULATED.3IONS-SCNC: 12.6 MMOL/L (ref 5–15)
AST SERPL-CCNC: 16 U/L (ref 1–32)
AST SERPL-CCNC: 18 U/L (ref 1–32)
BACTERIA UR QL AUTO: ABNORMAL /HPF
BASOPHILS # BLD AUTO: 0.03 10*3/MM3 (ref 0–0.2)
BASOPHILS # BLD AUTO: 0.04 10*3/MM3 (ref 0–0.2)
BASOPHILS NFR BLD AUTO: 0.3 % (ref 0–1.5)
BASOPHILS NFR BLD AUTO: 0.5 % (ref 0–1.5)
BILIRUB SERPL-MCNC: 0.2 MG/DL (ref 0–1.2)
BILIRUB SERPL-MCNC: 0.5 MG/DL (ref 0–1.2)
BILIRUB UR QL STRIP: NEGATIVE
BUN SERPL-MCNC: 10 MG/DL (ref 6–20)
BUN SERPL-MCNC: 11 MG/DL (ref 6–20)
BUN/CREAT SERPL: 12.8 (ref 7–25)
BUN/CREAT SERPL: 15.3 (ref 7–25)
CALCIUM SPEC-SCNC: 9.7 MG/DL (ref 8.6–10.5)
CALCIUM SPEC-SCNC: 9.7 MG/DL (ref 8.6–10.5)
CHLORIDE SERPL-SCNC: 103 MMOL/L (ref 98–107)
CHLORIDE SERPL-SCNC: 104 MMOL/L (ref 98–107)
CLARITY UR: CLEAR
CO2 SERPL-SCNC: 23.4 MMOL/L (ref 22–29)
CO2 SERPL-SCNC: 25.8 MMOL/L (ref 22–29)
COLOR UR: YELLOW
CREAT SERPL-MCNC: 0.72 MG/DL (ref 0.57–1)
CREAT SERPL-MCNC: 0.78 MG/DL (ref 0.57–1)
D DIMER PPP FEU-MCNC: 0.27 MCGFEU/ML (ref 0–0.5)
DEPRECATED RDW RBC AUTO: 46.4 FL (ref 37–54)
DEPRECATED RDW RBC AUTO: 46.6 FL (ref 37–54)
EGFRCR SERPLBLD CKD-EPI 2021: 104.9 ML/MIN/1.73
EGFRCR SERPLBLD CKD-EPI 2021: 115.5 ML/MIN/1.73
EOSINOPHIL # BLD AUTO: 0.06 10*3/MM3 (ref 0–0.4)
EOSINOPHIL # BLD AUTO: 0.15 10*3/MM3 (ref 0–0.4)
EOSINOPHIL NFR BLD AUTO: 0.7 % (ref 0.3–6.2)
EOSINOPHIL NFR BLD AUTO: 1.7 % (ref 0.3–6.2)
ERYTHROCYTE [DISTWIDTH] IN BLOOD BY AUTOMATED COUNT: 13.7 % (ref 12.3–15.4)
ERYTHROCYTE [DISTWIDTH] IN BLOOD BY AUTOMATED COUNT: 13.9 % (ref 12.3–15.4)
GLOBULIN UR ELPH-MCNC: 2.6 GM/DL
GLOBULIN UR ELPH-MCNC: 2.9 GM/DL
GLUCOSE SERPL-MCNC: 107 MG/DL (ref 65–99)
GLUCOSE SERPL-MCNC: 95 MG/DL (ref 65–99)
GLUCOSE UR STRIP-MCNC: NEGATIVE MG/DL
HCT VFR BLD AUTO: 40.8 % (ref 34–46.6)
HCT VFR BLD AUTO: 41.3 % (ref 34–46.6)
HGB BLD-MCNC: 13.5 G/DL (ref 12–15.9)
HGB BLD-MCNC: 13.6 G/DL (ref 12–15.9)
HGB UR QL STRIP.AUTO: ABNORMAL
HOLD SPECIMEN: NORMAL
HOLD SPECIMEN: NORMAL
HYALINE CASTS UR QL AUTO: ABNORMAL /LPF
IMM GRANULOCYTES # BLD AUTO: 0.02 10*3/MM3 (ref 0–0.05)
IMM GRANULOCYTES # BLD AUTO: 0.03 10*3/MM3 (ref 0–0.05)
IMM GRANULOCYTES NFR BLD AUTO: 0.2 % (ref 0–0.5)
IMM GRANULOCYTES NFR BLD AUTO: 0.3 % (ref 0–0.5)
KETONES UR QL STRIP: NEGATIVE
LEUKOCYTE ESTERASE UR QL STRIP.AUTO: NEGATIVE
LIPASE SERPL-CCNC: 27 U/L (ref 13–60)
LYMPHOCYTES # BLD AUTO: 1.55 10*3/MM3 (ref 0.7–3.1)
LYMPHOCYTES # BLD AUTO: 2.26 10*3/MM3 (ref 0.7–3.1)
LYMPHOCYTES NFR BLD AUTO: 16.8 % (ref 19.6–45.3)
LYMPHOCYTES NFR BLD AUTO: 25.6 % (ref 19.6–45.3)
MCH RBC QN AUTO: 30 PG (ref 26.6–33)
MCH RBC QN AUTO: 30.1 PG (ref 26.6–33)
MCHC RBC AUTO-ENTMCNC: 32.9 G/DL (ref 31.5–35.7)
MCHC RBC AUTO-ENTMCNC: 33.1 G/DL (ref 31.5–35.7)
MCV RBC AUTO: 90.9 FL (ref 79–97)
MCV RBC AUTO: 91.2 FL (ref 79–97)
MONOCYTES # BLD AUTO: 0.49 10*3/MM3 (ref 0.1–0.9)
MONOCYTES # BLD AUTO: 0.49 10*3/MM3 (ref 0.1–0.9)
MONOCYTES NFR BLD AUTO: 5.3 % (ref 5–12)
MONOCYTES NFR BLD AUTO: 5.5 % (ref 5–12)
NEUTROPHILS NFR BLD AUTO: 5.87 10*3/MM3 (ref 1.7–7)
NEUTROPHILS NFR BLD AUTO: 66.5 % (ref 42.7–76)
NEUTROPHILS NFR BLD AUTO: 7.07 10*3/MM3 (ref 1.7–7)
NEUTROPHILS NFR BLD AUTO: 76.6 % (ref 42.7–76)
NITRITE UR QL STRIP: NEGATIVE
NRBC BLD AUTO-RTO: 0 /100 WBC (ref 0–0.2)
NRBC BLD AUTO-RTO: 0 /100 WBC (ref 0–0.2)
NT-PROBNP SERPL-MCNC: 57.3 PG/ML (ref 0–450)
PH UR STRIP.AUTO: 6.5 [PH] (ref 5–8)
PLATELET # BLD AUTO: 370 10*3/MM3 (ref 140–450)
PLATELET # BLD AUTO: 376 10*3/MM3 (ref 140–450)
PMV BLD AUTO: 9.2 FL (ref 6–12)
PMV BLD AUTO: 9.2 FL (ref 6–12)
POTASSIUM SERPL-SCNC: 3.9 MMOL/L (ref 3.5–5.2)
POTASSIUM SERPL-SCNC: 4.3 MMOL/L (ref 3.5–5.2)
PROT SERPL-MCNC: 7.2 G/DL (ref 6–8.5)
PROT SERPL-MCNC: 7.3 G/DL (ref 6–8.5)
PROT UR QL STRIP: NEGATIVE
RBC # BLD AUTO: 4.49 10*6/MM3 (ref 3.77–5.28)
RBC # BLD AUTO: 4.53 10*6/MM3 (ref 3.77–5.28)
RBC # UR STRIP: ABNORMAL /HPF
REF LAB TEST METHOD: ABNORMAL
SODIUM SERPL-SCNC: 139 MMOL/L (ref 136–145)
SODIUM SERPL-SCNC: 140 MMOL/L (ref 136–145)
SP GR UR STRIP: 1.01 (ref 1–1.03)
SQUAMOUS #/AREA URNS HPF: ABNORMAL /HPF
TROPONIN T SERPL HS-MCNC: <6 NG/L
UROBILINOGEN UR QL STRIP: ABNORMAL
WBC # UR STRIP: ABNORMAL /HPF
WBC NRBC COR # BLD: 8.83 10*3/MM3 (ref 3.4–10.8)
WBC NRBC COR # BLD: 9.23 10*3/MM3 (ref 3.4–10.8)
WHOLE BLOOD HOLD COAG: NORMAL
WHOLE BLOOD HOLD SPECIMEN: NORMAL

## 2023-10-15 PROCEDURE — 74176 CT ABD & PELVIS W/O CONTRAST: CPT

## 2023-10-15 PROCEDURE — 25010000002 ORPHENADRINE CITRATE PER 60 MG: Performed by: EMERGENCY MEDICINE

## 2023-10-15 PROCEDURE — 83690 ASSAY OF LIPASE: CPT

## 2023-10-15 PROCEDURE — 25010000002 ONDANSETRON PER 1 MG: Performed by: PHYSICIAN ASSISTANT

## 2023-10-15 PROCEDURE — 72070 X-RAY EXAM THORAC SPINE 2VWS: CPT

## 2023-10-15 PROCEDURE — 25010000002 ONDANSETRON PER 1 MG: Performed by: EMERGENCY MEDICINE

## 2023-10-15 PROCEDURE — 96375 TX/PRO/DX INJ NEW DRUG ADDON: CPT

## 2023-10-15 PROCEDURE — 25010000002 MORPHINE PER 10 MG: Performed by: EMERGENCY MEDICINE

## 2023-10-15 PROCEDURE — 25010000002 ERTAPENEM PER 500 MG: Performed by: PHYSICIAN ASSISTANT

## 2023-10-15 PROCEDURE — 85025 COMPLETE CBC W/AUTO DIFF WBC: CPT | Performed by: EMERGENCY MEDICINE

## 2023-10-15 PROCEDURE — 25010000002 KETOROLAC TROMETHAMINE PER 15 MG: Performed by: EMERGENCY MEDICINE

## 2023-10-15 PROCEDURE — 83880 ASSAY OF NATRIURETIC PEPTIDE: CPT | Performed by: EMERGENCY MEDICINE

## 2023-10-15 PROCEDURE — 25010000002 KETOROLAC TROMETHAMINE PER 15 MG: Performed by: PHYSICIAN ASSISTANT

## 2023-10-15 PROCEDURE — 81001 URINALYSIS AUTO W/SCOPE: CPT

## 2023-10-15 PROCEDURE — 80053 COMPREHEN METABOLIC PANEL: CPT

## 2023-10-15 PROCEDURE — 96365 THER/PROPH/DIAG IV INF INIT: CPT

## 2023-10-15 PROCEDURE — 96374 THER/PROPH/DIAG INJ IV PUSH: CPT

## 2023-10-15 PROCEDURE — 71045 X-RAY EXAM CHEST 1 VIEW: CPT

## 2023-10-15 PROCEDURE — 85025 COMPLETE CBC W/AUTO DIFF WBC: CPT

## 2023-10-15 PROCEDURE — 76705 ECHO EXAM OF ABDOMEN: CPT

## 2023-10-15 PROCEDURE — 99284 EMERGENCY DEPT VISIT MOD MDM: CPT

## 2023-10-15 PROCEDURE — 80053 COMPREHEN METABOLIC PANEL: CPT | Performed by: EMERGENCY MEDICINE

## 2023-10-15 PROCEDURE — 84484 ASSAY OF TROPONIN QUANT: CPT | Performed by: EMERGENCY MEDICINE

## 2023-10-15 PROCEDURE — 25810000003 SODIUM CHLORIDE 0.9 % SOLUTION: Performed by: PHYSICIAN ASSISTANT

## 2023-10-15 PROCEDURE — 93005 ELECTROCARDIOGRAM TRACING: CPT | Performed by: EMERGENCY MEDICINE

## 2023-10-15 PROCEDURE — 85379 FIBRIN DEGRADATION QUANT: CPT | Performed by: EMERGENCY MEDICINE

## 2023-10-15 RX ORDER — ORPHENADRINE CITRATE 30 MG/ML
60 INJECTION INTRAMUSCULAR; INTRAVENOUS ONCE
Status: COMPLETED | OUTPATIENT
Start: 2023-10-15 | End: 2023-10-15

## 2023-10-15 RX ORDER — ONDANSETRON 2 MG/ML
4 INJECTION INTRAMUSCULAR; INTRAVENOUS ONCE
Status: COMPLETED | OUTPATIENT
Start: 2023-10-15 | End: 2023-10-15

## 2023-10-15 RX ORDER — AMOXICILLIN AND CLAVULANATE POTASSIUM 875; 125 MG/1; MG/1
1 TABLET, FILM COATED ORAL 2 TIMES DAILY
Qty: 14 TABLET | Refills: 0 | Status: SHIPPED | OUTPATIENT
Start: 2023-10-15 | End: 2023-10-22

## 2023-10-15 RX ORDER — ONDANSETRON 4 MG/1
4 TABLET, ORALLY DISINTEGRATING ORAL EVERY 8 HOURS PRN
Qty: 12 TABLET | Refills: 0 | Status: SHIPPED | OUTPATIENT
Start: 2023-10-15

## 2023-10-15 RX ORDER — KETOROLAC TROMETHAMINE 30 MG/ML
15 INJECTION, SOLUTION INTRAMUSCULAR; INTRAVENOUS ONCE
Status: COMPLETED | OUTPATIENT
Start: 2023-10-15 | End: 2023-10-15

## 2023-10-15 RX ORDER — HYDROCODONE BITARTRATE AND ACETAMINOPHEN 5; 325 MG/1; MG/1
1 TABLET ORAL EVERY 6 HOURS PRN
Qty: 10 TABLET | Refills: 0 | Status: SHIPPED | OUTPATIENT
Start: 2023-10-15

## 2023-10-15 RX ORDER — FAMOTIDINE 10 MG/ML
20 INJECTION, SOLUTION INTRAVENOUS ONCE
Status: COMPLETED | OUTPATIENT
Start: 2023-10-15 | End: 2023-10-15

## 2023-10-15 RX ORDER — SODIUM CHLORIDE 0.9 % (FLUSH) 0.9 %
10 SYRINGE (ML) INJECTION AS NEEDED
Status: DISCONTINUED | OUTPATIENT
Start: 2023-10-15 | End: 2023-10-15 | Stop reason: HOSPADM

## 2023-10-15 RX ADMIN — ALUMINUM HYDROXIDE, MAGNESIUM HYDROXIDE, AND DIMETHICONE: 400; 400; 40 SUSPENSION ORAL at 09:57

## 2023-10-15 RX ADMIN — MORPHINE SULFATE 4 MG: 4 INJECTION, SOLUTION INTRAMUSCULAR; INTRAVENOUS at 12:18

## 2023-10-15 RX ADMIN — ONDANSETRON 4 MG: 2 INJECTION INTRAMUSCULAR; INTRAVENOUS at 01:25

## 2023-10-15 RX ADMIN — SODIUM CHLORIDE 1000 ML: 9 INJECTION, SOLUTION INTRAVENOUS at 09:53

## 2023-10-15 RX ADMIN — KETOROLAC TROMETHAMINE 15 MG: 30 INJECTION, SOLUTION INTRAMUSCULAR; INTRAVENOUS at 09:52

## 2023-10-15 RX ADMIN — ONDANSETRON 4 MG: 2 INJECTION INTRAMUSCULAR; INTRAVENOUS at 09:52

## 2023-10-15 RX ADMIN — KETOROLAC TROMETHAMINE 15 MG: 30 INJECTION, SOLUTION INTRAMUSCULAR; INTRAVENOUS at 01:04

## 2023-10-15 RX ADMIN — FAMOTIDINE 20 MG: 10 INJECTION INTRAVENOUS at 09:52

## 2023-10-15 RX ADMIN — ORPHENADRINE CITRATE 60 MG: 60 INJECTION INTRAMUSCULAR; INTRAVENOUS at 02:25

## 2023-10-15 RX ADMIN — ERTAPENEM SODIUM 1000 MG: 1 INJECTION, POWDER, LYOPHILIZED, FOR SOLUTION INTRAMUSCULAR; INTRAVENOUS at 11:55

## 2023-10-15 NOTE — DISCHARGE INSTRUCTIONS
Eat and drink nothing after midnight tonight.  Until then, eat a low-fat liquid diet including chicken broth, Jell-O, and fluids.  Dr. Kay, general surgeon, wants to see you tomorrow morning at 9 AM in his office.  The address and phone number are listed above.  In the meantime, return to the ER for new or worsening symptoms or acute concerns.

## 2023-10-15 NOTE — ED PROVIDER NOTES
HPI: Courtney Saleh is a 28 y.o. female who presents to the emergency department complaining of back pain.  Patient states just last 2 hours she has had severe pain across her mid back.  This is initially an achy pain but becomes very sharp when she takes a deep breath.  She feels some short of breath.  She denies any fevers, chills, cough, nausea, vomiting or other complaints.  She states that she has had symptoms like this before, came to the ER and was told she was constipated.      REVIEW OF SYSTEMS: All other systems reviewed and are negative     PAST MEDICAL HISTORY:   Past Medical History:   Diagnosis Date    ADHD (attention deficit hyperactivity disorder)     Anxiety     Asthma     Bipolar disorder     Borderline personality disorder     Bronchitis     Depression     GERD (gastroesophageal reflux disease)     History of gastric surgery     gastric sleeve    Hyperlipidemia     Migraine     Ovarian cyst     Polycystic ovarian syndrome     PTSD (post-traumatic stress disorder) 08/2022        FAMILY HISTORY:   Family History   Problem Relation Age of Onset    Arthritis Mother     Hypertension Mother     Mental illness Mother     Obesity Mother     Anxiety disorder Mother     Depression Mother     Hyperlipidemia Mother     ADD / ADHD Mother     Bipolar disorder Mother     Hyperlipidemia Father     Asthma Father     Colon cancer Maternal Grandmother     Diabetes Maternal Grandfather     Diabetes Paternal Grandfather     Dementia Paternal Grandfather         SOCIAL HISTORY:   Social History     Socioeconomic History    Marital status: Single   Tobacco Use    Smoking status: Every Day     Packs/day: 1.00     Years: 19.00     Additional pack years: 0.00     Total pack years: 19.00     Types: Cigarettes     Passive exposure: Current    Smokeless tobacco: Never   Vaping Use    Vaping Use: Some days    Substances: Nicotine, Flavoring    Devices: Pre-filled or refillable cartridge    Passive vaping exposure: Yes    Substance and Sexual Activity    Alcohol use: Yes     Comment: rarely    Drug use: Never    Sexual activity: Yes     Partners: Male     Birth control/protection: None, Bilateral salpingectomy      Comment: Emergency surgery, no fallopian tubes, one ovary.        SURGICAL HISTORY:   Past Surgical History:   Procedure Laterality Date    BARIATRIC SURGERY  9/25/2020    GASTRECTOMY  09/2020    Gastric sleeve    GASTRIC SLEEVE LAPAROSCOPIC      HERNIA REPAIR  09/2020    Hiatal hernia repair w/ sleeve surgery    MOUTH SURGERY      MOUTH SURGERY      2008    OOPHORECTOMY      2014        ALLERGIES: Patient has no known allergies.       PHYSICAL EXAM:   VITAL SIGNS:   Vitals:    10/15/23 0206   BP: 123/81   Pulse: 53   Resp:    Temp:    SpO2: 99%      CONSTITUTIONAL: Awake, well appearing, nontoxic   HENT: Atraumatic, normocephalic, oral mucosa moist, airway patent. Nares patent without drainage. External ears normal.   EYES: Conjunctivae clear, EOMI, PERRL   NECK: Trachea midline, nontender, supple   CARDIOVASCULAR: Normal heart rate, Normal rhythm.  PULMONARY/CHEST: Normal work of breathing. Clear to auscultation, no rhonchi, wheezes, or rales.  ABDOMINAL: Nondistended, soft, nontender, no rebound or guarding.  NEUROLOGIC: Nonfocal, moves all four extremities, no gross sensory or motor deficits.   EXTREMITIES: No clubbing, cyanosis, or edema   SKIN: Warm, Dry, No erythema, No rash       ED COURSE / MEDICAL DECISION MAKING:     Courtney Saleh is a 28 y.o. female who presents to the emergency department for evaluation of pleuritic back pain.  Well-developed, well-nourished young female in no distress with exam as above.  Her vital signs are normal.  Her oxygen saturation is normal on room air at 9 9%.  Will obtain labs to include D-dimer, EKG.  Will give symptomatic treatment.  Disposition pending.    Differential diagnosis includes musculoskeletal pain, dyspepsia, PE, anxiety among other etiologies.    EKG interpreted by  me: Sinus bradycardia, no acute ST/T changes, this is a normal EKG    Work appears overall reassuring.  X-rays per my interpretation reveal no obvious acute abnormality.  Patient resting comfortably.  Will discharge home with outpatient follow-up.  She is agreeable to plan of care.    Final diagnoses:   Acute thoracic back pain, unspecified back pain laterality        Jesu Sneed MD  10/15/23 5400

## 2023-10-15 NOTE — ED PROVIDER NOTES
Subjective:  Chief Complaint:  Abdominal pain    History of Present Illness:  Patient is a 28-year-old female with history of ADHD, anxiety, bipolar disorder, borderline personality disorder, hyperlipidemia, among others presenting to the ER with complaints of abdominal pain, nausea, vomiting which she states feels like acid and states it began yesterday.  She was seen yesterday for back pain and had labs that were unremarkable including negative D-dimer, troponin, CBC and CMP were unremarkable.  Chest x-ray and thoracic spine x-ray were unremarkable per radiology at that time.  Patient states that she has continued to have abdominal pain that goes all the way around to her back.  She states yesterday she was just dry heaving but today she has started vomiting.  She states that she takes Prilosec for GERD but does not take it regularly.  She denies any medications today prior to arrival.      Nurses Notes reviewed and agree, including vitals, allergies, social history and prior medical history.     REVIEW OF SYSTEMS: All systems reviewed and not pertinent unless noted.  Review of Systems   Gastrointestinal:  Positive for abdominal pain, nausea and vomiting.   All other systems reviewed and are negative.      Past Medical History:   Diagnosis Date    ADHD (attention deficit hyperactivity disorder)     Anxiety     Asthma     Bipolar disorder     Borderline personality disorder     Bronchitis     Depression     GERD (gastroesophageal reflux disease)     History of gastric surgery     gastric sleeve    Hyperlipidemia     Migraine     Ovarian cyst     Polycystic ovarian syndrome     PTSD (post-traumatic stress disorder) 08/2022       Allergies:    Patient has no known allergies.      Past Surgical History:   Procedure Laterality Date    BARIATRIC SURGERY  9/25/2020    GASTRECTOMY  09/2020    Gastric sleeve    GASTRIC SLEEVE LAPAROSCOPIC      HERNIA REPAIR  09/2020    Hiatal hernia repair w/ sleeve surgery    MOUTH  "SURGERY      MOUTH SURGERY      2008    OOPHORECTOMY      2014         Social History     Socioeconomic History    Marital status: Single   Tobacco Use    Smoking status: Every Day     Packs/day: 1.00     Years: 19.00     Additional pack years: 0.00     Total pack years: 19.00     Types: Cigarettes     Passive exposure: Current    Smokeless tobacco: Never   Vaping Use    Vaping Use: Some days    Substances: Nicotine, Flavoring    Devices: Pre-filled or refillable cartridge    Passive vaping exposure: Yes   Substance and Sexual Activity    Alcohol use: Yes     Comment: rarely    Drug use: Never    Sexual activity: Yes     Partners: Male     Birth control/protection: None, Bilateral salpingectomy      Comment: Emergency surgery, no fallopian tubes, one ovary.         Family History   Problem Relation Age of Onset    Arthritis Mother     Hypertension Mother     Mental illness Mother     Obesity Mother     Anxiety disorder Mother     Depression Mother     Hyperlipidemia Mother     ADD / ADHD Mother     Bipolar disorder Mother     Hyperlipidemia Father     Asthma Father     Colon cancer Maternal Grandmother     Diabetes Maternal Grandfather     Diabetes Paternal Grandfather     Dementia Paternal Grandfather        Objective  Physical Exam:  /71   Pulse 53   Temp 98.1 °F (36.7 °C) (Oral)   Resp 18   Ht 154.9 cm (61\")   Wt 61.7 kg (136 lb)   SpO2 97%   BMI 25.70 kg/m²      Physical Exam  Vitals and nursing note reviewed.   Constitutional:       General: She is not in acute distress.     Appearance: She is not toxic-appearing.   HENT:      Head: Normocephalic and atraumatic.   Eyes:      Extraocular Movements: Extraocular movements intact.   Cardiovascular:      Rate and Rhythm: Bradycardia present.      Heart sounds: Normal heart sounds.   Pulmonary:      Effort: Pulmonary effort is normal. No respiratory distress.   Abdominal:      General: There is no distension.      Palpations: Abdomen is soft.      " Tenderness: There is no abdominal tenderness. There is no guarding or rebound.   Skin:     General: Skin is warm and dry.   Neurological:      General: No focal deficit present.      Mental Status: She is alert and oriented to person, place, and time.   Psychiatric:         Mood and Affect: Mood normal.         Behavior: Behavior normal.         Procedures    ED Course:         Lab Results (last 24 hours)       Procedure Component Value Units Date/Time    CBC & Differential [[id number]]  (Normal) Collected: 10/15/23 0101    Specimen: Blood Updated: 10/15/23 0108    Narrative:      The following orders were created for panel order CBC & Differential.  Procedure                               Abnormality         Status                     ---------                               -----------         ------                     CBC Auto Differential[[id number]]        Normal              Final result                 Please view results for these tests on the individual orders.    Comprehensive Metabolic Panel [[id number]]  (Abnormal) Collected: 10/15/23 0101    Specimen: Blood Updated: 10/15/23 0124     Glucose 107 mg/dL      BUN 11 mg/dL      Creatinine 0.72 mg/dL      Sodium 139 mmol/L      Potassium 3.9 mmol/L      Chloride 103 mmol/L      CO2 23.4 mmol/L      Calcium 9.7 mg/dL      Total Protein 7.3 g/dL      Albumin 4.4 g/dL      ALT (SGPT) 10 U/L      AST (SGOT) 18 U/L      Alkaline Phosphatase 69 U/L      Total Bilirubin 0.2 mg/dL      Globulin 2.9 gm/dL      A/G Ratio 1.5 g/dL      BUN/Creatinine Ratio 15.3     Anion Gap 12.6 mmol/L      eGFR 115.5 mL/min/1.73     Narrative:      GFR Normal >60  Chronic Kidney Disease <60  Kidney Failure <15      BNP [[id number]]  (Normal) Collected: 10/15/23 0101    Specimen: Blood Updated: 10/15/23 0127     proBNP 57.3 pg/mL     Narrative:      This assay is used as an aid in the diagnosis of individuals suspected of having heart failure. It can be used as an aid in the diagnosis of  acute decompensated heart failure (ADHF) in patients presenting with signs and symptoms of ADHF to the emergency department (ED). In addition, NT-proBNP of <300 pg/mL indicates ADHF is not likely.    Age Range Result Interpretation  NT-proBNP Concentration (pg/mL:      <50             Positive            >450                   Gray                 300-450                    Negative             <300    50-75           Positive            >900                  Gray                300-900                  Negative            <300      >75             Positive            >1800                  Gray                300-1800                  Negative            <300    Single High Sensitivity Troponin T [[id number]]  (Normal) Collected: 10/15/23 0101    Specimen: Blood Updated: 10/15/23 0127     HS Troponin T <6 ng/L     Narrative:      High Sensitive Troponin T Reference Range:  <10.0 ng/L- Negative Female for AMI  <15.0 ng/L- Negative Male for AMI  >=10 - Abnormal Female indicating possible myocardial injury.  >=15 - Abnormal Male indicating possible myocardial injury.   Clinicians would have to utilize clinical acumen, EKG, Troponin, and serial changes to determine if it is an Acute Myocardial Infarction or myocardial injury due to an underlying chronic condition.         D-dimer, Quantitative [[id number]]  (Normal) Collected: 10/15/23 0101    Specimen: Blood Updated: 10/15/23 0140     D-Dimer, Quantitative 0.27 MCGFEU/mL     Narrative:      According to the assay 's published package insert, a normal (<0.50 MCGFEU/mL) D-dimer result in conjunction with a non-high clinical probability assessment, excludes deep vein thrombosis (DVT) and pulmonary embolism (PE) with high sensitivity.    D-dimer values increase with age and this can make VTE exclusion of an older population difficult. To address this, the [employer], based on best available evidence and recent guidelines, recommends that  "clinicians use age-adjusted D-dimer thresholds in patients greater than 50 years of age with: a) a low probability of PE who do not meet all Pulmonary Embolism Rule Out Criteria, or b) in those with intermediate probability of PE.   The formula for an age-adjusted D-dimer cut-off is \"age/100\".  For example, a 60 year old patient would have an age-adjusted cut-off of 0.60 MCGFEU/mL and an 80 year old 0.80 MCGFEU/mL.    CBC Auto Differential [[id number]]  (Normal) Collected: 10/15/23 0101    Specimen: Blood Updated: 10/15/23 0108     WBC 8.83 10*3/mm3      RBC 4.49 10*6/mm3      Hemoglobin 13.5 g/dL      Hematocrit 40.8 %      MCV 90.9 fL      MCH 30.1 pg      MCHC 33.1 g/dL      RDW 13.9 %      RDW-SD 46.6 fl      MPV 9.2 fL      Platelets 370 10*3/mm3      Neutrophil % 66.5 %      Lymphocyte % 25.6 %      Monocyte % 5.5 %      Eosinophil % 1.7 %      Basophil % 0.5 %      Immature Grans % 0.2 %      Neutrophils, Absolute 5.87 10*3/mm3      Lymphocytes, Absolute 2.26 10*3/mm3      Monocytes, Absolute 0.49 10*3/mm3      Eosinophils, Absolute 0.15 10*3/mm3      Basophils, Absolute 0.04 10*3/mm3      Immature Grans, Absolute 0.02 10*3/mm3      nRBC 0.0 /100 WBC     CBC & Differential [[id number]]  (Abnormal) Collected: 10/15/23 0931    Specimen: Blood Updated: 10/15/23 0938    Narrative:      The following orders were created for panel order CBC & Differential.  Procedure                               Abnormality         Status                     ---------                               -----------         ------                     CBC Auto Differential[[id number]]        Abnormal            Final result                 Please view results for these tests on the individual orders.    Comprehensive Metabolic Panel [[id number]] Collected: 10/15/23 0931    Specimen: Blood Updated: 10/15/23 0956     Glucose 95 mg/dL      BUN 10 mg/dL      Creatinine 0.78 mg/dL      Sodium 140 mmol/L      Potassium 4.3 mmol/L      Chloride 104 " mmol/L      CO2 25.8 mmol/L      Calcium 9.7 mg/dL      Total Protein 7.2 g/dL      Albumin 4.6 g/dL      ALT (SGPT) 10 U/L      AST (SGOT) 16 U/L      Alkaline Phosphatase 73 U/L      Total Bilirubin 0.5 mg/dL      Globulin 2.6 gm/dL      A/G Ratio 1.8 g/dL      BUN/Creatinine Ratio 12.8     Anion Gap 10.2 mmol/L      eGFR 104.9 mL/min/1.73     Narrative:      GFR Normal >60  Chronic Kidney Disease <60  Kidney Failure <15      Lipase [[id number]]  (Normal) Collected: 10/15/23 0931    Specimen: Blood Updated: 10/15/23 0956     Lipase 27 U/L     CBC Auto Differential [[id number]]  (Abnormal) Collected: 10/15/23 0931    Specimen: Blood Updated: 10/15/23 0938     WBC 9.23 10*3/mm3      RBC 4.53 10*6/mm3      Hemoglobin 13.6 g/dL      Hematocrit 41.3 %      MCV 91.2 fL      MCH 30.0 pg      MCHC 32.9 g/dL      RDW 13.7 %      RDW-SD 46.4 fl      MPV 9.2 fL      Platelets 376 10*3/mm3      Neutrophil % 76.6 %      Lymphocyte % 16.8 %      Monocyte % 5.3 %      Eosinophil % 0.7 %      Basophil % 0.3 %      Immature Grans % 0.3 %      Neutrophils, Absolute 7.07 10*3/mm3      Lymphocytes, Absolute 1.55 10*3/mm3      Monocytes, Absolute 0.49 10*3/mm3      Eosinophils, Absolute 0.06 10*3/mm3      Basophils, Absolute 0.03 10*3/mm3      Immature Grans, Absolute 0.03 10*3/mm3      nRBC 0.0 /100 WBC     Urinalysis With Microscopic If Indicated (No Culture) - Urine, Clean Catch [[id number]]  (Abnormal) Collected: 10/15/23 1105    Specimen: Urine, Clean Catch Updated: 10/15/23 1113     Color, UA Yellow     Appearance, UA Clear     pH, UA 6.5     Specific Gravity, UA 1.012     Glucose, UA Negative     Ketones, UA Negative     Bilirubin, UA Negative     Blood, UA Moderate (2+)     Protein, UA Negative     Leuk Esterase, UA Negative     Nitrite, UA Negative     Urobilinogen, UA 0.2 E.U./dL    Urinalysis, Microscopic Only - Urine, Clean Catch [[id number]]  (Abnormal) Collected: 10/15/23 1105    Specimen: Urine, Clean Catch Updated:  10/15/23 1129     RBC, UA 0-2 /HPF      WBC, UA 0-2 /HPF      Bacteria, UA Trace /HPF      Squamous Epithelial Cells, UA 3-6 /HPF      Hyaline Casts, UA None Seen /LPF      Methodology Manual Light Microscopy             US Gallbladder    Result Date: 10/15/2023  PROCEDURE: US GALLBLADDER-  History: Gallbladder wall thickening.  COMPARISON: None.  FINDINGS: Multiple grayscale and color Doppler ultrasound imaging of the right upper quadrant was performed.  Liver is grossly unremarkable. Portal vein is patent. Right kidney is unremarkable. Pancreas is obscured by bowel gas shadowing.  Gallbladder wall thickening up to 6 mm with comet tail artifact at the gallbladder wall may reflect underlying adenomyomatosis of the gallbladder. Multiple shadowing gallstones. Trace pericholecystic fluid. Common bile duct is normal in caliber, measures 4 mm.      Impression:  1. Cholelithiasis with gallbladder wall thickening and trace pericholecystic fluid is nonspecific. If concern for acute cholecystitis exists, consider further evaluation with nuclear medicine HIDA scan.  2. Gallbladder wall thickening with comet tail artifact may reflect underlying adenomyomatosis.     This report was signed and finalized on 10/15/2023 10:56 AM by Yamilex Monson MD.      CT Abdomen Pelvis Without Contrast    Result Date: 10/15/2023  CT of the abdomen and pelvis without contrast  INDICATION: Abdominal pain  COMPARISON: November 25, 2019  TECHNIQUE: Helically acquired axial multidetector CT images were obtained from the lung bases through the pelvis without IV contrast. Dose reduction techniques to achieve ALARA were employed.  Findings:  Lung bases: [No focal pneumonia].  Liver: [Grossly unremarkable]  Spleen: [Unremarkable]  Gallbladder/biliary tree: Gallbladder wall thickening. [ No biliary ductal dilatation].  Pancreas: [Grossly unremarkable].  Adrenals: [Unremarkable]  Kidneys: [No stone. No hydronephrosis].  GI: Prior sleeve gastrectomy  with small sliding-type hiatal hernia. [No bowel obstruction]. Moderate colonic stool and gas. Small bowel loops are unobstructed. 1.2 cm radiodense enteric content in the sigmoid colon.  Bladder: [Unremarkable].  Reproductive structures: [Unremarkable]  Bones: [No significant osseous abnormalities are identified].  Tiny fat-containing umbilical hernia.      Impression: Gallbladder wall thickening. Correlate with right upper quadrant ultrasound.  Moderate colonic stool and gas. Otherwise nonobstructive bowel gas pattern.   CTDI: 8.8 mGy DLP:457.62 mGy.cm  This report was signed and finalized on 10/15/2023 9:55 AM by Yamilex Monson MD.      XR Chest 1 View    Result Date: 10/15/2023  PORTABLE CHEST  10/15/2023 3:34 AM  HISTORY: Chest pain protocol  COMPARISON:   None  FINDINGS: The cardiac silhouette is normal in size. The mediastinal and hilar contours are unremarkable.  The lungs are clear. There is no pneumothorax. The visualized osseous structures demonstrate no acute abnormalities. Bilateral nipple rings.      Impression: No acute cardiopulmonary process.        This report was signed and finalized on 10/15/2023 8:51 AM by Yamilex Monson MD.      XR Spine Thoracic 2 View    Result Date: 10/15/2023  PROCEDURE: XR SPINE THORACIC 2 VW-  History: Pain status post injury.  COMPARISON: None.  FINDINGS:  A 2 view exam demonstrates no acute fracture or dislocation. Mild dextroscoliosis of the thoracic spine. Vertebral body heights are normal. Vertebral endplate alignment is preserved.. No soft tissue abnormality is seen.      Impression: No acute fracture.     This report was signed and finalized on 10/15/2023 7:59 AM by Yamilex Monson MD.          MDM  Patient was evaluated in the ER for abdominal pain, nausea, vomiting since yesterday.  She is hemodynamically stable, afebrile, nontoxic-appearing on exam.  Differential diagnosis includes but is not limited to GERD, gastritis, peptic ulcer disease, among  others.  Initial plan includes CBC, CMP, lipase, urinalysis, CT of abdomen pelvis without contrast, and treatment with IV fluids, Pepcid, Toradol, GI cocktail, and Zofran.    Labs unremarkable.  Urinalysis not indicative of an infectious process.  CT revealed gallbladder wall thickening and right upper quadrant ultrasound recommended.  Right upper quadrant ultrasound reveals gallbladder wall thickening, cholelithiasis, and small amount of pericholecystic fluid.  Recommended HIDA scan if further concern for acute cholecystitis.  Discussed the patient with Dr. Kay, general surgery, who gave recommendations of 1 g IV Invanz in the ER as well as a low-fat liquid diet until midnight tonight.  He recommended having the patient be n.p.o. after midnight and following up at his office at 9 AM in the morning.  He also recommended giving Augmentin.  Prescription was given for Augmentin, Norco, and Zofran.  Patient is agreeable with plan to see him at 9 AM in the morning.  She was given return precautions for new or worsening symptoms or acute concerns.    Final diagnoses:   Right upper quadrant pain   Nausea and vomiting, unspecified vomiting type          Edna Hawley, PAJONAH  10/15/23 1224

## 2023-10-16 ENCOUNTER — OFFICE VISIT (OUTPATIENT)
Dept: SURGERY | Facility: CLINIC | Age: 28
End: 2023-10-16
Payer: COMMERCIAL

## 2023-10-16 VITALS
WEIGHT: 136.8 LBS | TEMPERATURE: 97.5 F | OXYGEN SATURATION: 96 % | DIASTOLIC BLOOD PRESSURE: 80 MMHG | HEART RATE: 75 BPM | BODY MASS INDEX: 25.83 KG/M2 | HEIGHT: 61 IN | SYSTOLIC BLOOD PRESSURE: 110 MMHG

## 2023-10-16 DIAGNOSIS — R10.11 RIGHT UPPER QUADRANT ABDOMINAL PAIN: ICD-10-CM

## 2023-10-16 DIAGNOSIS — K81.9 CHOLECYSTITIS: Primary | ICD-10-CM

## 2023-10-16 PROCEDURE — 99204 OFFICE O/P NEW MOD 45 MIN: CPT | Performed by: SURGERY

## 2023-10-16 NOTE — PROGRESS NOTES
Patient: Courtney Saleh    YOB: 1995    Date: 10/16/2023    Primary Care Provider: Glo Benavides APRN    Chief Complaint   Patient presents with    Cholelithiasis       SUBJECTIVE:    History of present illness:  I saw the patient in the office today as a consultation for evaluation and treatment of right upper quadrant pain with associated nausea and vomiting.  Patient was seen in the emergency department at [facility] at which time she had a gallbladder ultrasound performed which showed cholelithiasis and gallbladder wall thickening.    Apparently the patient has had multiple attacks in the past several years of right upper quadrant midepigastric abdominal discomfort.  This last attack started on Saturday and she was seen in the emergency room yesterday.  It consist of right upper quadrant midepigastric abdominal discomfort that radiates into her back, it is associated with nausea and one episode of vomiting.  Meals definitely make this worse, antibiotics yesterday in the emergency room made it better.  She still is slightly tender in the right upper quadrant according to the patient.    She did have previous sleeve gastrectomy performed several years ago at [facility] for weight loss surgery, she did lose 150 pounds.    The following portions of the patient's history were reviewed and updated as appropriate: allergies, current medications, past family history, past medical history, past social history, past surgical history and problem list.    Review of Systems   Constitutional:  Negative for chills, fever and unexpected weight change.   HENT:  Negative for hearing loss, trouble swallowing and voice change.    Eyes:  Negative for visual disturbance.   Respiratory:  Negative for apnea, cough, chest tightness, shortness of breath and wheezing.    Cardiovascular:  Negative for chest pain, palpitations and leg swelling.   Gastrointestinal:  Positive for abdominal pain,  diarrhea, nausea and vomiting. Negative for abdominal distention, anal bleeding, blood in stool, constipation and rectal pain.   Endocrine: Negative for cold intolerance and heat intolerance.   Genitourinary:  Negative for difficulty urinating, dysuria and flank pain.   Musculoskeletal:  Negative for back pain and gait problem.   Skin:  Negative for color change, rash and wound.   Neurological:  Negative for dizziness, syncope, speech difficulty, weakness, light-headedness, numbness and headaches.   Hematological:  Negative for adenopathy. Does not bruise/bleed easily.   Psychiatric/Behavioral:  Negative for confusion. The patient is not nervous/anxious.        History:  Past Medical History:   Diagnosis Date    ADHD (attention deficit hyperactivity disorder)     Anxiety     Asthma     Bipolar disorder     Borderline personality disorder     Bronchitis     Depression     GERD (gastroesophageal reflux disease)     History of gastric surgery     gastric sleeve    Hyperlipidemia     Migraine     Ovarian cyst     Polycystic ovarian syndrome     PTSD (post-traumatic stress disorder) 08/2022       Past Surgical History:   Procedure Laterality Date    BARIATRIC SURGERY  9/25/2020    GASTRECTOMY  09/2020    Gastric sleeve    GASTRIC SLEEVE LAPAROSCOPIC      HERNIA REPAIR  09/2020    Hiatal hernia repair w/ sleeve surgery    MOUTH SURGERY      MOUTH SURGERY      2008    OOPHORECTOMY      2014       Family History   Problem Relation Age of Onset    Arthritis Mother     Hypertension Mother     Mental illness Mother     Obesity Mother     Anxiety disorder Mother     Depression Mother     Hyperlipidemia Mother     ADD / ADHD Mother     Bipolar disorder Mother     Hyperlipidemia Father     Asthma Father     Colon cancer Maternal Grandmother     Diabetes Maternal Grandfather     Diabetes Paternal Grandfather     Dementia Paternal Grandfather        Social History     Tobacco Use    Smoking status: Every Day     Packs/day: 1.00      "Years: 19.00     Additional pack years: 0.00     Total pack years: 19.00     Types: Cigarettes     Passive exposure: Current    Smokeless tobacco: Never   Vaping Use    Vaping Use: Some days    Substances: Nicotine, Flavoring    Devices: Pre-filled or refillable cartridge    Passive vaping exposure: Yes   Substance Use Topics    Alcohol use: Yes     Comment: rarely    Drug use: Never       Allergies:  No Known Allergies    Medications:    Current Outpatient Medications:     amoxicillin-clavulanate (AUGMENTIN) 875-125 MG per tablet, Take 1 tablet by mouth 2 (Two) Times a Day for 7 days., Disp: 14 tablet, Rfl: 0    amphetamine-dextroamphetamine (ADDERALL) 10 MG tablet, Take 1 tablet by mouth Daily., Disp: 30 tablet, Rfl: 0    escitalopram (Lexapro) 20 MG tablet, Take 1 tablet by mouth Daily., Disp: 30 tablet, Rfl: 2    HYDROcodone-acetaminophen (NORCO) 5-325 MG per tablet, Take 1 tablet by mouth Every 6 (Six) Hours As Needed for Severe Pain., Disp: 10 tablet, Rfl: 0    omeprazole (priLOSEC) 20 MG capsule, Take 1 capsule by mouth Daily., Disp: , Rfl:     ondansetron ODT (ZOFRAN-ODT) 4 MG disintegrating tablet, Place 1 tablet on the tongue Every 8 (Eight) Hours As Needed for Nausea or Vomiting., Disp: 12 tablet, Rfl: 0  No current facility-administered medications for this visit.    OBJECTIVE:    Vital Signs:   Vitals:    10/16/23 0911   BP: 110/80   Pulse: 75   Temp: 97.5 °F (36.4 °C)   SpO2: 96%   Weight: 62.1 kg (136 lb 12.8 oz)   Height: 154.9 cm (60.98\")       Physical Exam:   General Appearance:    Alert, cooperative, in no acute distress   Head:    Normocephalic, without obvious abnormality, atraumatic   Eyes:            Lids and lashes normal, conjunctivae and sclerae normal, no   icterus, no pallor, corneas clear, PERRLA   Ears:    Ears appear intact with no abnormalities noted   Throat:   No oral lesions, no thrush, oral mucosa moist   Neck:   No adenopathy, supple, trachea midline, no thyromegaly, no   " carotid bruit, no JVD   Lungs:     Clear to auscultation,respirations regular, even and                  unlabored    Heart:    Regular rhythm and normal rate, normal S1 and S2, no            murmur   Abdomen:     no masses, no organomegaly, soft non-tender, non-distended, no guarding, there is evidence of right upper quadrant tenderness, no peritoneal signs   Extremities:   Moves all extremities well, no edema, no cyanosis, no             redness   Pulses:   Pulses palpable and equal bilaterally   Skin:   No bleeding, bruising or rash   Lymph nodes:   No palpable adenopathy   Neurologic:   Cranial nerves 2 - 12 grossly intact, sensation intact      Results Review:   I reviewed the patient's new clinical results.  I reviewed the patient's new imaging results and agree with the interpretation.  I reviewed the patient's other test results and agree with the interpretation    Review of Systems was reviewed and confirmed as accurate as documented by the MA.    ASSESSMENT/PLAN:    1. Cholecystitis    2. Right upper quadrant abdominal pain        I had a detailed and extensive discussion with the patient in the office and they understand that they need to undergo laparoscopic cholecystectomy with intraoperative cholangiography or possible open cholecystectomy. Full risks and benefits of operative versus nonoperative intervention were discussed with the patient and these included things such as nonresolution of symptoms and possible worsening of symptoms without surgical intervention versus infection, bleeding, open cholecystectomy, common bile duct injury, postoperative biliary leakage, need for drain placement, possible inability to perform cholangiography due to inflammation, postoperative abscess, etc with surgical intervention. The patient understands, agrees, and wishes to proceed with the surgical treatment plan as mentioned above. The patient had no questions for me at the end of the discussion.  I did draw a  picture of the anatomy for the patient and used this in my informed consent.     I discussed the patients findings and my recommendations with patient.    I have asked the patient to start on her oral Augmentin prescribed by the emergency room yesterday.    Electronically signed by Fernando Kay MD  10/16/23

## 2023-10-17 ENCOUNTER — OUTSIDE FACILITY SERVICE (OUTPATIENT)
Dept: SURGERY | Facility: CLINIC | Age: 28
End: 2023-10-17
Payer: COMMERCIAL

## 2023-10-17 DIAGNOSIS — K81.9 CHOLECYSTITIS: Primary | ICD-10-CM

## 2023-10-17 RX ORDER — HYDROCODONE BITARTRATE AND ACETAMINOPHEN 7.5; 325 MG/1; MG/1
1 TABLET ORAL EVERY 6 HOURS PRN
Qty: 20 TABLET | Refills: 0 | Status: SHIPPED | OUTPATIENT
Start: 2023-10-17

## 2023-10-23 ENCOUNTER — TELEPHONE (OUTPATIENT)
Dept: SURGERY | Facility: CLINIC | Age: 28
End: 2023-10-23

## 2023-10-23 NOTE — TELEPHONE ENCOUNTER
Caller: MEL FULTON    Relationship: SELF    Best call back number: [phone] (home)       What form or medical record are you requesting: SEBASTIAN LETTER    Who is requesting this form or medical record from you: EMPLOYER    How would you like to receive the form or medical records (pick-up, mail, fax):     Timeframe paperwork needed: BY EOD 10.23.23    Additional notes: PT WAS OFF FROM 10.16.23 TO 10.20.23 AND RETURNED 10.23.23

## 2023-10-24 ENCOUNTER — HOSPITAL ENCOUNTER (EMERGENCY)
Facility: HOSPITAL | Age: 28
Discharge: HOME | End: 2023-10-24
Attending: EMERGENCY MEDICINE | Admitting: EMERGENCY MEDICINE
Payer: COMMERCIAL

## 2023-10-24 ENCOUNTER — APPOINTMENT (OUTPATIENT)
Dept: GENERAL RADIOLOGY | Facility: HOSPITAL | Age: 28
End: 2023-10-24
Payer: COMMERCIAL

## 2023-10-24 ENCOUNTER — APPOINTMENT (OUTPATIENT)
Dept: CT IMAGING | Facility: HOSPITAL | Age: 28
End: 2023-10-24
Payer: COMMERCIAL

## 2023-10-24 VITALS
RESPIRATION RATE: 18 BRPM | DIASTOLIC BLOOD PRESSURE: 68 MMHG | SYSTOLIC BLOOD PRESSURE: 101 MMHG | HEIGHT: 61 IN | TEMPERATURE: 97.6 F | HEART RATE: 58 BPM | OXYGEN SATURATION: 98 % | BODY MASS INDEX: 25.49 KG/M2 | WEIGHT: 135 LBS

## 2023-10-24 DIAGNOSIS — R42 DIZZINESS: Primary | ICD-10-CM

## 2023-10-24 DIAGNOSIS — Z90.3 H/O GASTRIC SLEEVE: ICD-10-CM

## 2023-10-24 DIAGNOSIS — R11.0 NAUSEA: ICD-10-CM

## 2023-10-24 DIAGNOSIS — K59.00 CONSTIPATION, UNSPECIFIED CONSTIPATION TYPE: ICD-10-CM

## 2023-10-24 DIAGNOSIS — R10.10 PAIN OF UPPER ABDOMEN: ICD-10-CM

## 2023-10-24 DIAGNOSIS — Z90.49 S/P CHOLECYSTECTOMY: ICD-10-CM

## 2023-10-24 LAB
ALBUMIN SERPL-MCNC: 4.2 G/DL (ref 3.5–5.2)
ALBUMIN/GLOB SERPL: 1.8 G/DL
ALP SERPL-CCNC: 74 U/L (ref 39–117)
ALT SERPL W P-5'-P-CCNC: 18 U/L (ref 1–33)
ANION GAP SERPL CALCULATED.3IONS-SCNC: 9 MMOL/L (ref 5–15)
AST SERPL-CCNC: 17 U/L (ref 1–32)
BASOPHILS # BLD AUTO: 0.03 10*3/MM3 (ref 0–0.2)
BASOPHILS NFR BLD AUTO: 0.4 % (ref 0–1.5)
BILIRUB SERPL-MCNC: 0.3 MG/DL (ref 0–1.2)
BILIRUB UR QL STRIP: NEGATIVE
BUN SERPL-MCNC: 14 MG/DL (ref 6–20)
BUN/CREAT SERPL: 21.5 (ref 7–25)
CALCIUM SPEC-SCNC: 9.3 MG/DL (ref 8.6–10.5)
CHLORIDE SERPL-SCNC: 102 MMOL/L (ref 98–107)
CLARITY UR: CLEAR
CO2 SERPL-SCNC: 26 MMOL/L (ref 22–29)
COLOR UR: YELLOW
CREAT SERPL-MCNC: 0.65 MG/DL (ref 0.57–1)
D DIMER PPP FEU-MCNC: 0.48 MCGFEU/ML (ref 0–0.5)
DEPRECATED RDW RBC AUTO: 47.5 FL (ref 37–54)
EGFRCR SERPLBLD CKD-EPI 2021: 121.6 ML/MIN/1.73
EOSINOPHIL # BLD AUTO: 0.03 10*3/MM3 (ref 0–0.4)
EOSINOPHIL NFR BLD AUTO: 0.4 % (ref 0.3–6.2)
ERYTHROCYTE [DISTWIDTH] IN BLOOD BY AUTOMATED COUNT: 13.7 % (ref 12.3–15.4)
GLOBULIN UR ELPH-MCNC: 2.3 GM/DL
GLUCOSE SERPL-MCNC: 86 MG/DL (ref 65–99)
GLUCOSE UR STRIP-MCNC: NEGATIVE MG/DL
HCT VFR BLD AUTO: 40.9 % (ref 34–46.6)
HGB BLD-MCNC: 13.1 G/DL (ref 12–15.9)
HGB UR QL STRIP.AUTO: NEGATIVE
HOLD SPECIMEN: NORMAL
IMM GRANULOCYTES # BLD AUTO: 0.02 10*3/MM3 (ref 0–0.05)
IMM GRANULOCYTES NFR BLD AUTO: 0.2 % (ref 0–0.5)
KETONES UR QL STRIP: ABNORMAL
LEUKOCYTE ESTERASE UR QL STRIP.AUTO: NEGATIVE
LIPASE SERPL-CCNC: 23 U/L (ref 13–60)
LYMPHOCYTES # BLD AUTO: 1.47 10*3/MM3 (ref 0.7–3.1)
LYMPHOCYTES NFR BLD AUTO: 17.4 % (ref 19.6–45.3)
MCH RBC QN AUTO: 30.1 PG (ref 26.6–33)
MCHC RBC AUTO-ENTMCNC: 32 G/DL (ref 31.5–35.7)
MCV RBC AUTO: 94 FL (ref 79–97)
MONOCYTES # BLD AUTO: 0.46 10*3/MM3 (ref 0.1–0.9)
MONOCYTES NFR BLD AUTO: 5.5 % (ref 5–12)
NEUTROPHILS NFR BLD AUTO: 6.43 10*3/MM3 (ref 1.7–7)
NEUTROPHILS NFR BLD AUTO: 76.1 % (ref 42.7–76)
NITRITE UR QL STRIP: NEGATIVE
NRBC BLD AUTO-RTO: 0 /100 WBC (ref 0–0.2)
PH UR STRIP.AUTO: 7 [PH] (ref 5–8)
PLATELET # BLD AUTO: 337 10*3/MM3 (ref 140–450)
PMV BLD AUTO: 9.4 FL (ref 6–12)
POTASSIUM SERPL-SCNC: 4.2 MMOL/L (ref 3.5–5.2)
PROT SERPL-MCNC: 6.5 G/DL (ref 6–8.5)
PROT UR QL STRIP: NEGATIVE
QT INTERVAL: 400 MS
QTC INTERVAL: 419 MS
RBC # BLD AUTO: 4.35 10*6/MM3 (ref 3.77–5.28)
SODIUM SERPL-SCNC: 137 MMOL/L (ref 136–145)
SP GR UR STRIP: 1.06 (ref 1–1.03)
TROPONIN T SERPL HS-MCNC: <6 NG/L
UROBILINOGEN UR QL STRIP: ABNORMAL
WBC NRBC COR # BLD: 8.44 10*3/MM3 (ref 3.4–10.8)
WHOLE BLOOD HOLD COAG: NORMAL
WHOLE BLOOD HOLD SPECIMEN: NORMAL

## 2023-10-24 PROCEDURE — 85379 FIBRIN DEGRADATION QUANT: CPT | Performed by: PHYSICIAN ASSISTANT

## 2023-10-24 PROCEDURE — 25810000003 SODIUM CHLORIDE 0.9 % SOLUTION: Performed by: PHYSICIAN ASSISTANT

## 2023-10-24 PROCEDURE — 25010000002 ONDANSETRON PER 1 MG: Performed by: PHYSICIAN ASSISTANT

## 2023-10-24 PROCEDURE — 84484 ASSAY OF TROPONIN QUANT: CPT | Performed by: PHYSICIAN ASSISTANT

## 2023-10-24 PROCEDURE — 85025 COMPLETE CBC W/AUTO DIFF WBC: CPT | Performed by: EMERGENCY MEDICINE

## 2023-10-24 PROCEDURE — 71045 X-RAY EXAM CHEST 1 VIEW: CPT

## 2023-10-24 PROCEDURE — 25510000001 IOPAMIDOL 61 % SOLUTION: Performed by: EMERGENCY MEDICINE

## 2023-10-24 PROCEDURE — 99285 EMERGENCY DEPT VISIT HI MDM: CPT

## 2023-10-24 PROCEDURE — 83690 ASSAY OF LIPASE: CPT | Performed by: PHYSICIAN ASSISTANT

## 2023-10-24 PROCEDURE — 81003 URINALYSIS AUTO W/O SCOPE: CPT | Performed by: EMERGENCY MEDICINE

## 2023-10-24 PROCEDURE — 93005 ELECTROCARDIOGRAM TRACING: CPT | Performed by: EMERGENCY MEDICINE

## 2023-10-24 PROCEDURE — 80053 COMPREHEN METABOLIC PANEL: CPT | Performed by: EMERGENCY MEDICINE

## 2023-10-24 PROCEDURE — 96374 THER/PROPH/DIAG INJ IV PUSH: CPT

## 2023-10-24 PROCEDURE — 74177 CT ABD & PELVIS W/CONTRAST: CPT

## 2023-10-24 PROCEDURE — 93005 ELECTROCARDIOGRAM TRACING: CPT

## 2023-10-24 RX ORDER — ONDANSETRON 2 MG/ML
4 INJECTION INTRAMUSCULAR; INTRAVENOUS ONCE
Status: COMPLETED | OUTPATIENT
Start: 2023-10-24 | End: 2023-10-24

## 2023-10-24 RX ORDER — DOCUSATE SODIUM 100 MG/1
100 CAPSULE, LIQUID FILLED ORAL 2 TIMES DAILY PRN
Qty: 10 CAPSULE | Refills: 0 | Status: SHIPPED | OUTPATIENT
Start: 2023-10-24 | End: 2023-10-29

## 2023-10-24 RX ORDER — SODIUM CHLORIDE 0.9 % (FLUSH) 0.9 %
10 SYRINGE (ML) INJECTION AS NEEDED
Status: DISCONTINUED | OUTPATIENT
Start: 2023-10-24 | End: 2023-10-24 | Stop reason: HOSPADM

## 2023-10-24 RX ADMIN — SODIUM CHLORIDE 1000 ML: 9 INJECTION, SOLUTION INTRAVENOUS at 10:36

## 2023-10-24 RX ADMIN — IOPAMIDOL 85 ML: 612 INJECTION, SOLUTION INTRAVENOUS at 10:54

## 2023-10-24 RX ADMIN — ONDANSETRON 4 MG: 2 INJECTION INTRAMUSCULAR; INTRAVENOUS at 10:41

## 2023-10-24 NOTE — Clinical Note
[facility] EMERGENCY DEPARTMENT  [address]  [facility] [address]  Phone: [phone]    Courtney Saleh was seen and treated in our emergency department on 10/24/2023.  She may return to work on 10/25/2023.         Thank you for choosing [facility].    Chris Hatch MD

## 2023-10-24 NOTE — ED PROVIDER NOTES
Subjective   History of Present Illness  Pt is a 29 yo female presenting to ED with complaints of dizziness and abdominal pain. PMHx significant for HLD, Gastric Sleeve, PCOS, Migraines, PTSD, Anxiety, Depression, Bipolar and ADHD. Pt with hx of cholecystectomy 10-17-23 with Dr. Kay. She reports worsening abdominal pain and constipation since surgery. She has had x1 small hard bowel movement yesterday in the past week. She has had nausea, chills and lightheadedness. She denies syncope, CP, SOB, cough, fever or urinary sx. She took a fiber pill yesterday. Her prior abdominal surgical hx includes gastric sleeve, oophorectomy and cholecystectomy. She uses tobacco but denies ETOH or drug use.     History provided by:  Patient and medical records      Review of Systems   Constitutional:  Positive for chills. Negative for fever.   HENT:  Negative for congestion.    Eyes:  Negative for visual disturbance.   Respiratory:  Negative for cough and shortness of breath.    Cardiovascular:  Negative for chest pain and leg swelling.   Gastrointestinal:  Positive for abdominal pain, constipation and nausea. Negative for vomiting.   Genitourinary:  Negative for difficulty urinating, dysuria and frequency.   Neurological:  Positive for light-headedness. Negative for seizures, syncope, speech difficulty, weakness and headaches.   Psychiatric/Behavioral:  Negative for confusion.        Past Medical History:   Diagnosis Date    ADHD (attention deficit hyperactivity disorder)     Anxiety     Asthma     Bipolar disorder     Borderline personality disorder     Bronchitis     Depression     GERD (gastroesophageal reflux disease)     History of gastric surgery     gastric sleeve    Hyperlipidemia     Migraine     Ovarian cyst     Polycystic ovarian syndrome     PTSD (post-traumatic stress disorder) 08/2022       No Known Allergies    Past Surgical History:   Procedure Laterality Date    BARIATRIC SURGERY  9/25/2020    GASTRECTOMY   09/2020    Gastric sleeve    GASTRIC SLEEVE LAPAROSCOPIC      HERNIA REPAIR  09/2020    Hiatal hernia repair w/ sleeve surgery    MOUTH SURGERY      MOUTH SURGERY      2008    OOPHORECTOMY      2014       Family History   Problem Relation Age of Onset    Arthritis Mother     Hypertension Mother     Mental illness Mother     Obesity Mother     Anxiety disorder Mother     Depression Mother     Hyperlipidemia Mother     ADD / ADHD Mother     Bipolar disorder Mother     Hyperlipidemia Father     Asthma Father     Colon cancer Maternal Grandmother     Diabetes Maternal Grandfather     Diabetes Paternal Grandfather     Dementia Paternal Grandfather        Social History     Socioeconomic History    Marital status: Single   Tobacco Use    Smoking status: Every Day     Packs/day: 1.00     Years: 19.00     Additional pack years: 0.00     Total pack years: 19.00     Types: Cigarettes     Passive exposure: Current    Smokeless tobacco: Never   Vaping Use    Vaping Use: Some days    Substances: Nicotine, Flavoring    Devices: Pre-filled or refillable cartridge    Passive vaping exposure: Yes   Substance and Sexual Activity    Alcohol use: Yes     Comment: rarely    Drug use: Never    Sexual activity: Yes     Partners: Male     Birth control/protection: None, Bilateral salpingectomy      Comment: Emergency surgery, no fallopian tubes, one ovary.           Objective   Physical Exam  Vitals and nursing note reviewed.   Constitutional:       Appearance: She is well-developed.   HENT:      Head: Atraumatic.      Nose: Nose normal.   Eyes:      General: Lids are normal.      Conjunctiva/sclera: Conjunctivae normal.      Pupils: Pupils are equal, round, and reactive to light.   Cardiovascular:      Rate and Rhythm: Normal rate and regular rhythm.      Heart sounds: Normal heart sounds.   Pulmonary:      Effort: Pulmonary effort is normal.      Breath sounds: Normal breath sounds. No wheezing.   Abdominal:      General: There is no  distension.      Palpations: Abdomen is soft.      Tenderness: There is generalized no abdominal tenderness. There is no right CVA tenderness, left CVA tenderness, guarding or rebound.      Comments: Well healing surgical incisions    Musculoskeletal:         General: No tenderness. Normal range of motion.      Cervical back: Normal range of motion and neck supple.   Skin:     General: Skin is warm and dry.      Findings: No erythema or rash.   Neurological:      Mental Status: She is alert and oriented to person, place, and time.      Sensory: No sensory deficit.   Psychiatric:         Speech: Speech normal.         Behavior: Behavior normal.         Procedures           ED Course  ED Course as of 10/24/23 1700   Tue Oct 24, 2023   1115 D-Dimer, Quant: 0.48 [RT]   1115 HS Troponin T: <6 [RT]      ED Course User Index  [RT] Courtney Weeks PA      Recent Results (from the past 24 hour(s))   ECG 12 Lead ED Triage Standing Order; Weak / Dizzy / AMS    Collection Time: 10/24/23  9:34 AM   Result Value Ref Range    QT Interval 400 ms    QTC Interval 419 ms   Comprehensive Metabolic Panel    Collection Time: 10/24/23 10:22 AM    Specimen: Blood   Result Value Ref Range    Glucose 86 65 - 99 mg/dL    BUN 14 6 - 20 mg/dL    Creatinine 0.65 0.57 - 1.00 mg/dL    Sodium 137 136 - 145 mmol/L    Potassium 4.2 3.5 - 5.2 mmol/L    Chloride 102 98 - 107 mmol/L    CO2 26.0 22.0 - 29.0 mmol/L    Calcium 9.3 8.6 - 10.5 mg/dL    Total Protein 6.5 6.0 - 8.5 g/dL    Albumin 4.2 3.5 - 5.2 g/dL    ALT (SGPT) 18 1 - 33 U/L    AST (SGOT) 17 1 - 32 U/L    Alkaline Phosphatase 74 39 - 117 U/L    Total Bilirubin 0.3 0.0 - 1.2 mg/dL    Globulin 2.3 gm/dL    A/G Ratio 1.8 g/dL    BUN/Creatinine Ratio 21.5 7.0 - 25.0    Anion Gap 9.0 5.0 - 15.0 mmol/L    eGFR 121.6 >60.0 mL/min/1.73   Green Top (Gel)    Collection Time: 10/24/23 10:22 AM   Result Value Ref Range    Extra Tube Hold for add-ons.    Lavender Top    Collection Time: 10/24/23 10:22  AM   Result Value Ref Range    Extra Tube hold for add-on    Gold Top - SST    Collection Time: 10/24/23 10:22 AM   Result Value Ref Range    Extra Tube Hold for add-ons.    Gray Top    Collection Time: 10/24/23 10:22 AM   Result Value Ref Range    Extra Tube Hold for add-ons.    Light Blue Top    Collection Time: 10/24/23 10:22 AM   Result Value Ref Range    Extra Tube Hold for add-ons.    CBC Auto Differential    Collection Time: 10/24/23 10:22 AM    Specimen: Blood   Result Value Ref Range    WBC 8.44 3.40 - 10.80 10*3/mm3    RBC 4.35 3.77 - 5.28 10*6/mm3    Hemoglobin 13.1 12.0 - 15.9 g/dL    Hematocrit 40.9 34.0 - 46.6 %    MCV 94.0 79.0 - 97.0 fL    MCH 30.1 26.6 - 33.0 pg    MCHC 32.0 31.5 - 35.7 g/dL    RDW 13.7 12.3 - 15.4 %    RDW-SD 47.5 37.0 - 54.0 fl    MPV 9.4 6.0 - 12.0 fL    Platelets 337 140 - 450 10*3/mm3    Neutrophil % 76.1 (H) 42.7 - 76.0 %    Lymphocyte % 17.4 (L) 19.6 - 45.3 %    Monocyte % 5.5 5.0 - 12.0 %    Eosinophil % 0.4 0.3 - 6.2 %    Basophil % 0.4 0.0 - 1.5 %    Immature Grans % 0.2 0.0 - 0.5 %    Neutrophils, Absolute 6.43 1.70 - 7.00 10*3/mm3    Lymphocytes, Absolute 1.47 0.70 - 3.10 10*3/mm3    Monocytes, Absolute 0.46 0.10 - 0.90 10*3/mm3    Eosinophils, Absolute 0.03 0.00 - 0.40 10*3/mm3    Basophils, Absolute 0.03 0.00 - 0.20 10*3/mm3    Immature Grans, Absolute 0.02 0.00 - 0.05 10*3/mm3    nRBC 0.0 0.0 - 0.2 /100 WBC   Lipase    Collection Time: 10/24/23 10:22 AM    Specimen: Blood   Result Value Ref Range    Lipase 23 13 - 60 U/L   D-dimer, Quantitative    Collection Time: 10/24/23 10:22 AM    Specimen: Blood   Result Value Ref Range    D-Dimer, Quantitative 0.48 0.00 - 0.50 MCGFEU/mL   Single High Sensitivity Troponin T    Collection Time: 10/24/23 10:22 AM    Specimen: Blood   Result Value Ref Range    HS Troponin T <6 <10 ng/L   Urinalysis With Microscopic If Indicated (No Culture) - Urine, Clean Catch    Collection Time: 10/24/23 11:30 AM    Specimen: Urine, Clean Catch  "  Result Value Ref Range    Color, UA Yellow Yellow, Straw    Appearance, UA Clear Clear    pH, UA 7.0 5.0 - 8.0    Specific Gravity, UA 1.058 (H) 1.001 - 1.030    Glucose, UA Negative Negative    Ketones, UA Trace (A) Negative    Bilirubin, UA Negative Negative    Blood, UA Negative Negative    Protein, UA Negative Negative    Leuk Esterase, UA Negative Negative    Nitrite, UA Negative Negative    Urobilinogen, UA 0.2 E.U./dL 0.2 - 1.0 E.U./dL     Note: In addition to lab results from this visit, the labs listed above may include labs taken at another facility or during a different encounter within the last 24 hours. Please correlate lab times with ED admission and discharge times for further clarification of the services performed during this visit.    CT Abdomen Pelvis With Contrast   Final Result   Impression:   1.Status post cholecystectomy.   2.Trace edema/fluid noted within the right upper abdominal wall, possibly a small subcutaneous hematoma or seroma measuring 2.3 x 0.6 cm.   3.Moderate stool within the colon and rectum.   4.Additional findings as detailed above.      Electronically Signed: Brenden Colvin MD     10/24/2023 11:06 AM EDT     Workstation ID: [id number]      XR Chest 1 View   Final Result   Impression:   Negative.         Electronically Signed: Candi Law MD     10/24/2023 10:28 AM EDT     Workstation ID: [id number]        Vitals:    10/24/23 0928 10/24/23 1215   BP: 117/86 101/68   BP Location: Right arm    Patient Position: Sitting    Pulse: 74 58   Resp: 18    Temp: 97.6 °F (36.4 °C)    TempSrc: Oral    SpO2: 97% 98%   Weight: 61.2 kg (135 lb)    Height: 154.9 cm (61\")      Medications   ondansetron (ZOFRAN) injection 4 mg (4 mg Intravenous Given 10/24/23 1041)   sodium chloride 0.9 % bolus 1,000 mL (0 mL Intravenous Stopped 10/24/23 1244)   iopamidol (ISOVUE-300) 61 % injection 100 mL (85 mL Intravenous Given 10/24/23 1054)     ECG/EMG Results (last 24 hours)       Procedure Component " Value Units Date/Time    ECG 12 Lead ED Triage Standing Order; Weak / Dizzy / AMS [[id number]] Collected: 10/24/23 0934     Updated: 10/24/23 1105     QT Interval 400 ms      QTC Interval 419 ms     Narrative:      Test Reason : ED Triage Standing Order~  Blood Pressure :   */*   mmHG  Vent. Rate :  66 BPM     Atrial Rate :  66 BPM     P-R Int : 148 ms          QRS Dur :  80 ms      QT Int : 400 ms       P-R-T Axes :  68  50  56 degrees     QTc Int : 419 ms    Normal sinus rhythm  Possible Left atrial enlargement  Borderline ECG  No previous ECGs available  Confirmed by MD Hatch Michael ([id number]) on 10/24/2023 11:05:14 AM    Referred By: EDMD           Confirmed By: Jesu Hatch MD          ECG 12 Lead ED Triage Standing Order; Weak / Dizzy / AMS   Final Result   Test Reason : ED Triage Standing Order~   Blood Pressure :   */*   mmHG   Vent. Rate :  66 BPM     Atrial Rate :  66 BPM      P-R Int : 148 ms          QRS Dur :  80 ms       QT Int : 400 ms       P-R-T Axes :  68  50  56 degrees      QTc Int : 419 ms      Normal sinus rhythm   Possible Left atrial enlargement   Borderline ECG   No previous ECGs available   Confirmed by MD Hatch Michael ([id number]) on 10/24/2023 11:05:14 AM      Referred By: EDMD           Confirmed By: Jesu Hatch MD                                               Medical Decision Making  Pt is a 27 yo female presenting to ED with complaints of abdominal pain, nausea, constipation, and lightheadedness. Labs in ED notable for WBC 8.4, Cr 0.65, Glucose 86, K 4.2, Na 137, HS Trop 6 and Ddimer 0.48. EKG NSR. CXR no acute findings. CT abd/pelvis with constipation and trace edema / fluid within RUQ consistent with small seroma and changes s/p recent post cholecystectomy. She is feeling better in ED and eager to be discharged home. Discussed constipation tx with Colace and Mag Citrate and encouraging fluids. She will f/u with PCP and Surgeon. Went over new / worse sx to return to ED.     DDx  Post Op  complications, Constipation, SBO, Pancreatitis, Diverticulitis, PE, Pneumonia, Dehydration     Problems Addressed:  Constipation, unspecified constipation type: complicated acute illness or injury  Dizziness: complicated acute illness or injury  H/O gastric sleeve: chronic illness or injury  Nausea: complicated acute illness or injury  Pain of upper abdomen: complicated acute illness or injury  S/P cholecystectomy: complicated acute illness or injury    Amount and/or Complexity of Data Reviewed  External Data Reviewed: notes.     Details: Surgery, Outside ED, Psych   Labs: ordered. Decision-making details documented in ED Course.  Radiology: ordered. Decision-making details documented in ED Course.  ECG/medicine tests: ordered. Decision-making details documented in ED Course.    Risk  OTC drugs.  Prescription drug management.        Final diagnoses:   Dizziness   Pain of upper abdomen   Nausea   S/P cholecystectomy   H/O gastric sleeve   Constipation, unspecified constipation type       ED Disposition  ED Disposition       ED Disposition   Discharge    Condition   Stable    Comment   --               Glo Benavides, APRN  [address] [facility] [address]  [phone]          [facility] EMERGENCY DEPARTMENT  [address] [facility] [id number] [phone]        Fernando Kay MD  [address] [id number]  [phone]    Schedule an appointment as soon as possible for a visit            Medication List        New Prescriptions      docusate sodium 100 MG capsule  Commonly known as: COLACE  Take 1 capsule by mouth 2 (Two) Times a Day As Needed for Constipation for up to 5 days.     magnesium citrate solution  Take 296 mL by mouth 1 (One) Time for 1 dose.               Where to Get Your Medications        These medications were sent to [facility] [address] [place] [address] [address] [facility] [phone]  - [phone]   [address] [facility]  [facility] [address]      Phone: [phone]   docusate sodium 100 MG capsule  magnesium citrate solution            Courtney Weeks PA  10/24/23 0233

## 2023-10-31 DIAGNOSIS — F90.2 ADHD (ATTENTION DEFICIT HYPERACTIVITY DISORDER), COMBINED TYPE: Chronic | ICD-10-CM

## 2023-10-31 RX ORDER — DEXTROAMPHETAMINE SACCHARATE, AMPHETAMINE ASPARTATE, DEXTROAMPHETAMINE SULFATE AND AMPHETAMINE SULFATE 2.5; 2.5; 2.5; 2.5 MG/1; MG/1; MG/1; MG/1
10 TABLET ORAL DAILY
Qty: 30 TABLET | Refills: 0 | Status: SHIPPED | OUTPATIENT
Start: 2023-10-31

## 2023-10-31 NOTE — TELEPHONE ENCOUNTER
Rx Refill Note  Requested Prescriptions     Pending Prescriptions Disp Refills    amphetamine-dextroamphetamine (ADDERALL) 10 MG tablet 30 tablet 0     Sig: Take 1 tablet by mouth Daily.      Last office visit with prescribing clinician: 10/10/2023   Last telemedicine visit with prescribing clinician: 6/13/2023   Next office visit with prescribing clinician: 12/11/2023                         Would you like a call back once the refill request has been completed: [] Yes [] No    If the office needs to give you a call back, can they leave a voicemail: [] Yes [] No    Niraj Arias MA  10/31/23, 13:03 EDT

## 2023-10-31 NOTE — PROGRESS NOTES
"Patient: Courtney Saleh    YOB: 1995    Date: 11/01/2023    Primary Care Provider: Glo Benavides APRN    Chief Complaint   Patient presents with    Post-op Follow-up     Alex torres       History of present illness:  I saw the patient in the office today as a followup from their recent laparoscopic cholecystectomy which was performed 10/17/2023.  Pathology report showed acute on chronic cholecystitis with cholelithiasis and one benign-appearing lymph node.  Patient was seen in the emergency department at [facility] 10/25/2023 at which time she complained of \"dizziness\" and \"abdominal pain.\"  CT scan of the abdomen and pelvis was performed which showed s/p cholecystectomy , trace edema/fluid within the right upper abdominal wall and moderate stool within the colon and rectum. Patient states she has no problems.    Vital Signs:   Vitals:    11/01/23 1548   BP: 114/70   Pulse: 78   Temp: 97 °F (36.1 °C)   SpO2: 98%   Weight: 63.5 kg (140 lb)   Height: 154.9 cm (61\")       Physical Exam:   General Appearance:    Alert, cooperative, in no acute distress   Abdomen:     no masses, no organomegaly, soft non-tender, non-distended, no guarding, wounds are well healed     Assessment / Plan :    1. Post-operative state        I did discuss the situation with the patient today in the office and they have done well from their recent laparoscopic cholecystectomy.  I have released the patient back to normal activity, they understand that they need to be careful about heavy lifting.  I need to see the patient back in the office only if they are having further problems, they know to call me if they are.    She states she feels markedly better, her constipation is under control with fiber supplementation.    Electronically signed by Fernando Kay MD  11/01/23                  "

## 2023-11-01 ENCOUNTER — OFFICE VISIT (OUTPATIENT)
Dept: SURGERY | Facility: CLINIC | Age: 28
End: 2023-11-01
Payer: COMMERCIAL

## 2023-11-01 VITALS
HEART RATE: 78 BPM | OXYGEN SATURATION: 98 % | DIASTOLIC BLOOD PRESSURE: 70 MMHG | BODY MASS INDEX: 26.43 KG/M2 | TEMPERATURE: 97 F | HEIGHT: 61 IN | SYSTOLIC BLOOD PRESSURE: 114 MMHG | WEIGHT: 140 LBS

## 2023-11-01 DIAGNOSIS — Z98.890 POST-OPERATIVE STATE: Primary | ICD-10-CM

## 2023-11-01 PROCEDURE — 99024 POSTOP FOLLOW-UP VISIT: CPT | Performed by: SURGERY

## 2023-11-28 DIAGNOSIS — F90.2 ADHD (ATTENTION DEFICIT HYPERACTIVITY DISORDER), COMBINED TYPE: Chronic | ICD-10-CM

## 2023-11-28 RX ORDER — DEXTROAMPHETAMINE SACCHARATE, AMPHETAMINE ASPARTATE, DEXTROAMPHETAMINE SULFATE AND AMPHETAMINE SULFATE 2.5; 2.5; 2.5; 2.5 MG/1; MG/1; MG/1; MG/1
10 TABLET ORAL DAILY
Qty: 30 TABLET | Refills: 0 | Status: SHIPPED | OUTPATIENT
Start: 2023-11-28

## 2023-11-28 NOTE — TELEPHONE ENCOUNTER
Patient tried different dosing that her and provider discussed at last appointment she is taking adderall 10 mg and then up in the day taking adderall 5 mg would like a refill sent to [facility].
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
## 2023-12-11 ENCOUNTER — TELEMEDICINE (OUTPATIENT)
Dept: PSYCHIATRY | Facility: CLINIC | Age: 28
End: 2023-12-11
Payer: COMMERCIAL

## 2023-12-11 DIAGNOSIS — F41.1 GAD (GENERALIZED ANXIETY DISORDER): Chronic | ICD-10-CM

## 2023-12-11 DIAGNOSIS — F33.1 MODERATE RECURRENT MAJOR DEPRESSION: Chronic | ICD-10-CM

## 2023-12-11 DIAGNOSIS — F90.2 ADHD (ATTENTION DEFICIT HYPERACTIVITY DISORDER), COMBINED TYPE: Primary | Chronic | ICD-10-CM

## 2023-12-11 PROCEDURE — 99214 OFFICE O/P EST MOD 30 MIN: CPT | Performed by: NURSE PRACTITIONER

## 2023-12-26 ENCOUNTER — TELEPHONE (OUTPATIENT)
Dept: PSYCHIATRY | Facility: CLINIC | Age: 28
End: 2023-12-26
Payer: COMMERCIAL

## 2023-12-26 DIAGNOSIS — F90.2 ADHD (ATTENTION DEFICIT HYPERACTIVITY DISORDER), COMBINED TYPE: Chronic | ICD-10-CM

## 2023-12-26 RX ORDER — DEXTROAMPHETAMINE SACCHARATE, AMPHETAMINE ASPARTATE, DEXTROAMPHETAMINE SULFATE AND AMPHETAMINE SULFATE 2.5; 2.5; 2.5; 2.5 MG/1; MG/1; MG/1; MG/1
TABLET ORAL
Qty: 45 TABLET | Refills: 0 | Status: SHIPPED | OUTPATIENT
Start: 2023-12-26

## 2023-12-26 NOTE — TELEPHONE ENCOUNTER
Pt called and stated that she has increased her Adderall from 10mg to 15mg per your instructions. Would like Adderall 15mg sent in.

## 2024-01-08 ENCOUNTER — OFFICE VISIT (OUTPATIENT)
Dept: FAMILY MEDICINE CLINIC | Facility: CLINIC | Age: 29
End: 2024-01-08
Payer: COMMERCIAL

## 2024-01-08 VITALS
WEIGHT: 128 LBS | BODY MASS INDEX: 23.55 KG/M2 | SYSTOLIC BLOOD PRESSURE: 120 MMHG | DIASTOLIC BLOOD PRESSURE: 80 MMHG | HEIGHT: 62 IN

## 2024-01-08 DIAGNOSIS — J01.90 ACUTE NON-RECURRENT SINUSITIS, UNSPECIFIED LOCATION: Primary | ICD-10-CM

## 2024-01-08 DIAGNOSIS — R05.1 ACUTE COUGH: ICD-10-CM

## 2024-01-08 LAB
EXPIRATION DATE: NORMAL
FLUAV AG UPPER RESP QL IA.RAPID: NOT DETECTED
FLUBV AG UPPER RESP QL IA.RAPID: NOT DETECTED
INTERNAL CONTROL: NORMAL
Lab: NORMAL
SARS-COV-2 AG UPPER RESP QL IA.RAPID: NOT DETECTED

## 2024-01-08 PROCEDURE — 99213 OFFICE O/P EST LOW 20 MIN: CPT | Performed by: NURSE PRACTITIONER

## 2024-01-08 PROCEDURE — 87428 SARSCOV & INF VIR A&B AG IA: CPT | Performed by: NURSE PRACTITIONER

## 2024-01-08 RX ORDER — CEFDINIR 300 MG/1
300 CAPSULE ORAL 2 TIMES DAILY
Qty: 14 CAPSULE | Refills: 0 | Status: SHIPPED | OUTPATIENT
Start: 2024-01-08 | End: 2024-01-15

## 2024-01-08 NOTE — PROGRESS NOTES
"      Subjective     Chief Complaint:    Chief Complaint   Patient presents with    Cough    Sinusitis    Nasal Congestion     All Sx for 3 weeks       History of Present Illness:   Cough x 2 weeks, now with sinus congestion, drainage  Yellow/white drainage, thick  A little facial pain today   Does have hx of mild asthma, no wheezing or soa  Cough worse at night, nyquil helps some       Review of Systems  Gen- No fevers, chills  CV- No chest pain, palpitations  Resp- No cough, dyspnea  GI- No N/V/D, abd pain  Neuro-No dizziness, headaches      I have reviewed and/or updated the patient's past medical, surgical, family, social history and problem list as appropriate.     Medications:    Current Outpatient Medications:     amphetamine-dextroamphetamine (ADDERALL) 10 MG tablet, Patient will take 1 tablet in the am and 1/2 tablet in the afternoon (never later than 3 PM)., Disp: 45 tablet, Rfl: 0    escitalopram (Lexapro) 20 MG tablet, Take 1 tablet by mouth Daily., Disp: 30 tablet, Rfl: 2    omeprazole (priLOSEC) 20 MG capsule, Take 1 capsule by mouth Daily., Disp: , Rfl:     cefdinir (OMNICEF) 300 MG capsule, Take 1 capsule by mouth 2 (Two) Times a Day for 7 days., Disp: 14 capsule, Rfl: 0    Allergies:  No Known Allergies    Objective     Vital Signs:   Vitals:    01/08/24 1204   BP: 120/80   Weight: 58.1 kg (128 lb)   Height: 157.5 cm (62\")     Body mass index is 23.41 kg/m².    Physical Exam:    Physical Exam  Constitutional:       Appearance: Normal appearance. She is well-developed.   HENT:      Head: Normocephalic and atraumatic.      Right Ear: Tympanic membrane, ear canal and external ear normal. Tympanic membrane is scarred.      Left Ear: Tympanic membrane, ear canal and external ear normal.      Nose: Nose normal.      Mouth/Throat:      Pharynx: Uvula midline.   Eyes:      Pupils: Pupils are equal, round, and reactive to light.   Cardiovascular:      Rate and Rhythm: Normal rate and regular rhythm.      " Heart sounds: Normal heart sounds. No murmur heard.     No friction rub. No gallop.   Pulmonary:      Effort: Pulmonary effort is normal.      Breath sounds: Normal breath sounds.   Abdominal:      General: Bowel sounds are normal.      Palpations: Abdomen is soft.      Tenderness: There is no abdominal tenderness.   Musculoskeletal:      Cervical back: Neck supple.   Lymphadenopathy:      Head:      Right side of head: No submental, submandibular, tonsillar, preauricular or posterior auricular adenopathy.      Left side of head: No submental, submandibular, tonsillar, preauricular or posterior auricular adenopathy.      Cervical: No cervical adenopathy.   Skin:     General: Skin is warm and dry.   Neurological:      General: No focal deficit present.      Mental Status: She is alert and oriented to person, place, and time.   Psychiatric:         Mood and Affect: Mood normal.         Behavior: Behavior normal.         Assessment / Plan     Assessment/Plan:   Problem List Items Addressed This Visit    None  Visit Diagnoses       Acute non-recurrent sinusitis, unspecified location    -  Primary    Relevant Medications    cefdinir (OMNICEF) 300 MG capsule    Acute cough        Relevant Orders    POCT SARS-CoV-2 Antigen ZEINAB + Flu          -- cefdinir, nasacort, can continue nyquil     Discussed plan of care in detail with pt today; pt verb understanding and agrees.    Follow up:  As needed    Electronically signed by RUTHIE Camacho   01/08/2024 12:16 EST      Please note that portions of this note were completed with a voice recognition program.

## 2024-01-30 DIAGNOSIS — F90.2 ADHD (ATTENTION DEFICIT HYPERACTIVITY DISORDER), COMBINED TYPE: Chronic | ICD-10-CM

## 2024-01-30 RX ORDER — DEXTROAMPHETAMINE SACCHARATE, AMPHETAMINE ASPARTATE, DEXTROAMPHETAMINE SULFATE AND AMPHETAMINE SULFATE 2.5; 2.5; 2.5; 2.5 MG/1; MG/1; MG/1; MG/1
TABLET ORAL
Qty: 45 TABLET | Refills: 0 | Status: SHIPPED | OUTPATIENT
Start: 2024-01-30

## 2024-01-30 NOTE — TELEPHONE ENCOUNTER
Rx Refill Note  Requested Prescriptions     Pending Prescriptions Disp Refills    amphetamine-dextroamphetamine (ADDERALL) 10 MG tablet 45 tablet 0     Sig: Patient will take 1 tablet in the am and 1/2 tablet in the afternoon (never later than 3 PM).      Last office visit with prescribing clinician: 10/10/2023   Last telemedicine visit with prescribing clinician: 12/11/2023   Next office visit with prescribing clinician: 2/12/2024                         Would you like a call back once the refill request has been completed: [] Yes [] No    If the office needs to give you a call back, can they leave a voicemail: [] Yes [] No    Niraj Arias MA  01/30/24, 12:46 EST

## 2024-02-12 ENCOUNTER — TELEMEDICINE (OUTPATIENT)
Dept: PSYCHIATRY | Facility: CLINIC | Age: 29
End: 2024-02-12
Payer: COMMERCIAL

## 2024-02-12 DIAGNOSIS — F90.2 ADHD (ATTENTION DEFICIT HYPERACTIVITY DISORDER), COMBINED TYPE: Primary | Chronic | ICD-10-CM

## 2024-02-12 DIAGNOSIS — F33.0 MILD RECURRENT MAJOR DEPRESSION: Chronic | ICD-10-CM

## 2024-02-12 DIAGNOSIS — F41.1 GAD (GENERALIZED ANXIETY DISORDER): Chronic | ICD-10-CM

## 2024-02-12 PROCEDURE — 99214 OFFICE O/P EST MOD 30 MIN: CPT | Performed by: NURSE PRACTITIONER

## 2024-03-11 DIAGNOSIS — F90.2 ADHD (ATTENTION DEFICIT HYPERACTIVITY DISORDER), COMBINED TYPE: Chronic | ICD-10-CM

## 2024-03-11 RX ORDER — DEXTROAMPHETAMINE SACCHARATE, AMPHETAMINE ASPARTATE, DEXTROAMPHETAMINE SULFATE AND AMPHETAMINE SULFATE 2.5; 2.5; 2.5; 2.5 MG/1; MG/1; MG/1; MG/1
TABLET ORAL
Qty: 45 TABLET | Refills: 0 | Status: SHIPPED | OUTPATIENT
Start: 2024-03-11

## 2024-03-11 NOTE — TELEPHONE ENCOUNTER
Rx Refill Note  Requested Prescriptions     Pending Prescriptions Disp Refills    amphetamine-dextroamphetamine (ADDERALL) 10 MG tablet 45 tablet 0     Sig: Patient will take 1 tablet in the am and 1/2 tablet in the afternoon (never later than 3 PM).      Last office visit with prescribing clinician: 10/10/2023   Last telemedicine visit with prescribing clinician: 2/12/2024   Next office visit with prescribing clinician: 3/28/2024                         Would you like a call back once the refill request has been completed: [] Yes [] No    If the office needs to give you a call back, can they leave a voicemail: [] Yes [] No    Idalmis Samayoa, ANABEL  03/11/24, 07:54 EDT

## 2024-03-12 ENCOUNTER — OFFICE VISIT (OUTPATIENT)
Dept: FAMILY MEDICINE CLINIC | Facility: CLINIC | Age: 29
End: 2024-03-12
Payer: COMMERCIAL

## 2024-03-12 VITALS
WEIGHT: 128 LBS | BODY MASS INDEX: 23.55 KG/M2 | SYSTOLIC BLOOD PRESSURE: 115 MMHG | DIASTOLIC BLOOD PRESSURE: 80 MMHG | HEIGHT: 62 IN

## 2024-03-12 DIAGNOSIS — M54.42 CHRONIC LEFT-SIDED LOW BACK PAIN WITH LEFT-SIDED SCIATICA: Primary | ICD-10-CM

## 2024-03-12 DIAGNOSIS — G89.29 CHRONIC LEFT-SIDED LOW BACK PAIN WITH LEFT-SIDED SCIATICA: Primary | ICD-10-CM

## 2024-03-12 PROCEDURE — 99213 OFFICE O/P EST LOW 20 MIN: CPT | Performed by: NURSE PRACTITIONER

## 2024-03-12 RX ORDER — METHOCARBAMOL 750 MG/1
750 TABLET, FILM COATED ORAL 2 TIMES DAILY PRN
Qty: 30 TABLET | Refills: 0 | Status: SHIPPED | OUTPATIENT
Start: 2024-03-12

## 2024-03-12 RX ORDER — METHYLPREDNISOLONE 4 MG/1
TABLET ORAL
Qty: 21 TABLET | Refills: 0 | Status: SHIPPED | OUTPATIENT
Start: 2024-03-12

## 2024-03-12 NOTE — PROGRESS NOTES
Subjective     Chief Complaint:    Chief Complaint   Patient presents with    Sciatica     Pt sts getting worse where she can't walk, sleep or sit       History of Present Illness:   Back pain for 1 years, constant, worsening, worse on left, radiates to left leg, see below  Nothing helps   Left great toe was going numb for a few hours a day but that is not happening anymore  She will take motrin/tylenol with a little improvement  Has not had pt  No bladder or bowel incontinence           Answers submitted by the patient for this visit:  Primary Reason for Visit (Submitted on 3/6/2024)  What is the primary reason for your visit?: Back Pain  Back Pain Questionnaire (Submitted on 3/6/2024)  Chief Complaint: Back pain  Chronicity: chronic  Onset: more than 1 year ago  Frequency: constantly  Progression since onset: worsening  Pain location: gluteal, lumbar spine, sacro-iliac  Pain quality: aching, cramping, shooting, stabbing  Radiates to: left buttock, left thigh, left anterolateral lower leg  Pain - numeric: 7/10  Pain is: the same all the time  Aggravated by: bending, coughing, position, lying down, standing  Stiffness is present: all day  bowel incontinence: No  leg pain: Yes  paresthesias: Yes  perianal numbness: No  tingling: Yes  Risk factors: lack of exercise, poor posture      Review of Systems  Gen- No fevers, chills  CV- No chest pain, palpitations  Resp- No cough, dyspnea  GI- No N/V/D, abd pain  Neuro-No dizziness, headaches      I have reviewed and/or updated the patient's past medical, surgical, family, social history and problem list as appropriate.     Medications:    Current Outpatient Medications:     amphetamine-dextroamphetamine (ADDERALL) 10 MG tablet, Patient will take 1 tablet in the am and 1/2 tablet in the afternoon (never later than 3 PM)., Disp: 45 tablet, Rfl: 0    omeprazole (priLOSEC) 20 MG capsule, Take 1 capsule by mouth Daily., Disp: , Rfl:     methocarbamol (ROBAXIN) 750 MG  "tablet, Take 1 tablet by mouth 2 (Two) Times a Day As Needed for Muscle Spasms., Disp: 30 tablet, Rfl: 0    methylPREDNISolone (MEDROL) 4 MG dose pack, Take as directed on package instructions., Disp: 21 tablet, Rfl: 0    Vortioxetine HBr (TRINTELLIX) 10 MG tablet tablet, Take 1 tablet by mouth Daily With Breakfast., Disp: 30 tablet, Rfl: 2    Allergies:  No Known Allergies    Objective     Vital Signs:   Vitals:    03/12/24 1708   BP: 115/80   Weight: 58.1 kg (128 lb)   Height: 157.5 cm (62\")     Body mass index is 23.41 kg/m².    Physical Exam:    Physical Exam  Vitals and nursing note reviewed.   Constitutional:       Appearance: She is well-developed.   HENT:      Head: Normocephalic and atraumatic.   Eyes:      Pupils: Pupils are equal, round, and reactive to light.   Cardiovascular:      Rate and Rhythm: Normal rate and regular rhythm.      Heart sounds: Normal heart sounds.   Pulmonary:      Effort: Pulmonary effort is normal.      Breath sounds: Normal breath sounds.   Abdominal:      General: Bowel sounds are normal. There is no distension.      Palpations: Abdomen is soft.   Musculoskeletal:      Cervical back: Neck supple.      Lumbar back: Tenderness present. Positive left straight leg raise test.      Comments: Pain with external and internal rotation of her hip in left glute, no groin pain   Skin:     General: Skin is warm and dry.   Neurological:      General: No focal deficit present.      Mental Status: She is alert and oriented to person, place, and time.   Psychiatric:         Mood and Affect: Mood normal.         Behavior: Behavior normal.         Assessment / Plan     Assessment/Plan:   Problem List Items Addressed This Visit    None  Visit Diagnoses       Chronic left-sided low back pain with left-sided sciatica    -  Primary    Relevant Medications    methylPREDNISolone (MEDROL) 4 MG dose pack    methocarbamol (ROBAXIN) 750 MG tablet    Other Relevant Orders    XR Spine Lumbar Complete With " Flex & Ext    Ambulatory Referral to Physical Therapy Evaluate and treat              Discussed plan of care in detail with pt today; pt verb understanding and agrees.    Follow up:  As needed    Electronically signed by RUTHIE Camacho   03/12/2024 17:18 EDT      Please note that portions of this note were completed with a voice recognition program.

## 2024-03-23 NOTE — PROGRESS NOTES
"Chief Complaint  Depression, anxiety, and ADHD, combined type    Subjective          Courtney Saleh presents to [facility] by herself for a follow up and medication check.     History of Present Illness: Courtney states, \"things have not been bad.\" Courtney tells me that she had been trying different ways to take the Adderall so she will have effective coverage of ADHD symptoms while working, but not have disruptions in sleep. She was taking the 10 mg IR first thing in the morning and 5 mg IR at lunch, but she did not find it as effective any longer, so she most recently tried taking 20 mg first thing in the morning. She thought this helped, but is unsure how she will sleep, so she is still evaluating. She was not able to start Trintellix due to cost, so she is still taking Lexapro and trying to remember as much as possible. She finds herself feeling irritable in the evening time. She believe the pain associated with her sciatic nerve is affecting her sleep as well. She is using steroids and muscle relaxer to help.  She is taking Adderall IR.  She denies any SI/HI/AVH.      Objective   Vital Signs:   /74   Ht 157.5 cm (62\")   Wt 62.4 kg (137 lb 9.6 oz)   BMI 25.17 kg/m²       PHQ-9 Score:   PHQ-9 Total Score: 5     SOFIA-7 Score:  SOFIA 7 Total Score: 2    Mental Status Exam:   Hygiene:   good  Cooperation:  Cooperative  Eye Contact:  Good  Psychomotor Behavior:  Appropriate  Affect:  Appropriate  Mood: normal  Speech:  Normal  Thought Process:  Goal directed and Linear  Thought Content:  Normal  Suicidal:  None  Homicidal:  None  Hallucinations:  None  Delusion:  None  Memory:  Intact  Orientation:  Person, Place, Time and Situation  Reliability:  good  Insight:  Good  Judgement:  Good  Impulse Control:  Good  Physical/Medical Issues:  Yes GERD and sciatic nerve pain      Current Medications:   Current Outpatient Medications   Medication Sig Dispense Refill    " amphetamine-dextroamphetamine (ADDERALL) 10 MG tablet Patient will take 1 tablet in the am and 1/2 tablet in the afternoon (never later than 3 PM). 45 tablet 0    methocarbamol (ROBAXIN) 750 MG tablet Take 1 tablet by mouth 2 (Two) Times a Day As Needed for Muscle Spasms. 30 tablet 0    omeprazole (priLOSEC) 20 MG capsule Take 1 capsule by mouth Daily.      Vortioxetine HBr (TRINTELLIX) 10 MG tablet tablet Take 1 tablet by mouth Daily With Breakfast. (Patient not taking: Reported on 3/28/2024) 30 tablet 2    Vortioxetine HBr (Trintellix) 10 MG tablet tablet Take 1 tablet by mouth Daily With Breakfast. 14 tablet 0     No current facility-administered medications for this visit.   Physical Exam  Vitals and nursing note reviewed.   Constitutional:       Appearance: Normal appearance. She is well-developed and normal weight.   Musculoskeletal:         General: Normal range of motion.   Skin:     General: Skin is warm and dry.   Neurological:      General: No focal deficit present.      Mental Status: She is alert and oriented to person, place, and time.   Psychiatric:         Attention and Perception: Attention normal.         Mood and Affect: Mood and affect normal.         Speech: Speech normal.         Behavior: Behavior normal. Behavior is cooperative.         Thought Content: Thought content normal.         Cognition and Memory: Cognition normal.         Judgment: Judgment normal.        The following data was reviewed by: RUTHIE Barnnon on 03/28/2024:    Office Visit with Glo Benavides APRN (03/12/2024)     Assessment and Plan    Diagnoses and all orders for this visit:    1. ADHD (attention deficit hyperactivity disorder), combined type (Primary)  -     Compliance Drug Analysis, Ur - Urine, Clean Catch    2. Medication management  -     Compliance Drug Analysis, Ur - Urine, Clean Catch    3. SOFIA (generalized anxiety disorder)  -     Vortioxetine HBr (Trintellix) 10 MG tablet tablet; Take 1  tablet by mouth Daily With Breakfast.  Dispense: 14 tablet; Refill: 0    4. Mild recurrent major depression  -     Vortioxetine HBr (Trintellix) 10 MG tablet tablet; Take 1 tablet by mouth Daily With Breakfast.  Dispense: 14 tablet; Refill: 0               Impression:  -This is a follow up and medication check. Courtney reports a stable mood and is taking Lexapro still because the cost of Trintellix was too high with her current insurance. She is switching insurances and would like to try again in the future, so we discussed sending her home with samples and then I will resubmit the medication to her nerve insurance. She agrees. She will take 5 mg Lexapro at night to taper for two weeks and then stop and start Trintellix daily with food. She is still adjusting the dose of Adderall to help with focus, concentration, and attention. She has most recently been trying 20 mg daily. I informed her she will run out of medication sooner than expected, so she will have to notify provider how she is doing with 20 mg in the am. She agrees.  -Taper Lexapro.  -Initiate Trintellix 10 mg daily with food for depression and anxiety. I gave # 14 samples.  -Continue Adderall IR 20 mg daily for ADHD symptoms.  Patient has refills.   -The KARINE report, reviewed through PDMP, of the past 12 months were reviewed and is appropriate.  The patient/guardian reports taking the medication only as prescribed.  The patient/guardian denies any abuse or misuse of the medication.  The patient/guardian denies any other substance use or issues.  There are no apparent substance related issues.  The patient reports no side effects of the current medication usage.  The patient/guardian has reported significant improvement with medication usage and wishes to continue medication as prescribed.  The patient/guardian is appropriate to continue with current medication usage at this time.  Reinforced risks and side effects of medication usage, patient and/or  guardian verbalize understanding in their own words and are in agreement with current plan.  -Last UDS 06/13/23.  Appropriate. I will collect a UDS for compliance.       TREATMENT PLAN/GOALS: Continue supportive psychotherapy efforts and medications as indicated. Treatment and medication options discussed during today's visit. Patient ackowledged and verbally consented to continue with current treatment plan and was educated on the importance of compliance with treatment and follow-up appointments.    MEDICATION ISSUES:    We discussed risks, benefits, and side effects of the above medications and the patient was agreeable with the plan. Patient was educated on the importance of compliance with treatment and follow-up appointments.  Patient is agreeable to call the office with any worsening of symptoms or onset of side effects. Patient is agreeable to call 911 or go to the nearest ER should he/she begin having SI/HI.      Counseled patient regarding multimodal approach with healthy nutrition, healthy sleep, regular physical activity, social activities, counseling, and medications.      Coping skills reviewed and encouraged positive framing of thoughts     Assisted patient in processing above session content; acknowledged and normalized patient's thoughts, feelings, and concerns.  Applied  positive coping skills and behavior management in session.  Allowed patient to freely discuss issues without interruption or judgment. Provided safe, confidential environment to facilitate the development of positive therapeutic relationship and encourage open, honest communication. Assisted patient in identifying risk factors which would indicate the need for higher level of care including thoughts to harm self or others and/or self-harming behavior and encouraged patient to contact this office, call 911, or present to the nearest emergency room should any of these events occur. Discussed crisis intervention services and means to  access.     MEDS ORDERED DURING VISIT:  New Medications Ordered This Visit   Medications    Vortioxetine HBr (Trintellix) 10 MG tablet tablet     Sig: Take 1 tablet by mouth Daily With Breakfast.     Dispense:  14 tablet     Refill:  0     Order Specific Question:   Lot Number?     Answer:   [id number]     Order Specific Question:   Expiration Date?     Answer:   7/31/2025     Order Specific Question:   Quantity     Answer:   14           Follow Up   Return in about 2 months (around 5/28/2024) for Medication Check.    Patient was given instructions and counseling regarding her condition or for health maintenance advice. Please see specific information pulled into the AVS if appropriate.     This document has been electronically signed by RUTHIE Brannon  March 30, 2024 08:46 EDT      This document has been electronically signed by RUTHIE Valentin, PMHNP-BC  March 30, 2024 08:46 EDT    Part of this note may be an electronic transcription/translation of spoken language to printed text using the Dragon Dictation System.

## 2024-03-28 ENCOUNTER — OFFICE VISIT (OUTPATIENT)
Dept: PSYCHIATRY | Facility: CLINIC | Age: 29
End: 2024-03-28
Payer: COMMERCIAL

## 2024-03-28 VITALS
BODY MASS INDEX: 25.32 KG/M2 | SYSTOLIC BLOOD PRESSURE: 110 MMHG | DIASTOLIC BLOOD PRESSURE: 74 MMHG | HEIGHT: 62 IN | WEIGHT: 137.6 LBS

## 2024-03-28 DIAGNOSIS — Z79.899 MEDICATION MANAGEMENT: ICD-10-CM

## 2024-03-28 DIAGNOSIS — F90.2 ADHD (ATTENTION DEFICIT HYPERACTIVITY DISORDER), COMBINED TYPE: Primary | Chronic | ICD-10-CM

## 2024-03-28 DIAGNOSIS — F41.1 GAD (GENERALIZED ANXIETY DISORDER): Chronic | ICD-10-CM

## 2024-03-28 DIAGNOSIS — F33.0 MILD RECURRENT MAJOR DEPRESSION: Chronic | ICD-10-CM

## 2024-03-28 PROCEDURE — 99214 OFFICE O/P EST MOD 30 MIN: CPT | Performed by: NURSE PRACTITIONER

## 2024-04-04 ENCOUNTER — TELEPHONE (OUTPATIENT)
Dept: PSYCHIATRY | Facility: CLINIC | Age: 29
End: 2024-04-04
Payer: COMMERCIAL

## 2024-04-04 DIAGNOSIS — F90.2 ADHD (ATTENTION DEFICIT HYPERACTIVITY DISORDER), COMBINED TYPE: Chronic | ICD-10-CM

## 2024-04-04 LAB — DRUGS UR: NORMAL

## 2024-04-04 RX ORDER — DEXTROAMPHETAMINE SACCHARATE, AMPHETAMINE ASPARTATE, DEXTROAMPHETAMINE SULFATE AND AMPHETAMINE SULFATE 5; 5; 5; 5 MG/1; MG/1; MG/1; MG/1
20 TABLET ORAL DAILY
Qty: 30 TABLET | Refills: 0 | Status: SHIPPED | OUTPATIENT
Start: 2024-04-04

## 2024-04-04 NOTE — TELEPHONE ENCOUNTER
Patient states at last appointment provider was aware she was taking Adderall IR 10 mg two of them for 20 MG daily and told her to call in for a refill to get dose adjusted to IR 20 MG daily.

## 2024-04-04 NOTE — TELEPHONE ENCOUNTER
Ruiz reviewed per PDMP and is appropriate.  Notes reviewed and continuing with Adderall IR 20 mg daily as documented.  Refills sent to pharmacy on file.

## 2024-04-10 ENCOUNTER — TELEPHONE (OUTPATIENT)
Dept: PSYCHIATRY | Facility: CLINIC | Age: 29
End: 2024-04-10
Payer: COMMERCIAL

## 2024-04-10 NOTE — TELEPHONE ENCOUNTER
Called pt. Pt stated that she was unable to get Trintellix due to insurance. Has new insurance now but pharmacy still has old insurance. Stated that she is trying to get everything straightened out. Stated that she would call back when she did and let us know. Stated that the Trintellix was working really well for her. Informed pt that if she needed more samples until she was able to get script filled to just let us know. Pt voiced understanding.

## 2024-04-10 NOTE — TELEPHONE ENCOUNTER
Leo,  Can you call Courtney sometime today and check on Trintellix and see if I will need to send refills to her pharmacy? Thank you

## 2024-04-11 ENCOUNTER — TELEPHONE (OUTPATIENT)
Dept: PSYCHIATRY | Facility: CLINIC | Age: 29
End: 2024-04-11
Payer: COMMERCIAL

## 2024-04-11 NOTE — TELEPHONE ENCOUNTER
Pt called and stated that even with insurance that the Trintellix was going to cost her about 450 dollars per month. Pt mentioned having talked with [employer] about pt assistance. Downloaded a copy of pt assistance form and sent to her via e-mail. Pt will complete all sections needed and send all documentation needed. Instructed pt to call us when she needed more samples.

## 2024-04-11 NOTE — TELEPHONE ENCOUNTER
Patient should be able to use copay card and get cost down. Called [facility] [phone] gave them copay card info the cost is $350.00.

## 2024-04-15 DIAGNOSIS — F33.1 MODERATE RECURRENT MAJOR DEPRESSION: Primary | ICD-10-CM

## 2024-04-17 ENCOUNTER — TELEPHONE (OUTPATIENT)
Dept: PSYCHIATRY | Facility: CLINIC | Age: 29
End: 2024-04-17
Payer: COMMERCIAL

## 2024-04-17 DIAGNOSIS — G47.09 OTHER INSOMNIA: Primary | ICD-10-CM

## 2024-04-17 RX ORDER — TRAZODONE HYDROCHLORIDE 50 MG/1
25-50 TABLET ORAL NIGHTLY
Qty: 30 TABLET | Refills: 1 | Status: SHIPPED | OUTPATIENT
Start: 2024-04-17

## 2024-04-17 NOTE — TELEPHONE ENCOUNTER
Pt came in and signed application for Trintellix. Will fax today. Pt states that she had discussed starting Trazodone to help her with sleep. Would like to go ahead and start that if possible.

## 2024-04-18 ENCOUNTER — TELEPHONE (OUTPATIENT)
Dept: PSYCHIATRY | Facility: CLINIC | Age: 29
End: 2024-04-18
Payer: COMMERCIAL

## 2024-04-18 NOTE — TELEPHONE ENCOUNTER
Received approval letter from [employer] approving pt assistance for Trintellix until 04/17/2025. Should receive first shipment early next week. Will call and let pt know and have approval letter scanned into chart.

## 2024-04-29 ENCOUNTER — TELEPHONE (OUTPATIENT)
Dept: PSYCHIATRY | Facility: CLINIC | Age: 29
End: 2024-04-29
Payer: COMMERCIAL

## 2024-04-29 DIAGNOSIS — F90.2 ADHD (ATTENTION DEFICIT HYPERACTIVITY DISORDER), COMBINED TYPE: Primary | ICD-10-CM

## 2024-04-29 DIAGNOSIS — Z79.899 MEDICATION MANAGEMENT: ICD-10-CM

## 2024-04-29 NOTE — TELEPHONE ENCOUNTER
Niraj,    Can you please inform Courtney she will need to complete a UDS at the hospital. She has 24 hours. Thank you.

## 2024-04-29 NOTE — TELEPHONE ENCOUNTER
Called and left VM... Awaiting return call.     Pt returned a call back to me. She stated that she doesn't get off until 5pm today, but was agreeable to report to the lab once she was off from work. Pt works from home, so she stated that it wouldn't take her long to get here. Spoke with Pt @ 8:35am. Has 24hrs from that time to complete.

## 2024-05-29 ENCOUNTER — OFFICE VISIT (OUTPATIENT)
Dept: PSYCHIATRY | Facility: CLINIC | Age: 29
End: 2024-05-29
Payer: COMMERCIAL

## 2024-05-29 ENCOUNTER — TELEPHONE (OUTPATIENT)
Dept: PSYCHIATRY | Facility: CLINIC | Age: 29
End: 2024-05-29

## 2024-05-29 VITALS
WEIGHT: 144.4 LBS | HEART RATE: 84 BPM | HEIGHT: 62 IN | DIASTOLIC BLOOD PRESSURE: 86 MMHG | SYSTOLIC BLOOD PRESSURE: 126 MMHG | BODY MASS INDEX: 26.57 KG/M2

## 2024-05-29 DIAGNOSIS — F41.1 GAD (GENERALIZED ANXIETY DISORDER): Chronic | ICD-10-CM

## 2024-05-29 DIAGNOSIS — F90.2 ADHD (ATTENTION DEFICIT HYPERACTIVITY DISORDER), COMBINED TYPE: Primary | Chronic | ICD-10-CM

## 2024-05-29 DIAGNOSIS — G47.09 OTHER INSOMNIA: Chronic | ICD-10-CM

## 2024-05-29 DIAGNOSIS — F33.1 MODERATE RECURRENT MAJOR DEPRESSION: Chronic | ICD-10-CM

## 2024-05-29 RX ORDER — ATOMOXETINE 25 MG/1
25 CAPSULE ORAL 2 TIMES DAILY
Qty: 60 CAPSULE | Refills: 2 | Status: SHIPPED | OUTPATIENT
Start: 2024-05-29

## 2024-05-29 NOTE — PROGRESS NOTES
"Chief Complaint  Depression, anxiety, and ADHD, combined type    Subjective          Courtney Saleh presents to [facility] by herself for a follow up and medication check.     History of Present Illness: Courtney states, \"I am struggling.\" Courtney tells me that she elected to stop taking Adderall because she thought it was impairing her sleep. She did not want to continue being drug tested, so she declined her screen today. She reports not using in three weeks. She admits to use of a Delta-8 or Delta-9 \"gummy\" since stopping Adderall to help manage sciatica pain.  She also elected to stop Trintellix at the same time as the Adderall. Without medication, she is feeling more impulsive, having poor focus/concentration and binge-eating. She reports a 15 lbs weight gain since stopping the stimulant. She admits that this alone is affecting her mood and leading to depression. She is not sleeping without taking Trazodone. She is taking 50 mg nightly. She is not taking Adderall IR or Trintellix.  She denies any SI/HI/AVH.      Objective   Vital Signs:   /86   Pulse 84   Ht 157.5 cm (62\")   Wt 65.5 kg (144 lb 6.4 oz)   BMI 26.41 kg/m²       PHQ-9 Score:   PHQ-9 Total Score: 13     SOFIA-7 Score:  SOFIA 7 Total Score: 3    Mental Status Exam:   Hygiene:   good  Cooperation:  Cooperative  Eye Contact:  Good  Psychomotor Behavior:  Restless  Affect:  Appropriate  Mood: depressed and anxious  Speech:  Pressured and Rapid  Thought Process:  Goal directed and Linear  Thought Content:  Normal  Suicidal:  None  Homicidal:  None  Hallucinations:  None  Delusion:  None  Memory:  Intact  Orientation:  Person, Place, Time and Situation  Reliability:  good  Insight:  Good  Judgement:  Good  Impulse Control:  Good  Physical/Medical Issues:  Yes GERD and sciatic nerve pain      Current Medications:   Current Outpatient Medications   Medication Sig Dispense Refill    omeprazole (priLOSEC) 20 MG " capsule Take 1 capsule by mouth Daily.      atomoxetine (Strattera) 25 MG capsule Take 1 capsule by mouth 2 (Two) Times a Day. 60 capsule 2    traZODone (DESYREL) 50 MG tablet Take 0.5-1 tablets by mouth Every Night. 30 tablet 1    Vortioxetine HBr (Trintellix) 10 MG tablet tablet Take 1 tablet by mouth Daily With Breakfast. (Patient not taking: Reported on 5/29/2024) 28 tablet 0     No current facility-administered medications for this visit.   Physical Exam  Vitals and nursing note reviewed.   Constitutional:       Appearance: Normal appearance. She is well-developed and normal weight.   Musculoskeletal:         General: Normal range of motion.   Skin:     General: Skin is warm and dry.   Neurological:      General: No focal deficit present.      Mental Status: She is alert and oriented to person, place, and time.   Psychiatric:         Attention and Perception: Attention normal.         Mood and Affect: Mood is anxious and depressed.         Speech: Speech is rapid and pressured.         Behavior: Behavior normal. Hyperactive: restless. Behavior is cooperative.         Thought Content: Thought content normal.         Cognition and Memory: Cognition normal.         Judgment: Judgment normal.        The following data was reviewed by: RUTHIE Brannon on 03/28/2024:    Office Visit with Glo Benavides APRN (03/12/2024)     Assessment and Plan    Diagnoses and all orders for this visit:    1. ADHD (attention deficit hyperactivity disorder), combined type (Primary)  -     atomoxetine (Strattera) 25 MG capsule; Take 1 capsule by mouth 2 (Two) Times a Day.  Dispense: 60 capsule; Refill: 2    2. Other insomnia    3. SOFIA (generalized anxiety disorder)    4. Moderate recurrent major depression            Impression:  -This is a follow up and medication check. Courtney reports feeling down and worried. She stopped Adderall due to impaired sleep and there was no change without it. She still can't sleep  unless she takes Trazodone. She also stopped Trintellix. She feels down, worried, having poor impulse control, poor focus/concentration, and she is binge eating. She and I reviewed her symptoms which can be attributed to both a major depressive episode ot withdrawal from stimulant and no ADHD treatment. She has been exploring her past diagnoses with her therapist because she understands many psychiatric conditions have similar symptoms. However, she is adamant that ADHD has been affecting her since childhood. She would like to resume a non-stimulant option for ADHD. We explored Strattera and Qelbree. I explained the mechanism of action, purpose, risks, benefits, and potential adverse effects. She would like to try Strattera. She may consider resuming Trintellix as well since she has depression and anxiety. I cautioned her to ry one medication at a time.   -Stop Adderall since patient is no longer taking.   -Initiate Strattera 25 mg daily for 1-2 weeks and then increase to twice daily or 50 mg daily for ADHD.   -Resume Trintellix 10 mg daily with food for depression and anxiety. Patient has refills.   -Continue Trazodone 50 mg nightly for insomnia. Patient has refills.   -The KARINE report, reviewed through PDMP, of the past 12 months were reviewed and is appropriate.  The patient/guardian reports taking the medication only as prescribed.  The patient/guardian denies any abuse or misuse of the medication.  The patient/guardian denies any other substance use or issues.  There are no apparent substance related issues.  The patient reports no side effects of the current medication usage.  The patient/guardian has reported significant improvement with medication usage and wishes to continue medication as prescribed.  The patient/guardian is appropriate to continue with current medication usage at this time.  Reinforced risks and side effects of medication usage, patient and/or guardian verbalize understanding in their  own words and are in agreement with current plan.  -Last UDS 06/13/23.  Appropriate. Hold UDS for compliance since not using a stimulant.       TREATMENT PLAN/GOALS: Continue supportive psychotherapy efforts and medications as indicated. Treatment and medication options discussed during today's visit. Patient ackowledged and verbally consented to continue with current treatment plan and was educated on the importance of compliance with treatment and follow-up appointments.    MEDICATION ISSUES:    We discussed risks, benefits, and side effects of the above medications and the patient was agreeable with the plan. Patient was educated on the importance of compliance with treatment and follow-up appointments.  Patient is agreeable to call the office with any worsening of symptoms or onset of side effects. Patient is agreeable to call 911 or go to the nearest ER should he/she begin having SI/HI.      Counseled patient regarding multimodal approach with healthy nutrition, healthy sleep, regular physical activity, social activities, counseling, and medications.      Coping skills reviewed and encouraged positive framing of thoughts     Assisted patient in processing above session content; acknowledged and normalized patient's thoughts, feelings, and concerns.  Applied  positive coping skills and behavior management in session.  Allowed patient to freely discuss issues without interruption or judgment. Provided safe, confidential environment to facilitate the development of positive therapeutic relationship and encourage open, honest communication. Assisted patient in identifying risk factors which would indicate the need for higher level of care including thoughts to harm self or others and/or self-harming behavior and encouraged patient to contact this office, call 911, or present to the nearest emergency room should any of these events occur. Discussed crisis intervention services and means to access.     MEDS ORDERED  DURING VISIT:  New Medications Ordered This Visit   Medications    atomoxetine (Strattera) 25 MG capsule     Sig: Take 1 capsule by mouth 2 (Two) Times a Day.     Dispense:  60 capsule     Refill:  2       I spent 40 minutes caring for Courtney on this date of service. This time includes time spent by me in the following activities:preparing for the visit, obtaining and/or reviewing a separately obtained history, performing a medically appropriate examination and/or evaluation , counseling and educating the patient/family/caregiver, ordering medications, tests, or procedures, documenting information in the medical record, and care coordination      Follow Up   Return in about 6 weeks (around 7/10/2024) for Medication Check.    Patient was given instructions and counseling regarding her condition or for health maintenance advice. Please see specific information pulled into the AVS if appropriate.     This document has been electronically signed by RUTHIE Brannon  May 29, 2024 17:04 EDT      This document has been electronically signed by RUTHIE Valentin, PMHNP-BC  May 29, 2024 17:04 EDT    Part of this note may be an electronic transcription/translation of spoken language to printed text using the Dragon Dictation System.

## 2024-05-29 NOTE — TELEPHONE ENCOUNTER
Patient called in left message requesting a sooner appointment. States she stopped her Adderall due to messing with her sleep patterns. Now she is still struggling with sleep and focus. She is still taking the trazodone. Please advise    I did call Patient back and left message to call back to ask if she was still taking Trintellix and if she has anything else going on to cause this.

## 2024-06-11 ENCOUNTER — TELEPHONE (OUTPATIENT)
Dept: PSYCHIATRY | Facility: CLINIC | Age: 29
End: 2024-06-11
Payer: COMMERCIAL

## 2024-06-11 ENCOUNTER — LAB (OUTPATIENT)
Dept: LAB | Facility: HOSPITAL | Age: 29
End: 2024-06-11
Payer: COMMERCIAL

## 2024-06-11 DIAGNOSIS — F90.2 ADHD (ATTENTION DEFICIT HYPERACTIVITY DISORDER), COMBINED TYPE: Primary | ICD-10-CM

## 2024-06-11 DIAGNOSIS — F90.2 ADHD (ATTENTION DEFICIT HYPERACTIVITY DISORDER), COMBINED TYPE: ICD-10-CM

## 2024-06-11 DIAGNOSIS — Z79.899 MEDICATION MANAGEMENT: ICD-10-CM

## 2024-06-11 LAB
AMPHET+METHAMPHET UR QL: NEGATIVE
AMPHETAMINES UR QL: NEGATIVE
BARBITURATES UR QL SCN: NEGATIVE
BENZODIAZ UR QL SCN: NEGATIVE
BUPRENORPHINE SERPL-MCNC: NEGATIVE NG/ML
CANNABINOIDS SERPL QL: POSITIVE
COCAINE UR QL: NEGATIVE
METHADONE UR QL SCN: NEGATIVE
OPIATES UR QL: NEGATIVE
OXYCODONE UR QL SCN: NEGATIVE
PCP UR QL SCN: NEGATIVE
TRICYCLICS UR QL SCN: NEGATIVE

## 2024-06-11 PROCEDURE — 80306 DRUG TEST PRSMV INSTRMNT: CPT

## 2024-06-11 NOTE — TELEPHONE ENCOUNTER
Patient has been advised she is going to go today to do the urine drug screen so provider can review results.

## 2024-06-11 NOTE — TELEPHONE ENCOUNTER
She can. I will put the order in for the hospital. I will only be in office one more day, so it would be best if she can complete today.

## 2024-06-11 NOTE — TELEPHONE ENCOUNTER
Patient called states she is on Trintellix and Strattera having extreme nausea and vomiting in the morning after taking medications. She said she too Trintellix and Adderall before and did not have these symptoms. Wanting to know if she can go ahead and do urine drug screen to get back on Adderall or what to do. Please advise

## 2024-06-12 NOTE — TELEPHONE ENCOUNTER
"I called and spoke with Pt directly. Provided information per Provider above. Pt stated \"I know that my drug screen still shows + for THC, but I don't know how, unless it's hanging on for dear life, but I know it can't be a large amount.\" \"This is why we pushed out starting a stimulant, because I was trying to get a clean urine to start a stimulant.\" \"I guess I'll just continue the Straterra, and keep having nausea for the next month, since \"Provider\" will be on vacation... yeah me!\" Pt questions when she can take another urine drug screen, so that she is able to start a stimulant? Please advise. "

## 2024-06-12 NOTE — TELEPHONE ENCOUNTER
KANDI Rodriguez asked writer of lymphedema. Educated RN will call to lymphedema OT.Call to Supervisor Serene, communication with Serene and KANDI Rodriguez for lymphedema questions.Service code issued for above efforts.   Pt would like to know when she can do another one, so she can start the stimulant once the UDS shows clean. Please advise.

## 2024-06-12 NOTE — TELEPHONE ENCOUNTER
Patient can view results in [facility]. We will not be able to use a stimulant at this time. She can either hold Strattera until nausea and vomiting improves or try to continue and see if it resolves. Let me know.

## 2024-06-12 NOTE — TELEPHONE ENCOUNTER
Called and spoke with Pt and gave her date for repeat UDS per Provider above. Pt will call that wk to request an order.

## 2024-07-03 DIAGNOSIS — Z79.899 MEDICATION MANAGEMENT: ICD-10-CM

## 2024-07-03 DIAGNOSIS — F90.2 ADHD (ATTENTION DEFICIT HYPERACTIVITY DISORDER), COMBINED TYPE: Primary | ICD-10-CM

## 2024-07-10 ENCOUNTER — LAB (OUTPATIENT)
Dept: LAB | Facility: HOSPITAL | Age: 29
End: 2024-07-10
Payer: COMMERCIAL

## 2024-07-10 DIAGNOSIS — Z79.899 MEDICATION MANAGEMENT: ICD-10-CM

## 2024-07-10 DIAGNOSIS — F90.2 ADHD (ATTENTION DEFICIT HYPERACTIVITY DISORDER), COMBINED TYPE: ICD-10-CM

## 2024-07-10 PROCEDURE — 80306 DRUG TEST PRSMV INSTRMNT: CPT

## 2024-07-11 ENCOUNTER — TELEPHONE (OUTPATIENT)
Dept: PSYCHIATRY | Facility: CLINIC | Age: 29
End: 2024-07-11
Payer: COMMERCIAL

## 2024-07-11 NOTE — TELEPHONE ENCOUNTER
Patient called in left voicemail wanting to speak to provider regarding her urine drug screen to see if results was in and she could restart her ADHD medication. Please advise

## 2024-07-11 NOTE — TELEPHONE ENCOUNTER
That is correct. Levels can be unpredictable based on how much one uses, type of substances, and other physical factors. She is welcome to call for an earlier appointment, but we will have to begin discussing medications at visits.

## 2024-07-11 NOTE — TELEPHONE ENCOUNTER
Patient called in said she seen she was positive for THC again she went back and last use of delta 8 gummies was in May she is not sure why she is showing positive She said current medication for ADHD is making her sick. I advised her we are still waiting on confirmation and that will actually tell the amount in her system. I advised her this can take 7-10 business days so she will have to wait until provider gets results and reviews but we will notify her once received.

## 2024-07-16 LAB — REF LAB TEST METHOD: NORMAL

## 2024-07-17 ENCOUNTER — TELEPHONE (OUTPATIENT)
Dept: PSYCHIATRY | Facility: CLINIC | Age: 29
End: 2024-07-17
Payer: COMMERCIAL

## 2024-07-17 NOTE — TELEPHONE ENCOUNTER
----- Message from Mirna Vangie sent at 7/16/2024  6:12 PM EDT -----  It appears levels of THC are going down, but it was still confirming THC. Therefore, we will address medication at appointments only. She can try to move up appointment if she would like to be seen sooner than 08/15/24. Can you please inform the mirza  ent since she called to inquire about results? Thank you

## 2024-07-17 NOTE — TELEPHONE ENCOUNTER
Left Patient a message advising that we had some sooner appointments open for provider if she would return my call we could work in. When Patient calls back will advise of the entire message from provider.

## 2024-08-09 ENCOUNTER — TELEPHONE (OUTPATIENT)
Dept: PSYCHIATRY | Facility: CLINIC | Age: 29
End: 2024-08-09
Payer: COMMERCIAL

## 2024-08-09 DIAGNOSIS — G47.09 OTHER INSOMNIA: ICD-10-CM

## 2024-08-09 RX ORDER — TRAZODONE HYDROCHLORIDE 50 MG/1
25-50 TABLET ORAL NIGHTLY
Qty: 30 TABLET | Refills: 1 | Status: SHIPPED | OUTPATIENT
Start: 2024-08-09

## 2024-08-09 NOTE — TELEPHONE ENCOUNTER
Rx Refill Note  Requested Prescriptions     Pending Prescriptions Disp Refills    traZODone (DESYREL) 50 MG tablet 30 tablet 1     Sig: Take 0.5-1 tablets by mouth Every Night.      Last office visit with prescribing clinician: 5/29/2024   Last telemedicine visit with prescribing clinician: 2/12/2024   Next office visit with prescribing clinician: 8/15/2024                         Would you like a call back once the refill request has been completed: [] Yes [] No    If the office needs to give you a call back, can they leave a voicemail: [] Yes [] No    Leo Tran MA  08/09/24, 08:07 EDT

## 2024-08-14 NOTE — PROGRESS NOTES
"Chief Complaint  Depression, anxiety, and ADHD, combined type    Subjective          Courtney Saleh presents to [facility] by herself for a follow up and medication check.     History of Present Illness: Courtney states, \"I am good.  Confused.\"  Courtney tells me that she is unsure why she tested positive for the third time in July.  She reports her last use of a delta 9 \"gummy\" as being May.  She had anticipated a positive result in June.  Now, she is wondering if a medication or something she has eaten resulted in the positive screen.  I provided information about the confirmatory test the [facility] outpatient lab completes and that her screen was in fact positive in July, although the level was low.  Patient has been taking Strattera and states, \"Strattera works for me but not as well as Adderall.\"  She reports having a constant nausea immediately after she takes the medication.  Taking with food and laying down for a short time afterwards helps.  She was unable to tolerate Trintellix and Strattera together due to nausea and vomiting.  She denies any signs of depression at this time.  She reports positive life changes have improved mood.  She reports having anxiety and attributes financial concerns and strain to her worry.  She is sleeping better with the addition of trazodone.  She is taking 50 mg nightly. She is not taking Adderall IR or Trintellix.  She denies any SI/HI/AVH.      Objective   Vital Signs:   /74   Pulse 92   Ht 157.5 cm (62\")   Wt 64.4 kg (142 lb)   SpO2 98%   BMI 25.97 kg/m²       PHQ-9 Score:   PHQ-9 Total Score: 7     SOFIA-7 Score:  SOFIA 7 Total Score: 3    Mental Status Exam:   Hygiene:   good  Cooperation:  Cooperative  Eye Contact:  Good  Psychomotor Behavior:  Restless  Affect:  Appropriate  Mood: normal  Speech:  Normal  Thought Process:  Goal directed and Linear  Thought Content:  Normal  Suicidal:  None  Homicidal:  " None  Hallucinations:  None  Delusion:  None  Memory:  Intact  Orientation:  Person, Place, Time and Situation  Reliability:  good  Insight:  Good  Judgement:  Good  Impulse Control:  Good  Physical/Medical Issues:  Yes GERD and sciatic nerve pain      Current Medications:   Current Outpatient Medications   Medication Sig Dispense Refill    omeprazole (priLOSEC) 20 MG capsule Take 1 capsule by mouth Daily.      traZODone (DESYREL) 50 MG tablet Take 0.5-1 tablets by mouth Every Night. 30 tablet 1     No current facility-administered medications for this visit.   Physical Exam  Vitals and nursing note reviewed.   Constitutional:       Appearance: Normal appearance. She is well-developed and normal weight.   Musculoskeletal:         General: Normal range of motion.   Skin:     General: Skin is warm and dry.   Neurological:      General: No focal deficit present.      Mental Status: She is alert and oriented to person, place, and time.   Psychiatric:         Attention and Perception: Attention normal.         Mood and Affect: Mood and affect normal.         Behavior: Behavior normal. Hyperactive: restless. Behavior is cooperative.         Thought Content: Thought content normal.         Cognition and Memory: Cognition normal.         Judgment: Judgment normal.        The following data was reviewed by: RUTHIE Brannon on 08/15/2024:        Assessment and Plan    Diagnoses and all orders for this visit:    1. ADHD (attention deficit hyperactivity disorder), combined type (Primary)  -     Urine Drug Screen - Urine, Clean Catch; Future    2. Medication management  -     Urine Drug Screen - Urine, Clean Catch; Future    3. Other insomnia    4. SOFIA (generalized anxiety disorder)    5. Mild recurrent major depression              Impression:  -This is a follow up and medication check. Courtney reports a stable mood.  She has anxiety with concerns of finances.  She presents with a desire to resume her previous  ADHD treatment with Adderall IR 20 mg daily.  She has tested positive for THC on her last 2 urine drug screens.  She was not expecting the last positive result as she reports her last use being in May.  Today, we discussed the inability to predict how long THC will stay in her system.  There are many variables which influence this.  She verbalizes her understanding.  She reinforces her desire to resume her previous medication and to no longer use cannabis.  She notes that it had been beneficial on pain and sleep, but trazodone is treating her sleep problems and Strattera has not been as effective for her.  She is also dealing with constant nausea after taking the medication.  I provided education for the patient for a third time about the potential for harm when combining stimulants and cannabis.  I reminded the patient that the medication will be stopped upon any further positive urine drug screens.  She verbalizes her understanding.  She also agrees to being seen on a regular 2-month basis with urine drug screens completed prior to her visits.  She denies any concerns for depression at this time and would like to continue the use of trazodone.  -Stop Strattera due to patient's perceived adverse effects.  -Stop Trintellix since patient does not take this medicine currently.  -Upon review of UDS, resume Adderall 20 mg daily.  -Continue Trazodone 50 mg nightly for insomnia. Patient has refills.   -The KARINE report, reviewed through PDMP, of the past 12 months were reviewed and is appropriate.  The patient/guardian reports taking the medication only as prescribed.  The patient/guardian denies any abuse or misuse of the medication.  The patient/guardian denies any other substance use or issues.  There are no apparent substance related issues.  The patient reports no side effects of the current medication usage.  The patient/guardian has reported significant improvement with medication usage and wishes to continue  medication as prescribed.  The patient/guardian is appropriate to continue with current medication usage at this time.  Reinforced risks and side effects of medication usage, patient and/or guardian verbalize understanding in their own words and are in agreement with current plan.  -Last UDS 07/10/24.  Positive THC. I will collect a urine drug screen for compliance.      TREATMENT PLAN/GOALS: Continue supportive psychotherapy efforts and medications as indicated. Treatment and medication options discussed during today's visit. Patient ackowledged and verbally consented to continue with current treatment plan and was educated on the importance of compliance with treatment and follow-up appointments.    MEDICATION ISSUES:    We discussed risks, benefits, and side effects of the above medications and the patient was agreeable with the plan. Patient was educated on the importance of compliance with treatment and follow-up appointments.  Patient is agreeable to call the office with any worsening of symptoms or onset of side effects. Patient is agreeable to call 911 or go to the nearest ER should he/she begin having SI/HI.      Counseled patient regarding multimodal approach with healthy nutrition, healthy sleep, regular physical activity, social activities, counseling, and medications.      Coping skills reviewed and encouraged positive framing of thoughts     Assisted patient in processing above session content; acknowledged and normalized patient's thoughts, feelings, and concerns.  Applied  positive coping skills and behavior management in session.  Allowed patient to freely discuss issues without interruption or judgment. Provided safe, confidential environment to facilitate the development of positive therapeutic relationship and encourage open, honest communication. Assisted patient in identifying risk factors which would indicate the need for higher level of care including thoughts to harm self or others and/or  self-harming behavior and encouraged patient to contact this office, call 911, or present to the nearest emergency room should any of these events occur. Discussed crisis intervention services and means to access.     MEDS ORDERED DURING VISIT:  No orders of the defined types were placed in this encounter.      I spent 40 minutes caring for Courtney on this date of service. This time includes time spent by me in the following activities:preparing for the visit, obtaining and/or reviewing a separately obtained history, performing a medically appropriate examination and/or evaluation , counseling and educating the patient/family/caregiver, ordering medications, tests, or procedures, documenting information in the medical record, and care coordination      Follow Up   Return in about 2 months (around 10/15/2024) for Medication Check, Labs with nex visit.    Patient was given instructions and counseling regarding her condition or for health maintenance advice. Please see specific information pulled into the AVS if appropriate.     This document has been electronically signed by RUTHIE Brannon  August 15, 2024 15:12 EDT      This document has been electronically signed by RUTHIE Valentin, PMHNP-BC  August 15, 2024 15:12 EDT    Part of this note may be an electronic transcription/translation of spoken language to printed text using the Dragon Dictation System.

## 2024-08-15 ENCOUNTER — LAB (OUTPATIENT)
Dept: LAB | Facility: HOSPITAL | Age: 29
End: 2024-08-15
Payer: COMMERCIAL

## 2024-08-15 ENCOUNTER — OFFICE VISIT (OUTPATIENT)
Dept: PSYCHIATRY | Facility: CLINIC | Age: 29
End: 2024-08-15
Payer: COMMERCIAL

## 2024-08-15 VITALS
SYSTOLIC BLOOD PRESSURE: 114 MMHG | WEIGHT: 142 LBS | HEIGHT: 62 IN | BODY MASS INDEX: 26.13 KG/M2 | HEART RATE: 92 BPM | OXYGEN SATURATION: 98 % | DIASTOLIC BLOOD PRESSURE: 74 MMHG

## 2024-08-15 DIAGNOSIS — G47.09 OTHER INSOMNIA: Chronic | ICD-10-CM

## 2024-08-15 DIAGNOSIS — F33.0 MILD RECURRENT MAJOR DEPRESSION: Chronic | ICD-10-CM

## 2024-08-15 DIAGNOSIS — Z79.899 MEDICATION MANAGEMENT: ICD-10-CM

## 2024-08-15 DIAGNOSIS — F41.1 GAD (GENERALIZED ANXIETY DISORDER): Chronic | ICD-10-CM

## 2024-08-15 DIAGNOSIS — F90.2 ADHD (ATTENTION DEFICIT HYPERACTIVITY DISORDER), COMBINED TYPE: Primary | ICD-10-CM

## 2024-08-15 DIAGNOSIS — F90.2 ADHD (ATTENTION DEFICIT HYPERACTIVITY DISORDER), COMBINED TYPE: Primary | Chronic | ICD-10-CM

## 2024-08-15 DIAGNOSIS — F90.2 ADHD (ATTENTION DEFICIT HYPERACTIVITY DISORDER), COMBINED TYPE: ICD-10-CM

## 2024-08-15 LAB
AMPHET+METHAMPHET UR QL: NEGATIVE
AMPHETAMINES UR QL: NEGATIVE
BARBITURATES UR QL SCN: NEGATIVE
BENZODIAZ UR QL SCN: NEGATIVE
BUPRENORPHINE SERPL-MCNC: NEGATIVE NG/ML
CANNABINOIDS SERPL QL: NEGATIVE
COCAINE UR QL: NEGATIVE
METHADONE UR QL SCN: NEGATIVE
OPIATES UR QL: NEGATIVE
OXYCODONE UR QL SCN: NEGATIVE
PCP UR QL SCN: NEGATIVE
TRICYCLICS UR QL SCN: NEGATIVE

## 2024-08-15 PROCEDURE — 80306 DRUG TEST PRSMV INSTRMNT: CPT

## 2024-08-15 PROCEDURE — 99214 OFFICE O/P EST MOD 30 MIN: CPT | Performed by: NURSE PRACTITIONER

## 2024-08-15 RX ORDER — DEXTROAMPHETAMINE SACCHARATE, AMPHETAMINE ASPARTATE, DEXTROAMPHETAMINE SULFATE AND AMPHETAMINE SULFATE 5; 5; 5; 5 MG/1; MG/1; MG/1; MG/1
20 TABLET ORAL DAILY
Qty: 30 TABLET | Refills: 0 | Status: SHIPPED | OUTPATIENT
Start: 2024-08-15

## 2024-09-12 DIAGNOSIS — F90.2 ADHD (ATTENTION DEFICIT HYPERACTIVITY DISORDER), COMBINED TYPE: ICD-10-CM

## 2024-09-12 RX ORDER — DEXTROAMPHETAMINE SACCHARATE, AMPHETAMINE ASPARTATE, DEXTROAMPHETAMINE SULFATE AND AMPHETAMINE SULFATE 5; 5; 5; 5 MG/1; MG/1; MG/1; MG/1
20 TABLET ORAL DAILY
Qty: 30 TABLET | Refills: 0 | Status: SHIPPED | OUTPATIENT
Start: 2024-09-12

## 2024-09-12 NOTE — TELEPHONE ENCOUNTER
Rx Refill Note  Requested Prescriptions     Pending Prescriptions Disp Refills    amphetamine-dextroamphetamine (ADDERALL) 20 MG tablet 30 tablet 0     Sig: Take 1 tablet by mouth Daily.      Last office visit with prescribing clinician: 8/15/2024   Last telemedicine visit with prescribing clinician: Visit date not found   Next office visit with prescribing clinician: 10/16/2024                         Would you like a call back once the refill request has been completed: [] Yes [] No    If the office needs to give you a call back, can they leave a voicemail: [] Yes [] No    Idalmis Samayoa CMA  09/12/24, 12:16 EDT

## 2024-09-27 ENCOUNTER — OFFICE VISIT (OUTPATIENT)
Dept: FAMILY MEDICINE CLINIC | Facility: CLINIC | Age: 29
End: 2024-09-27
Payer: COMMERCIAL

## 2024-09-27 VITALS
HEIGHT: 62 IN | OXYGEN SATURATION: 100 % | SYSTOLIC BLOOD PRESSURE: 120 MMHG | HEART RATE: 76 BPM | DIASTOLIC BLOOD PRESSURE: 90 MMHG | WEIGHT: 145 LBS | BODY MASS INDEX: 26.68 KG/M2

## 2024-09-27 DIAGNOSIS — B34.9 VIRAL SYNDROME: Primary | ICD-10-CM

## 2024-09-27 LAB
EXPIRATION DATE: NORMAL
EXPIRATION DATE: NORMAL
FLUAV AG UPPER RESP QL IA.RAPID: NOT DETECTED
FLUBV AG UPPER RESP QL IA.RAPID: NOT DETECTED
INTERNAL CONTROL: NORMAL
INTERNAL CONTROL: NORMAL
Lab: NORMAL
Lab: NORMAL
S PYO AG THROAT QL: NEGATIVE
SARS-COV-2 AG UPPER RESP QL IA.RAPID: NOT DETECTED

## 2024-09-27 PROCEDURE — 87880 STREP A ASSAY W/OPTIC: CPT | Performed by: NURSE PRACTITIONER

## 2024-09-27 PROCEDURE — 87428 SARSCOV & INF VIR A&B AG IA: CPT | Performed by: NURSE PRACTITIONER

## 2024-09-27 PROCEDURE — 99213 OFFICE O/P EST LOW 20 MIN: CPT | Performed by: NURSE PRACTITIONER

## 2024-09-27 RX ORDER — ONDANSETRON 4 MG/1
4 TABLET, ORALLY DISINTEGRATING ORAL EVERY 8 HOURS PRN
Qty: 15 TABLET | Refills: 0 | Status: SHIPPED | OUTPATIENT
Start: 2024-09-27

## 2024-10-05 DIAGNOSIS — G47.09 OTHER INSOMNIA: ICD-10-CM

## 2024-10-07 RX ORDER — TRAZODONE HYDROCHLORIDE 50 MG/1
TABLET, FILM COATED ORAL
Qty: 30 TABLET | Refills: 0 | Status: SHIPPED | OUTPATIENT
Start: 2024-10-07

## 2024-10-11 DIAGNOSIS — F90.2 ADHD (ATTENTION DEFICIT HYPERACTIVITY DISORDER), COMBINED TYPE: ICD-10-CM

## 2024-10-11 RX ORDER — DEXTROAMPHETAMINE SACCHARATE, AMPHETAMINE ASPARTATE, DEXTROAMPHETAMINE SULFATE AND AMPHETAMINE SULFATE 5; 5; 5; 5 MG/1; MG/1; MG/1; MG/1
20 TABLET ORAL DAILY
Qty: 30 TABLET | Refills: 0 | Status: SHIPPED | OUTPATIENT
Start: 2024-10-11

## 2024-10-11 NOTE — TELEPHONE ENCOUNTER
BRE Pt.     Rx Refill Note  Requested Prescriptions     Pending Prescriptions Disp Refills    amphetamine-dextroamphetamine (ADDERALL) 20 MG tablet 30 tablet 0     Sig: Take 1 tablet by mouth Daily.      Last office visit with prescribing clinician: 8/15/2024   Last telemedicine visit with prescribing clinician: Visit date not found   Next office visit with prescribing clinician: 10/16/2024                         Would you like a call back once the refill request has been completed: [] Yes [] No    If the office needs to give you a call back, can they leave a voicemail: [] Yes [] No    Niraj Arias MA  10/11/24, 11:38 EDT

## 2024-10-11 NOTE — TELEPHONE ENCOUNTER
Patient's KARINE report reviewed and deemed appropriate.  Patient counseled on use of controlled substances.

## 2024-10-14 NOTE — PROGRESS NOTES
"Chief Complaint  Depression, anxiety, and ADHD, combined type    Subjective          Courtney Saleh presents to [facility] by herself for a follow up and medication check.     History of Present Illness: Courtney states, \"I am good.\" Courtney tells me that she has improved focus, concentration, and attention. She denies cannabis use. She continues to work and she continues in a new relationship. There are some worries at times and she would like to consider resuming Trintellix. She is sleeping well with Trazodone each night. She denies problems with appetite and admits she is eating more since being in a new relationship. She is taking Adderall IR 20 mg and Trazodone 50 mg. She denies any side effects.  She denies any SI/HI/AVH.      Objective   Vital Signs:   BP 98/60   Pulse 82   Ht 157.5 cm (62\")   Wt 65.3 kg (144 lb)   SpO2 99%   BMI 26.34 kg/m²       PHQ-9 Score:   PHQ-9 Total Score:       SOFIA-7 Score:  SOFIA 7 Total Score: 1    Mental Status Exam:   Hygiene:   good  Cooperation:  Cooperative  Eye Contact:  Good  Psychomotor Behavior:  Restless  Affect:  Appropriate  Mood: normal  Speech:  Normal  Thought Process:  Goal directed and Linear  Thought Content:  Normal  Suicidal:  None  Homicidal:  None  Hallucinations:  None  Delusion:  None  Memory:  Intact  Orientation:  Person, Place, Time and Situation  Reliability:  good  Insight:  Good  Judgement:  Good  Impulse Control:  Good  Physical/Medical Issues:  Yes GERD and sciatic nerve pain      Current Medications:   Current Outpatient Medications   Medication Sig Dispense Refill    amphetamine-dextroamphetamine (ADDERALL) 20 MG tablet Take 1 tablet by mouth Daily. 30 tablet 0    omeprazole (priLOSEC) 20 MG capsule Take 1 capsule by mouth Daily.      ondansetron ODT (ZOFRAN-ODT) 4 MG disintegrating tablet Place 1 tablet on the tongue Every 8 (Eight) Hours As Needed for Nausea or Vomiting. 15 tablet 0    traZODone " (DESYREL) 50 MG tablet TAKE 1/2 TO 1 (ONE-HALF TO ONE) TABLET BY MOUTH ONCE DAILY AT NIGHT 30 tablet 0    Vortioxetine HBr (Trintellix) 10 MG tablet tablet Take 1 tablet by mouth Daily With Breakfast.       No current facility-administered medications for this visit.   Physical Exam  Vitals and nursing note reviewed.   Constitutional:       Appearance: Normal appearance. She is well-developed and normal weight.   Musculoskeletal:         General: Normal range of motion.   Skin:     General: Skin is warm and dry.   Neurological:      General: No focal deficit present.      Mental Status: She is alert and oriented to person, place, and time.   Psychiatric:         Attention and Perception: Attention normal.         Mood and Affect: Mood and affect normal.         Behavior: Behavior normal. Hyperactive: restless. Behavior is cooperative.         Thought Content: Thought content normal.         Cognition and Memory: Cognition normal.         Judgment: Judgment normal.        The following data was reviewed by: RUTHIE Brannon on 10/16/2024:    Office Visit with Glo Benavides APRN (09/27/2024)     Assessment and Plan    Diagnoses and all orders for this visit:    1. SOFIA (generalized anxiety disorder) (Primary)    2. ADHD (attention deficit hyperactivity disorder), combined type  -     Compliance Drug Analysis, Ur - Urine, Clean Catch    3. Medication management  -     Compliance Drug Analysis, Ur - Urine, Clean Catch    4. Other insomnia    5. Mild recurrent major depression                Impression:  -This is a follow up and medication check. Courtney reports improved focus, concentration, and attention. She is feeling anxiety at times and would like to consider resuming Trintellix. She finds Trintellix worked well with Adderall. She denies any cannabis use. She is willing to submit a UDS for compliance. She is sleeping well with Trazodone. She admits to an increased appetite and concerns for weight  gain with her new relationship, so we reviewed healthy dietary practices and reducing calories.   -Resume Trintellix 10 mg daily with food for depression and anxiety. Patient has refills.  -Continue Adderall 20 mg daily for ADHD.   -Continue Trazodone 50 mg nightly for insomnia. Patient has refills.   -The KARINE report, reviewed through PDMP, of the past 12 months were reviewed and is appropriate.  The patient/guardian reports taking the medication only as prescribed.  The patient/guardian denies any abuse or misuse of the medication.  The patient/guardian denies any other substance use or issues.  There are no apparent substance related issues.  The patient reports no side effects of the current medication usage.  The patient/guardian has reported significant improvement with medication usage and wishes to continue medication as prescribed.  The patient/guardian is appropriate to continue with current medication usage at this time.  Reinforced risks and side effects of medication usage, patient and/or guardian verbalize understanding in their own words and are in agreement with current plan.  -Last UDS 08/15/24.  Appropriate. I will collect UDS for compliance.       TREATMENT PLAN/GOALS: Continue supportive psychotherapy efforts and medications as indicated. Treatment and medication options discussed during today's visit. Patient ackowledged and verbally consented to continue with current treatment plan and was educated on the importance of compliance with treatment and follow-up appointments.    MEDICATION ISSUES:    We discussed risks, benefits, and side effects of the above medications and the patient was agreeable with the plan. Patient was educated on the importance of compliance with treatment and follow-up appointments.  Patient is agreeable to call the office with any worsening of symptoms or onset of side effects. Patient is agreeable to call 911 or go to the nearest ER should he/she begin having SI/HI.       Counseled patient regarding multimodal approach with healthy nutrition, healthy sleep, regular physical activity, social activities, counseling, and medications.      Coping skills reviewed and encouraged positive framing of thoughts     Assisted patient in processing above session content; acknowledged and normalized patient's thoughts, feelings, and concerns.  Applied  positive coping skills and behavior management in session.  Allowed patient to freely discuss issues without interruption or judgment. Provided safe, confidential environment to facilitate the development of positive therapeutic relationship and encourage open, honest communication. Assisted patient in identifying risk factors which would indicate the need for higher level of care including thoughts to harm self or others and/or self-harming behavior and encouraged patient to contact this office, call 911, or present to the nearest emergency room should any of these events occur. Discussed crisis intervention services and means to access.     MEDS ORDERED DURING VISIT:  No orders of the defined types were placed in this encounter.      I spent 40 minutes caring for Courtney on this date of service. This time includes time spent by me in the following activities:preparing for the visit, obtaining and/or reviewing a separately obtained history, performing a medically appropriate examination and/or evaluation , counseling and educating the patient/family/caregiver, ordering medications, tests, or procedures, documenting information in the medical record, and care coordination      Follow Up   Return in about 2 months (around 12/16/2024) for Medication Check, Labs with nex visit.    Patient was given instructions and counseling regarding her condition or for health maintenance advice. Please see specific information pulled into the AVS if appropriate.     This document has been electronically signed by Mirna Vences, RUTHIE  October 16,  2024 21:00 EDT      This document has been electronically signed by RUTHIE Valentin, PMHNP-BC  October 16, 2024 21:00 EDT    Part of this note may be an electronic transcription/translation of spoken language to printed text using the Dragon Dictation System.

## 2024-10-16 ENCOUNTER — OFFICE VISIT (OUTPATIENT)
Dept: PSYCHIATRY | Facility: CLINIC | Age: 29
End: 2024-10-16
Payer: COMMERCIAL

## 2024-10-16 VITALS
HEIGHT: 62 IN | HEART RATE: 82 BPM | SYSTOLIC BLOOD PRESSURE: 98 MMHG | OXYGEN SATURATION: 99 % | DIASTOLIC BLOOD PRESSURE: 60 MMHG | BODY MASS INDEX: 26.5 KG/M2 | WEIGHT: 144 LBS

## 2024-10-16 DIAGNOSIS — F90.2 ADHD (ATTENTION DEFICIT HYPERACTIVITY DISORDER), COMBINED TYPE: Chronic | ICD-10-CM

## 2024-10-16 DIAGNOSIS — F33.0 MILD RECURRENT MAJOR DEPRESSION: Chronic | ICD-10-CM

## 2024-10-16 DIAGNOSIS — Z79.899 MEDICATION MANAGEMENT: ICD-10-CM

## 2024-10-16 DIAGNOSIS — F41.1 GAD (GENERALIZED ANXIETY DISORDER): Primary | Chronic | ICD-10-CM

## 2024-10-16 DIAGNOSIS — G47.09 OTHER INSOMNIA: Chronic | ICD-10-CM

## 2024-10-16 PROCEDURE — 99214 OFFICE O/P EST MOD 30 MIN: CPT | Performed by: NURSE PRACTITIONER

## 2024-10-28 LAB — DRUGS UR: NORMAL

## 2024-11-03 DIAGNOSIS — G47.09 OTHER INSOMNIA: ICD-10-CM

## 2024-11-04 RX ORDER — TRAZODONE HYDROCHLORIDE 50 MG/1
TABLET, FILM COATED ORAL
Qty: 30 TABLET | Refills: 2 | Status: SHIPPED | OUTPATIENT
Start: 2024-11-04

## 2024-11-12 DIAGNOSIS — F90.2 ADHD (ATTENTION DEFICIT HYPERACTIVITY DISORDER), COMBINED TYPE: ICD-10-CM

## 2024-11-12 RX ORDER — DEXTROAMPHETAMINE SACCHARATE, AMPHETAMINE ASPARTATE, DEXTROAMPHETAMINE SULFATE AND AMPHETAMINE SULFATE 5; 5; 5; 5 MG/1; MG/1; MG/1; MG/1
20 TABLET ORAL DAILY
Qty: 30 TABLET | Refills: 0 | Status: SHIPPED | OUTPATIENT
Start: 2024-11-12

## 2024-11-12 NOTE — TELEPHONE ENCOUNTER
Rx Refill Note  Requested Prescriptions     Pending Prescriptions Disp Refills    amphetamine-dextroamphetamine (ADDERALL) 20 MG tablet 30 tablet 0     Sig: Take 1 tablet by mouth Daily.      Last office visit with prescribing clinician: 10/16/2024   Last telemedicine visit with prescribing clinician: Visit date not found   Next office visit with prescribing clinician: 12/18/2024                         Would you like a call back once the refill request has been completed: [] Yes [] No    If the office needs to give you a call back, can they leave a voicemail: [] Yes [] No    Idalmis Samayoa CMA  11/12/24, 10:11 EST

## 2024-12-03 ENCOUNTER — OFFICE VISIT (OUTPATIENT)
Dept: FAMILY MEDICINE CLINIC | Facility: CLINIC | Age: 29
End: 2024-12-03
Payer: COMMERCIAL

## 2024-12-03 VITALS
TEMPERATURE: 97.9 F | HEIGHT: 62 IN | HEART RATE: 76 BPM | BODY MASS INDEX: 26.5 KG/M2 | WEIGHT: 144 LBS | RESPIRATION RATE: 16 BRPM | DIASTOLIC BLOOD PRESSURE: 78 MMHG | SYSTOLIC BLOOD PRESSURE: 116 MMHG | OXYGEN SATURATION: 98 %

## 2024-12-03 DIAGNOSIS — R49.0 HOARSENESS OF VOICE: ICD-10-CM

## 2024-12-03 DIAGNOSIS — J06.9 ACUTE URI: Primary | ICD-10-CM

## 2024-12-03 PROCEDURE — 99213 OFFICE O/P EST LOW 20 MIN: CPT | Performed by: NURSE PRACTITIONER

## 2024-12-03 RX ORDER — METHYLPREDNISOLONE 4 MG/1
TABLET ORAL
Qty: 21 TABLET | Refills: 0 | Status: SHIPPED | OUTPATIENT
Start: 2024-12-03

## 2024-12-03 RX ORDER — AZITHROMYCIN 250 MG/1
TABLET, FILM COATED ORAL
Qty: 6 TABLET | Refills: 0 | Status: SHIPPED | OUTPATIENT
Start: 2024-12-03

## 2024-12-03 NOTE — PROGRESS NOTES
"                      Established Patient        Chief Complaint:   Chief Complaint   Patient presents with    Sore Throat     Pt is here for hoarseness and scratchy throat          History of Present Illness:    Courtney Saleh is a 29 y.o. female     Woke up 2-3 days ago with green nasal drainage. Has subsided. Denies runny nose, congestion    Hoarseness, scratchy throat.    Denies fever, N/V/D.    Reports that daughter was diagnosed with walking pneumonia 2 weeks ago.     Subjective     The following portions of the patient's history were reviewed and updated as appropriate: allergies, current medications, past family history, past medical history, past social history, past surgical history and problem list.    ALLERGIES  No Known Allergies    ROS  Review of Systems   Constitutional:  Positive for fever.   HENT:  Positive for postnasal drip, sore throat and voice change. Negative for congestion, rhinorrhea, sinus pressure and sinus pain.    Respiratory:  Negative for cough.    Neurological:  Positive for headaches.     Objective     Vital Signs:   /78   Pulse 76   Temp 97.9 °F (36.6 °C) (Axillary)   Resp 16   Ht 157.5 cm (62\")   Wt 65.3 kg (144 lb)   SpO2 98%   BMI 26.34 kg/m²                Physical Exam   Physical Exam  Vitals and nursing note reviewed.   HENT:      Right Ear: Tympanic membrane normal.      Left Ear: Tympanic membrane normal.      Nose: Nose normal. Mucosal edema and congestion present. No rhinorrhea.      Right Turbinates: Swollen.      Left Turbinates: Swollen.      Right Sinus: No maxillary sinus tenderness or frontal sinus tenderness.      Left Sinus: No maxillary sinus tenderness or frontal sinus tenderness.      Mouth/Throat:      Lips: Pink.      Pharynx: Posterior oropharyngeal erythema and postnasal drip present. No pharyngeal swelling, oropharyngeal exudate or uvula swelling.   Eyes:      General: Lids are normal.      Conjunctiva/sclera: Conjunctivae normal.      Pupils: " Pupils are equal, round, and reactive to light.   Cardiovascular:      Rate and Rhythm: Normal rate and regular rhythm.      Heart sounds: Normal heart sounds.   Pulmonary:      Effort: Pulmonary effort is normal.      Breath sounds: Normal breath sounds.   Lymphadenopathy:      Cervical: No cervical adenopathy.   Neurological:      Mental Status: She is alert and oriented to person, place, and time.      Gait: Gait is intact.   Psychiatric:         Attention and Perception: Attention normal.         Mood and Affect: Mood and affect normal.         Speech: Speech normal.         Behavior: Behavior normal. Behavior is cooperative.         Assessment and Plan      Assessment/Plan:   Diagnoses and all orders for this visit:    1. Acute URI (Primary)  -     methylPREDNISolone (MEDROL) 4 MG dose pack; Take as directed on package instructions.  Dispense: 21 tablet; Refill: 0  -     azithromycin (Zithromax Z-Elie) 250 MG tablet; Take 2 tablets by mouth on day 1, then 1 tablet daily on days 2-5  Dispense: 6 tablet; Refill: 0    2. Hoarseness of voice  -     methylPREDNISolone (MEDROL) 4 MG dose pack; Take as directed on package instructions.  Dispense: 21 tablet; Refill: 0    Risks, benefits, and potential side effects of current/new medications reviewed with patient.  Patient voiced understanding and wished to proceed with treatment.    May alternate Acetaminophen and Ibuprofen every 4-6 hours as needed for pain, fever > 101.    Increased clear liquid intake.     Patient was encouraged to keep me informed of any acute changes, lack of improvement, or any new concerning symptoms.      Discussion Summary:  Discussed plan of care in detail with pt today; pt verb understanding and agrees.        I have reviewed and updated all copied forward information, as appropriate.  I attest to the accuracy and relevance of any unchanged information.      Follow up:  No follow-ups on file.     Patient Education:  There are no Patient  Instructions on file for this visit.    RUTHIE Vora  12/03/24  10:31 EST          Please note that portions of this note may have been completed with a voice recognition program.

## 2024-12-11 DIAGNOSIS — F90.2 ADHD (ATTENTION DEFICIT HYPERACTIVITY DISORDER), COMBINED TYPE: ICD-10-CM

## 2024-12-11 RX ORDER — DEXTROAMPHETAMINE SACCHARATE, AMPHETAMINE ASPARTATE, DEXTROAMPHETAMINE SULFATE AND AMPHETAMINE SULFATE 5; 5; 5; 5 MG/1; MG/1; MG/1; MG/1
20 TABLET ORAL DAILY
Qty: 30 TABLET | Refills: 0 | Status: SHIPPED | OUTPATIENT
Start: 2024-12-11

## 2024-12-11 NOTE — TELEPHONE ENCOUNTER
Rx Refill Note  Requested Prescriptions     Pending Prescriptions Disp Refills    amphetamine-dextroamphetamine (ADDERALL) 20 MG tablet 30 tablet 0     Sig: Take 1 tablet by mouth Daily.      Last office visit with prescribing clinician: 10/16/2024   Last telemedicine visit with prescribing clinician: Visit date not found   Next office visit with prescribing clinician: 12/18/2024                         Would you like a call back once the refill request has been completed: [] Yes [] No    If the office needs to give you a call back, can they leave a voicemail: [] Yes [] No    Idalmis Samayoa CMA  12/11/24, 08:25 EST

## 2024-12-16 NOTE — PROGRESS NOTES
"This provider is located at [facility] ,[address], [address] in [place], [place],using a secure MyChart Video Visit through [employer]. Patient is being seen remotely via telehealth at their home address in [place], and stated they are in a secure environment for this session. The patient's condition being diagnosed/treated is appropriate for telemedicine. The provider identified herself as well as her credentials.   The patient, and/or patients guardian, consent to be seen remotely, and when consent is given they understand that the consent allows for patient identifiable information to be sent to a third party as needed.   They may refuse to be seen remotely at any time. The electronic data is encrypted and password protected, and the patient and/or guardian has been advised of the potential risks to privacy not withstanding such measures.    Mode of Visit: Video   Location of patient: -HOME-   Location of provider: +[facility]   You have chosen to receive care through a telehealth visit.   The patient has signed the video visit consent form.   The visit included audio and video interaction. No technical issues occurred during this visit.     Chief Complaint  Depression, anxiety, and ADHD, combined type    Subjective          Courtney Saleh presents to [facility] by herself for a follow up and medication check.     History of Present Illness: Courtney states, \"I have been good.\" Courtney tells me that she was recently engaged and is planning a wedding for early March. She has been working more due to the holidays and trying to prepare for the holidays. She has added his three children and their activities into her daily life which has been positive for mood, but more stress to manage all activities. She feels more scattered and having trouble staying on task and completing tasks without bouncing from task to task. She reports " "improved mood and anxiety since resuming Trintellix. There are still times she has nausea from the medication, so she has been taking every other day or with \"heavier foods\" to help. She continues to sleep well when she uses Trazodone. She denies problems with appetite.  She is taking Trintellix, Adderall, and Trazodone. She denies any side effects.  She denies any SI/HI/AVH.      Objective   Vital Signs:   There were no vitals taken for this visit.      PHQ-9 Score:   PHQ-9 Total Score:       SOFIA-7 Score:  SOFIA 7 Total Score: (Patient-Rptd) 5    Mental Status Exam:   Hygiene:   good  Cooperation:  Cooperative  Eye Contact:  Good  Psychomotor Behavior:  Restless  Affect:  Appropriate  Mood: normal  Speech:  Normal  Thought Process:  Goal directed and Linear  Thought Content:  Normal  Suicidal:  None  Homicidal:  None  Hallucinations:  None  Delusion:  None  Memory:  Intact  Orientation:  Person, Place, Time and Situation  Reliability:  good  Insight:  Good  Judgement:  Good  Impulse Control:  Good  Physical/Medical Issues:  Yes GERD and sciatic nerve pain      Current Medications:   Current Outpatient Medications   Medication Sig Dispense Refill    amphetamine-dextroamphetamine (ADDERALL) 20 MG tablet Take 1 tablet by mouth Daily. 30 tablet 0    azithromycin (Zithromax Z-Elie) 250 MG tablet Take 2 tablets by mouth on day 1, then 1 tablet daily on days 2-5 6 tablet 0    omeprazole (priLOSEC) 20 MG capsule Take 1 capsule by mouth Daily.      ondansetron ODT (ZOFRAN-ODT) 4 MG disintegrating tablet Place 1 tablet on the tongue Every 8 (Eight) Hours As Needed for Nausea or Vomiting. 15 tablet 0    traZODone (DESYREL) 50 MG tablet TAKE 1/2 TO 1 (ONE-HALF TO ONE) TABLET BY MOUTH ONCE DAILY AT NIGHT 30 tablet 2    Vortioxetine HBr (Trintellix) 10 MG tablet tablet Take 1 tablet by mouth Daily With Breakfast.       No current facility-administered medications for this visit.   Physical Exam  Nursing note reviewed. Vitals " reviewed: No vitals to review due to nature of a telehealth visit.  Constitutional:       Appearance: Normal appearance. She is well-developed and normal weight.   Neurological:      General: No focal deficit present.      Mental Status: She is alert and oriented to person, place, and time.   Psychiatric:         Attention and Perception: Attention normal.         Mood and Affect: Mood and affect normal.         Speech: Speech normal.         Behavior: Behavior normal. Hyperactive: restless. Behavior is cooperative.         Thought Content: Thought content normal.         Cognition and Memory: Cognition normal.         Judgment: Judgment normal.        The following data was reviewed by: RUTHIE Brannon on 12/18/2024:    Office Visit with Lesley Martin APRN (12/03/2024)     Assessment and Plan    Diagnoses and all orders for this visit:    1. ADHD (attention deficit hyperactivity disorder), combined type (Primary)    2. Other insomnia    3. SOFIA (generalized anxiety disorder)    4. Mild recurrent major depression          Impression:  -This is a follow up and medication check. Courtney reports improved mood and anxiety. She reports some difficulty with executive dysfunction, but us unsure if it is related to medication or change in stress level and daily activities. She has been working more and she added her SO's three children and their activities into her life. She stopped the Adderall XR in the past due to insomnia, but Trazodone has been very helpful for her, so she is open to resuming. I suggested using the XR 15 mg daily and using a 5-10 mg dose in the morning or late afternoon to help treat the symptoms of ADHD more effectively for her schedule. She agrees to try at next refill.   -Continue Trintellix 10 mg daily with food for depression and anxiety. Patient has refills.  -Continue Adderall 20 mg daily for ADHD. Patient has refills.  -Continue Trazodone 50 mg nightly for insomnia. Patient  has refills.   -The KARINE report, reviewed through PDMP, of the past 12 months were reviewed and is appropriate.  The patient/guardian reports taking the medication only as prescribed.  The patient/guardian denies any abuse or misuse of the medication.  The patient/guardian denies any other substance use or issues.  There are no apparent substance related issues.  The patient reports no side effects of the current medication usage.  The patient/guardian has reported significant improvement with medication usage and wishes to continue medication as prescribed.  The patient/guardian is appropriate to continue with current medication usage at this time.  Reinforced risks and side effects of medication usage, patient and/or guardian verbalize understanding in their own words and are in agreement with current plan.  -Last UDS 08/15/24.  Appropriate.      TREATMENT PLAN/GOALS: Continue supportive psychotherapy efforts and medications as indicated. Treatment and medication options discussed during today's visit. Patient ackowledged and verbally consented to continue with current treatment plan and was educated on the importance of compliance with treatment and follow-up appointments.    MEDICATION ISSUES:    We discussed risks, benefits, and side effects of the above medications and the patient was agreeable with the plan. Patient was educated on the importance of compliance with treatment and follow-up appointments.  Patient is agreeable to call the office with any worsening of symptoms or onset of side effects. Patient is agreeable to call 911 or go to the nearest ER should he/she begin having SI/HI.      Counseled patient regarding multimodal approach with healthy nutrition, healthy sleep, regular physical activity, social activities, counseling, and medications.      Coping skills reviewed and encouraged positive framing of thoughts     Assisted patient in processing above session content; acknowledged and normalized  patient's thoughts, feelings, and concerns.  Applied  positive coping skills and behavior management in session.  Allowed patient to freely discuss issues without interruption or judgment. Provided safe, confidential environment to facilitate the development of positive therapeutic relationship and encourage open, honest communication. Assisted patient in identifying risk factors which would indicate the need for higher level of care including thoughts to harm self or others and/or self-harming behavior and encouraged patient to contact this office, call 911, or present to the nearest emergency room should any of these events occur. Discussed crisis intervention services and means to access.     MEDS ORDERED DURING VISIT:  No orders of the defined types were placed in this encounter.      I spent 40 minutes caring for Courtney on this date of service. This time includes time spent by me in the following activities:preparing for the visit, obtaining and/or reviewing a separately obtained history, performing a medically appropriate examination and/or evaluation , counseling and educating the patient/family/caregiver, ordering medications, tests, or procedures, documenting information in the medical record, and care coordination      Follow Up   Return in about 3 months (around 3/18/2025) for Medication Check.    Patient was given instructions and counseling regarding her condition or for health maintenance advice. Please see specific information pulled into the AVS if appropriate.     This document has been electronically signed by RUTHIE Brannon  December 18, 2024 21:28 EST      This document has been electronically signed by RUTHIE Valentin, PMHNP-BC  December 18, 2024 21:28 EST    Part of this note may be an electronic transcription/translation of spoken language to printed text using the Dragon Dictation System.

## 2024-12-18 ENCOUNTER — TELEMEDICINE (OUTPATIENT)
Dept: PSYCHIATRY | Facility: CLINIC | Age: 29
End: 2024-12-18
Payer: COMMERCIAL

## 2024-12-18 DIAGNOSIS — F90.2 ADHD (ATTENTION DEFICIT HYPERACTIVITY DISORDER), COMBINED TYPE: Primary | Chronic | ICD-10-CM

## 2024-12-18 DIAGNOSIS — F41.1 GAD (GENERALIZED ANXIETY DISORDER): Chronic | ICD-10-CM

## 2024-12-18 DIAGNOSIS — G47.09 OTHER INSOMNIA: Chronic | ICD-10-CM

## 2024-12-18 DIAGNOSIS — F33.0 MILD RECURRENT MAJOR DEPRESSION: Chronic | ICD-10-CM

## 2024-12-18 PROCEDURE — 99214 OFFICE O/P EST MOD 30 MIN: CPT | Performed by: NURSE PRACTITIONER

## 2025-01-06 DIAGNOSIS — F90.2 ADHD (ATTENTION DEFICIT HYPERACTIVITY DISORDER), COMBINED TYPE: ICD-10-CM

## 2025-01-06 RX ORDER — DEXTROAMPHETAMINE SACCHARATE, AMPHETAMINE ASPARTATE MONOHYDRATE, DEXTROAMPHETAMINE SULFATE AND AMPHETAMINE SULFATE 3.75; 3.75; 3.75; 3.75 MG/1; MG/1; MG/1; MG/1
15 CAPSULE, EXTENDED RELEASE ORAL EVERY MORNING
Qty: 30 CAPSULE | Refills: 0 | Status: SHIPPED | OUTPATIENT
Start: 2025-01-06

## 2025-01-06 RX ORDER — DEXTROAMPHETAMINE SACCHARATE, AMPHETAMINE ASPARTATE, DEXTROAMPHETAMINE SULFATE AND AMPHETAMINE SULFATE 5; 5; 5; 5 MG/1; MG/1; MG/1; MG/1
20 TABLET ORAL DAILY
Qty: 30 TABLET | Refills: 0 | Status: SHIPPED | OUTPATIENT
Start: 2025-01-06 | End: 2025-01-06

## 2025-01-06 NOTE — TELEPHONE ENCOUNTER
Rx Refill Note  Requested Prescriptions     Pending Prescriptions Disp Refills    amphetamine-dextroamphetamine (ADDERALL) 20 MG tablet 30 tablet 0     Sig: Take 1 tablet by mouth Daily.      Last office visit with prescribing clinician: 10/16/2024   Last telemedicine visit with prescribing clinician: 12/18/2024   Next office visit with prescribing clinician: 3/17/2025                         Would you like a call back once the refill request has been completed: [] Yes [] No    If the office needs to give you a call back, can they leave a voicemail: [] Yes [] No    Leo Tran MA  01/06/25, 10:33 EST

## 2025-02-02 DIAGNOSIS — G47.09 OTHER INSOMNIA: ICD-10-CM

## 2025-02-03 RX ORDER — TRAZODONE HYDROCHLORIDE 50 MG/1
TABLET, FILM COATED ORAL
Qty: 90 TABLET | Refills: 0 | Status: SHIPPED | OUTPATIENT
Start: 2025-02-03

## 2025-02-04 DIAGNOSIS — F90.2 ADHD (ATTENTION DEFICIT HYPERACTIVITY DISORDER), COMBINED TYPE: ICD-10-CM

## 2025-02-04 RX ORDER — DEXTROAMPHETAMINE SACCHARATE, AMPHETAMINE ASPARTATE MONOHYDRATE, DEXTROAMPHETAMINE SULFATE AND AMPHETAMINE SULFATE 3.75; 3.75; 3.75; 3.75 MG/1; MG/1; MG/1; MG/1
15 CAPSULE, EXTENDED RELEASE ORAL EVERY MORNING
Qty: 30 CAPSULE | Refills: 0 | Status: SHIPPED | OUTPATIENT
Start: 2025-02-04 | End: 2025-02-07 | Stop reason: SDUPTHER

## 2025-02-04 NOTE — TELEPHONE ENCOUNTER
Rx Refill Note  Requested Prescriptions     Pending Prescriptions Disp Refills    amphetamine-dextroamphetamine XR (ADDERALL XR) 15 MG 24 hr capsule 30 capsule 0     Sig: Take 1 capsule by mouth Every Morning      Last office visit with prescribing clinician: 10/16/2024   Last telemedicine visit with prescribing clinician: 12/18/2024   Next office visit with prescribing clinician: 3/17/2025                         Would you like a call back once the refill request has been completed: [] Yes [] No    If the office needs to give you a call back, can they leave a voicemail: [] Yes [] No    Leo Tran MA  02/04/25, 08:21 EST

## 2025-02-07 DIAGNOSIS — F90.2 ADHD (ATTENTION DEFICIT HYPERACTIVITY DISORDER), COMBINED TYPE: ICD-10-CM

## 2025-02-07 RX ORDER — DEXTROAMPHETAMINE SACCHARATE, AMPHETAMINE ASPARTATE MONOHYDRATE, DEXTROAMPHETAMINE SULFATE AND AMPHETAMINE SULFATE 3.75; 3.75; 3.75; 3.75 MG/1; MG/1; MG/1; MG/1
15 CAPSULE, EXTENDED RELEASE ORAL EVERY MORNING
Qty: 30 CAPSULE | Refills: 0 | Status: SHIPPED | OUTPATIENT
Start: 2025-02-07 | End: 2025-02-07 | Stop reason: SDUPTHER

## 2025-02-07 RX ORDER — DEXTROAMPHETAMINE SACCHARATE, AMPHETAMINE ASPARTATE MONOHYDRATE, DEXTROAMPHETAMINE SULFATE AND AMPHETAMINE SULFATE 3.75; 3.75; 3.75; 3.75 MG/1; MG/1; MG/1; MG/1
15 CAPSULE, EXTENDED RELEASE ORAL EVERY MORNING
Qty: 30 CAPSULE | Refills: 0 | Status: SHIPPED | OUTPATIENT
Start: 2025-02-07

## 2025-02-07 NOTE — TELEPHONE ENCOUNTER
Pt called and stated that [facility] doesn't have the Rx in stock, but [facility] in [place] does.   I told Pt she needs to be sure that the Rx is there and she confirmed. Please advise sending to [facility] in [place]. Cancelled Rx at [facility]. Thanks.

## 2025-02-07 NOTE — TELEPHONE ENCOUNTER
Cancelled Rx at [facility]  Please see Pt message  Pt is leaving out of town this afternoon.   Pt also called and left a VM to ensure this message was received.

## 2025-02-14 DIAGNOSIS — F90.2 ADHD (ATTENTION DEFICIT HYPERACTIVITY DISORDER), COMBINED TYPE: Primary | ICD-10-CM

## 2025-02-14 RX ORDER — DEXTROAMPHETAMINE SACCHARATE, AMPHETAMINE ASPARTATE, DEXTROAMPHETAMINE SULFATE AND AMPHETAMINE SULFATE 5; 5; 5; 5 MG/1; MG/1; MG/1; MG/1
1 TABLET ORAL DAILY
COMMUNITY
Start: 2025-01-08 | End: 2025-02-14 | Stop reason: SDUPTHER

## 2025-02-14 NOTE — TELEPHONE ENCOUNTER
Rx Refill Note  Requested Prescriptions     Pending Prescriptions Disp Refills    amphetamine-dextroamphetamine (ADDERALL) 20 MG tablet       Sig: Take 1 tablet by mouth Daily.      Last office visit with prescribing clinician: 10/16/2024   Last telemedicine visit with prescribing clinician: 12/18/2024   Next office visit with prescribing clinician: 3/17/2025                         Would you like a call back once the refill request has been completed: [] Yes [] No    If the office needs to give you a call back, can they leave a voicemail: [] Yes [] No    Niraj Arias MA  02/14/25, 09:53 EST

## 2025-02-15 NOTE — TELEPHONE ENCOUNTER
The patient was to use a 5-10 mg dose of IR in conjunction with the 15 mg XR. However, it appears we are using the 20 mg daily. Can we verify with patient how she is taking medication? Thanks.

## 2025-02-17 RX ORDER — DEXTROAMPHETAMINE SACCHARATE, AMPHETAMINE ASPARTATE, DEXTROAMPHETAMINE SULFATE AND AMPHETAMINE SULFATE 5; 5; 5; 5 MG/1; MG/1; MG/1; MG/1
1 TABLET ORAL DAILY
Qty: 30 TABLET | Refills: 0 | Status: SHIPPED | OUTPATIENT
Start: 2025-02-17

## 2025-02-17 NOTE — TELEPHONE ENCOUNTER
Per Patient this is working fine that she dose not take second dose all the time. Patient has been advised to space second dose to at least 6 hours. She said she is taking 75 mg of Trazodone due to was waking up after a few hours and not being able to go to sleep on the 50 mg dose. She said she had a issue with this before with the Adderall XR. She said that 75 mg of Trazodone has been working but she wanted to make provider aware.

## 2025-02-17 NOTE — TELEPHONE ENCOUNTER
Yes, there was a slight misunderstanding. But more on my part. How is this regiment working out for her? I would space out the second dose IR a bit more than 4 hours. Sounds like this is working so, I will send refills.

## 2025-02-17 NOTE — TELEPHONE ENCOUNTER
Spoke to Patient she said at last fill she thought she was to take the Adderall XR 15 mg and IR 20 mg in conjunction with XR. Taking XR in morning with IR 10 mg (breaking 20 mg in half) and then taking other half in day when needed 4-6 hours later. She said maybe she misunderstood the directions. Please advise

## 2025-03-07 DIAGNOSIS — F33.0 MILD RECURRENT MAJOR DEPRESSION: Primary | ICD-10-CM

## 2025-03-07 DIAGNOSIS — F90.2 ADHD (ATTENTION DEFICIT HYPERACTIVITY DISORDER), COMBINED TYPE: ICD-10-CM

## 2025-03-07 RX ORDER — DEXTROAMPHETAMINE SACCHARATE, AMPHETAMINE ASPARTATE MONOHYDRATE, DEXTROAMPHETAMINE SULFATE AND AMPHETAMINE SULFATE 3.75; 3.75; 3.75; 3.75 MG/1; MG/1; MG/1; MG/1
15 CAPSULE, EXTENDED RELEASE ORAL EVERY MORNING
Qty: 30 CAPSULE | Refills: 0 | Status: SHIPPED | OUTPATIENT
Start: 2025-03-07

## 2025-03-07 NOTE — TELEPHONE ENCOUNTER
Rx Refill Note  Requested Prescriptions     Pending Prescriptions Disp Refills    amphetamine-dextroamphetamine XR (ADDERALL XR) 15 MG 24 hr capsule 30 capsule 0     Sig: Take 1 capsule by mouth Every Morning      Last office visit with prescribing clinician: 10/16/2024   Last telemedicine visit with prescribing clinician: 12/18/2024   Next office visit with prescribing clinician: 3/17/2025                         Would you like a call back once the refill request has been completed: [] Yes [] No    If the office needs to give you a call back, can they leave a voicemail: [] Yes [] No    Niraj Arias MA  03/07/25, 12:26 EST

## 2025-03-07 NOTE — TELEPHONE ENCOUNTER
Pt called requesting refills for Adderall XR and Trintellix ([facility] in [place]). Pt stated that she was on this for a while, then stopped it with Strattera, and had recently restarted it. Pt stated that she used Pt assistance in the past, but Pt now has [employer] insurance and no longer has [employer]. Pt would like Provider to try sending Trintellix to her pharmacy to see if her insurance will cover it. If not Pt would like to consider Pt Assistance again. Pt confirmed that she had enough of both medications through the weekend.   Please advise. Thank you!

## 2025-03-10 ENCOUNTER — TELEPHONE (OUTPATIENT)
Dept: PSYCHIATRY | Facility: CLINIC | Age: 30
End: 2025-03-10
Payer: COMMERCIAL

## 2025-03-10 DIAGNOSIS — F33.1 MODERATE RECURRENT MAJOR DEPRESSION: Primary | ICD-10-CM

## 2025-03-10 NOTE — TELEPHONE ENCOUNTER
Pt called and stated insurance did not cover Trintellix. Prepared patient assistance forms. Pt stated she will come by to sign and give insurance and income information as soon as she can. Will have provider sign when in office. Preparing 1 months worth of Trintellix 10MG samples for pick-up. Pt stated mother would be coming by today to pick those up.

## 2025-03-11 NOTE — TELEPHONE ENCOUNTER
Called to check in with Pt since being out of office to ensure she was able to get her Trintellix. Pt made me aware she spoke with another MA yesterday regarding this matter. Pt inquired if her mother could stop in and collect the paperwork for Pt assistance and then either email it or have her mother stop back by to drop it off, once complete. Pt made aware that I would get clarification on this and contact her back.   Pt's mother showed up to the office to collect the paperwork and I provided her with everything that Pt would need to complete and submit for the process. I asked that Pt's mother have Pt reach out once complete with how she plans to get the paperwork back to us so that we can  submit for assistance. Will update once received.